# Patient Record
Sex: MALE | Race: WHITE | NOT HISPANIC OR LATINO | Employment: FULL TIME | URBAN - METROPOLITAN AREA
[De-identification: names, ages, dates, MRNs, and addresses within clinical notes are randomized per-mention and may not be internally consistent; named-entity substitution may affect disease eponyms.]

---

## 2023-03-25 DIAGNOSIS — F41.9 ANXIETY DISORDER, UNSPECIFIED TYPE: Primary | ICD-10-CM

## 2023-03-27 PROBLEM — Z98.1 S/P LUMBAR SPINAL FUSION: Status: ACTIVE | Noted: 2023-03-27

## 2023-03-27 PROBLEM — I10 HYPERTENSION, ESSENTIAL: Status: ACTIVE | Noted: 2023-03-27

## 2023-03-27 PROBLEM — G47.30 SLEEP APNEA: Status: ACTIVE | Noted: 2023-03-27

## 2023-03-27 PROBLEM — L25.9 CONTACT DERMATITIS: Status: ACTIVE | Noted: 2023-03-27

## 2023-03-27 PROBLEM — M54.12 CERVICAL RADICULITIS: Status: ACTIVE | Noted: 2023-03-27

## 2023-03-27 PROBLEM — F41.9 ANXIETY DISORDER: Status: ACTIVE | Noted: 2023-03-27

## 2023-03-27 PROBLEM — N52.9 ERECTILE DYSFUNCTION: Status: ACTIVE | Noted: 2023-03-27

## 2023-03-27 PROBLEM — M48.02 IDIOPATHIC CERVICAL SPINAL STENOSIS: Status: ACTIVE | Noted: 2023-03-27

## 2023-03-27 PROBLEM — D17.79 EPIDURAL LIPOMATOSIS: Status: ACTIVE | Noted: 2023-03-27

## 2023-03-27 PROBLEM — M48.061 LUMBAR FORAMINAL STENOSIS: Status: ACTIVE | Noted: 2023-03-27

## 2023-03-27 PROBLEM — E88.2 EPIDURAL LIPOMATOSIS: Status: ACTIVE | Noted: 2023-03-27

## 2023-03-27 PROBLEM — M19.049 CMC ARTHRITIS: Status: ACTIVE | Noted: 2023-03-27

## 2023-03-27 RX ORDER — BUPROPION HYDROCHLORIDE 150 MG/1
TABLET, EXTENDED RELEASE ORAL
Qty: 90 TABLET | Refills: 0 | Status: SHIPPED | OUTPATIENT
Start: 2023-03-27 | End: 2023-06-16

## 2023-03-28 DIAGNOSIS — N52.9 ERECTILE DYSFUNCTION, UNSPECIFIED ERECTILE DYSFUNCTION TYPE: Primary | ICD-10-CM

## 2023-03-28 RX ORDER — SILDENAFIL 100 MG/1
TABLET, FILM COATED ORAL
Qty: 30 TABLET | Refills: 0 | Status: SHIPPED | OUTPATIENT
Start: 2023-03-28

## 2023-05-18 ENCOUNTER — OFFICE VISIT (OUTPATIENT)
Dept: PRIMARY CARE | Facility: CLINIC | Age: 58
End: 2023-05-18
Payer: COMMERCIAL

## 2023-05-18 VITALS
RESPIRATION RATE: 16 BRPM | OXYGEN SATURATION: 92 % | TEMPERATURE: 98.7 F | HEART RATE: 77 BPM | WEIGHT: 212.5 LBS | DIASTOLIC BLOOD PRESSURE: 90 MMHG | SYSTOLIC BLOOD PRESSURE: 139 MMHG | BODY MASS INDEX: 29.64 KG/M2

## 2023-05-18 DIAGNOSIS — M48.061 LUMBAR FORAMINAL STENOSIS: ICD-10-CM

## 2023-05-18 DIAGNOSIS — Z98.1 S/P LUMBAR SPINAL FUSION: ICD-10-CM

## 2023-05-18 DIAGNOSIS — M54.50 CHRONIC LEFT-SIDED LOW BACK PAIN WITHOUT SCIATICA: Primary | ICD-10-CM

## 2023-05-18 DIAGNOSIS — G89.29 CHRONIC LEFT-SIDED LOW BACK PAIN WITHOUT SCIATICA: Primary | ICD-10-CM

## 2023-05-18 PROCEDURE — 3080F DIAST BP >= 90 MM HG: CPT

## 2023-05-18 PROCEDURE — 99214 OFFICE O/P EST MOD 30 MIN: CPT

## 2023-05-18 PROCEDURE — 20552 NJX 1/MLT TRIGGER POINT 1/2: CPT

## 2023-05-18 PROCEDURE — 3075F SYST BP GE 130 - 139MM HG: CPT

## 2023-05-18 PROCEDURE — 1036F TOBACCO NON-USER: CPT

## 2023-05-18 RX ORDER — FAMCICLOVIR 250 MG/1
250 TABLET ORAL 2 TIMES DAILY
COMMUNITY
Start: 2016-08-26 | End: 2024-01-30

## 2023-05-18 RX ORDER — MULTIVITAMIN
1 TABLET ORAL DAILY
COMMUNITY

## 2023-05-18 RX ORDER — GUAIFENESIN 1200 MG/1
1200 TABLET, EXTENDED RELEASE ORAL
COMMUNITY
Start: 2016-09-19 | End: 2023-08-30 | Stop reason: ALTCHOICE

## 2023-05-18 RX ORDER — LOSARTAN POTASSIUM 100 MG/1
100 TABLET ORAL DAILY
COMMUNITY
End: 2023-07-18 | Stop reason: SDUPTHER

## 2023-05-18 RX ORDER — NAPROXEN 500 MG/1
500 TABLET ORAL 2 TIMES DAILY PRN
Qty: 60 TABLET | Refills: 0 | Status: SHIPPED | OUTPATIENT
Start: 2023-05-18 | End: 2023-06-15 | Stop reason: SDUPTHER

## 2023-05-18 RX ORDER — MINERAL OIL
1 ENEMA (ML) RECTAL DAILY
COMMUNITY

## 2023-05-18 RX ORDER — PROPRANOLOL/HYDROCHLOROTHIAZID 40 MG-25MG
TABLET ORAL
COMMUNITY

## 2023-05-18 RX ORDER — FLECAINIDE ACETATE 100 MG/1
100 TABLET ORAL 2 TIMES DAILY
COMMUNITY
Start: 2016-08-26

## 2023-05-18 RX ORDER — HYDROCHLOROTHIAZIDE 25 MG/1
25 TABLET ORAL DAILY
COMMUNITY
End: 2024-02-28

## 2023-05-18 RX ORDER — NAPROXEN 500 MG/1
1 TABLET, DELAYED RELEASE ORAL EVERY 8 HOURS PRN
COMMUNITY
Start: 2022-06-22 | End: 2023-05-18 | Stop reason: ALTCHOICE

## 2023-05-18 RX ORDER — OMEPRAZOLE 20 MG/1
20 CAPSULE, DELAYED RELEASE ORAL
COMMUNITY
Start: 2022-08-12 | End: 2023-08-30 | Stop reason: ALTCHOICE

## 2023-05-18 RX ORDER — GABAPENTIN 300 MG/1
300 CAPSULE ORAL 3 TIMES DAILY
COMMUNITY
End: 2023-08-30 | Stop reason: ALTCHOICE

## 2023-05-18 RX ORDER — HYDROCODONE BITARTRATE AND ACETAMINOPHEN 5; 325 MG/1; MG/1
1 TABLET ORAL EVERY 6 HOURS PRN
COMMUNITY
End: 2023-08-30 | Stop reason: ALTCHOICE

## 2023-05-18 RX ORDER — METOPROLOL TARTRATE 25 MG/1
25 TABLET, FILM COATED ORAL 2 TIMES DAILY
COMMUNITY
Start: 2016-08-26 | End: 2024-01-17 | Stop reason: ALTCHOICE

## 2023-05-18 RX ORDER — AMOXICILLIN 250 MG
2 CAPSULE ORAL 2 TIMES DAILY
COMMUNITY
Start: 2022-10-18 | End: 2023-08-30 | Stop reason: ALTCHOICE

## 2023-05-18 RX ORDER — FLAXSEED OIL 1000 MG
1 CAPSULE ORAL 2 TIMES DAILY
COMMUNITY

## 2023-05-18 ASSESSMENT — ENCOUNTER SYMPTOMS
NAUSEA: 0
DIFFICULTY URINATING: 0
FEVER: 0
LIGHT-HEADEDNESS: 0
SHORTNESS OF BREATH: 0
DIZZINESS: 0
MYALGIAS: 1
DYSURIA: 0
OCCASIONAL FEELINGS OF UNSTEADINESS: 0
DEPRESSION: 0
DIARRHEA: 0
CONSTIPATION: 0
LOSS OF SENSATION IN FEET: 0
BACK PAIN: 1
BLOOD IN STOOL: 0
ABDOMINAL PAIN: 0
VOMITING: 0
WEAKNESS: 1

## 2023-05-18 NOTE — PROGRESS NOTES
Subjective   Kvng June is a 57 y.o. male who is here for acute complaint of low back pain.  Patient has history of lumbar stenosis status post spinal fusion in October of last year.  Surgery he was pain-free for several months, however 3 months ago he started having pain in his low back that was a 2 out of 10.  It has now progressed to 8-9 out of 10 is made worse with walking distances more than 100 feet and with standing.  Laying down seems to relieve the pain.  Endorses radiation into the left hip and points to a tender spot on the left lumbar musculature.  Over the past several days he has been having the sensation that his left leg falls asleep and becomes weak after walking and requires rest to feel better.  Denies loss of bowel or bladder control and has not had sciatic type pain either.    He has been taking between 12-15 200 mg ibuprofens daily for several weeks which has provided some pain relief.  Denies abdominal pain, bloody or melanic stools and no history of ulcers.  He has also tried taking cyclobenzaprine and gabapentin which she was previously prescribed from pain management, this does not seem to have helped very much.    Comprehensive Medical/Surgical/Social/Family History  Past Medical History:   Diagnosis Date    Personal history of other diseases of the circulatory system 03/30/2022    History of hypertension     Past Surgical History:   Procedure Laterality Date    OTHER SURGICAL HISTORY  03/30/2022    Appendectomy    US GUIDED NEEDLE LIVER BIOPSY  5/18/2020    US GUIDED NEEDLE LIVER BIOPSY 5/18/2020 [facility]     Social History     Social History Narrative    Not on file         Allergies and Medications  Amlodipine and Venlafaxine  Current Outpatient Medications on File Prior to Visit   Medication Sig Dispense Refill    buPROPion SR (Wellbutrin SR) 150 mg 12 hr tablet TAKE ONE TABLET BY MOUTH DAILY 90 tablet 0    buPROPion XL (Wellbutrin XL) 300 mg 24 hr tablet TAKE ONE  TABLET BY MOUTH DAILY 90 tablet 1    famciclovir (Famvir) 250 mg tablet Take 1 tablet (250 mg) by mouth twice a day.      fexofenadine (Allegra) 180 mg tablet Take 1 tablet (180 mg) by mouth once daily.      flaxseed oiL 1,000 mg capsule Take 1 capsule (1,000 mg) by mouth 2 times a day.      flecainide (Tambocor) 100 mg tablet Take 1 tablet (100 mg) by mouth twice a day.      gabapentin (Neurontin) 300 mg capsule Take 1 capsule (300 mg) by mouth 3 times a day.      guaiFENesin (Mucinex) 1,200 mg tablet extended release 12hr Take 1 tablet (1,200 mg) by mouth.      hydroCHLOROthiazide (HYDRODiuril) 25 mg tablet Take 1 tablet (25 mg) by mouth once daily.      HYDROcodone-acetaminophen (Norco) 5-325 mg tablet Take 1 tablet by mouth every 6 hours if needed for severe pain (7 - 10).      L. acidophilus/Bifid. animalis 15.5 billion cell capsule Take by mouth.      losartan (Cozaar) 100 mg tablet Take 1 tablet (100 mg) by mouth once daily.      metoprolol tartrate (Lopressor) 25 mg tablet Take 1 tablet (25 mg) by mouth 2 times a day.      multivitamin tablet Take 1 tablet by mouth once daily.      omeprazole (PriLOSEC) 20 mg DR capsule Take 1 capsule (20 mg) by mouth once daily in the morning. Take before meals.      sennosides-docusate sodium (Lakeshia-Colace) 8.6-50 mg tablet Take 2 tablets by mouth twice a day.      sildenafil (Viagra) 100 mg tablet TAKE 1 TABLET BY MOUTH DAILY 1 HOUR BEFORE NEEDED 30 tablet 0    turmeric-turmeric root extract 450-50 mg capsule Take by mouth.      [DISCONTINUED] EC-Naprosyn 500 mg EC tablet Take 1 tablet (500 mg) by mouth every 8 hours if needed.       No current facility-administered medications on file prior to visit.       Review of Systems   Constitutional:  Negative for fever.   Respiratory:  Negative for shortness of breath.    Cardiovascular:  Negative for chest pain.   Gastrointestinal:  Negative for abdominal pain, blood in stool, constipation, diarrhea, nausea and vomiting.    Genitourinary:  Negative for decreased urine volume, difficulty urinating, dysuria and urgency.   Musculoskeletal:  Positive for back pain (See HPI) and myalgias.   Neurological:  Positive for weakness (See HPI). Negative for dizziness, syncope and light-headedness.   All other systems reviewed and are negative.      Objective   /90 (BP Location: Right arm, Patient Position: Sitting)   Pulse 77   Temp 37.1 °C (98.7 °F) (Temporal)   Resp 16   Wt 96.4 kg (212 lb 8 oz)   SpO2 92%   BMI 29.64 kg/m²     Physical Exam  Vitals reviewed.   Constitutional:       General: He is not in acute distress.     Appearance: Normal appearance. He is not toxic-appearing.   HENT:      Head: Normocephalic and atraumatic.      Nose: Nose normal.   Eyes:      Extraocular Movements: Extraocular movements intact.   Cardiovascular:      Rate and Rhythm: Normal rate and regular rhythm.      Heart sounds: No murmur heard.     No friction rub. No gallop.   Pulmonary:      Effort: Pulmonary effort is normal. No respiratory distress.      Breath sounds: Normal breath sounds. No wheezing, rhonchi or rales.   Musculoskeletal:      Lumbar back: Tenderness (Bar surgical scar appreciated on examination tenderness and muscle belly of left lumbar musculature) present. No swelling, edema or deformity. Decreased range of motion.        Back:       Comments: Surgical scar appreciated on examination   Skin:     General: Skin is warm and dry.   Neurological:      General: No focal deficit present.      Mental Status: He is alert.   Psychiatric:         Mood and Affect: Mood normal.         Behavior: Behavior normal.     Patient ID: Kvng June is a 57 y.o. male.    Orthopedic Injury Treatment    Date/Time: 5/18/2023 11:31 PM    Performed by: Rico Spencer DO  Authorized by: Rico Spencer DO    Consent:     Consent obtained:  Verbal    Consent given by:  Patient    Risks discussed:  Pain and vascular damage    Alternatives discussed:   Delayed treatment, alternative treatment, observation and referral  Injury:     Injury location:  Spine    Spine injury location:  Lumbar or lower  Pre-procedure details:     Distal neurologic exam:  Normal    Range of motion: reduced    Sedation:     Sedation type:  None  Anesthesia:     Anesthesia method:  None  Post-procedure details:     Distal neurologic exam:  Unchanged    Distal perfusion: unchanged      Procedure completion:  Tolerated well, no immediate complications      Assessment/Plan   Problem List Items Addressed This Visit       S/P lumbar spinal fusion    Relevant Medications    naproxen (Naprosyn) 500 mg tablet    Other Relevant Orders    Referral to Physical Therapy    Referral to Pain Medicine    Inject trigger point, 1 or 2    Lumbar foraminal stenosis    Relevant Medications    naproxen (Naprosyn) 500 mg tablet    Other Relevant Orders    Referral to Physical Therapy    Referral to Pain Medicine    Inject trigger point, 1 or 2    Chronic left-sided low back pain without sciatica - Primary     History of lumbar foraminal stenosis status post spinal fusion surgery in October 2022.  Pain has been somewhat controlled with high doses of ibuprofen.  Prescription for Naprosyn 500 mg twice daily provided, please take this instead of ibuprofen.  Trigger point injection administered in office today.  Referral to pain management and physical therapy provided.    Reviewed alarm symptoms with patient and when to seek immediate medical attention.         Relevant Medications    naproxen (Naprosyn) 500 mg tablet    Other Relevant Orders    Referral to Physical Therapy    Referral to Pain Medicine    Inject trigger point, 1 or 2

## 2023-05-18 NOTE — ASSESSMENT & PLAN NOTE
History of lumbar foraminal stenosis status post spinal fusion surgery in October 2022.  Pain has been somewhat controlled with high doses of ibuprofen.  Prescription for Naprosyn 500 mg twice daily provided, please take this instead of ibuprofen.  Trigger point injection administered in office today.  Referral to pain management and physical therapy provided.    Reviewed alarm symptoms with patient and when to seek immediate medical attention.

## 2023-05-19 NOTE — PROGRESS NOTES
I saw and evaluated the patient. I personally obtained the key and critical portions of the history and physical exam or was physically present for key and critical portions performed by the resident/fellow. I reviewed the resident/fellow's documentation and discussed the patient with the resident/fellow. I agree with the resident/fellow's medical decision making as documented in the note.    Rico Spencer, DO

## 2023-06-13 ENCOUNTER — TELEPHONE (OUTPATIENT)
Dept: PRIMARY CARE | Facility: CLINIC | Age: 58
End: 2023-06-13
Payer: COMMERCIAL

## 2023-06-13 DIAGNOSIS — G89.29 CHRONIC LEFT-SIDED LOW BACK PAIN WITHOUT SCIATICA: ICD-10-CM

## 2023-06-13 DIAGNOSIS — Z98.1 S/P LUMBAR SPINAL FUSION: ICD-10-CM

## 2023-06-13 DIAGNOSIS — M48.061 LUMBAR FORAMINAL STENOSIS: ICD-10-CM

## 2023-06-13 DIAGNOSIS — M54.50 CHRONIC LEFT-SIDED LOW BACK PAIN WITHOUT SCIATICA: ICD-10-CM

## 2023-06-15 DIAGNOSIS — F41.9 ANXIETY DISORDER, UNSPECIFIED TYPE: ICD-10-CM

## 2023-06-15 RX ORDER — NAPROXEN 500 MG/1
500 TABLET ORAL 2 TIMES DAILY PRN
Qty: 60 TABLET | Refills: 0 | Status: SHIPPED | OUTPATIENT
Start: 2023-06-15 | End: 2023-07-15

## 2023-06-15 NOTE — TELEPHONE ENCOUNTER
Called patient to discuss low back pain. He has seen pain management and PT. Pain management thinking that the pain is related to his hip rather than his back and has recommended joint injection.     Refill request for naproxen approved, reviewed precautions and told to avoid other NSAID use while taking naproxen

## 2023-06-15 NOTE — TELEPHONE ENCOUNTER
Rx Refill Request Telephone Encounter    Name:  Kvng Sepulvedapayaldidi  :  [id number]  Medication Name:  Naproxen  Dose : 500 mg  Route : PO  Frequency : BID PRN  Quantity : 1 tablet  Specific Pharmacy location:  [id number] [address] [facility]   Date of last appointment:  2023

## 2023-06-16 RX ORDER — BUPROPION HYDROCHLORIDE 150 MG/1
TABLET, EXTENDED RELEASE ORAL
Qty: 90 TABLET | Refills: 0 | Status: SHIPPED | OUTPATIENT
Start: 2023-06-16 | End: 2023-08-30 | Stop reason: ALTCHOICE

## 2023-06-21 ENCOUNTER — TELEPHONE (OUTPATIENT)
Dept: PRIMARY CARE | Facility: CLINIC | Age: 58
End: 2023-06-21
Payer: COMMERCIAL

## 2023-07-18 DIAGNOSIS — I10 HYPERTENSION, ESSENTIAL: Primary | ICD-10-CM

## 2023-07-18 RX ORDER — LOSARTAN POTASSIUM 100 MG/1
100 TABLET ORAL DAILY
Qty: 90 TABLET | Refills: 3 | Status: SHIPPED | OUTPATIENT
Start: 2023-07-18 | End: 2024-07-17

## 2023-08-30 ENCOUNTER — OFFICE VISIT (OUTPATIENT)
Dept: PRIMARY CARE | Facility: CLINIC | Age: 58
End: 2023-08-30
Payer: COMMERCIAL

## 2023-08-30 VITALS
BODY MASS INDEX: 29.49 KG/M2 | WEIGHT: 206 LBS | OXYGEN SATURATION: 98 % | TEMPERATURE: 98.1 F | RESPIRATION RATE: 18 BRPM | HEART RATE: 69 BPM | HEIGHT: 70 IN | DIASTOLIC BLOOD PRESSURE: 88 MMHG | SYSTOLIC BLOOD PRESSURE: 140 MMHG

## 2023-08-30 DIAGNOSIS — F41.1 GENERALIZED ANXIETY DISORDER: ICD-10-CM

## 2023-08-30 DIAGNOSIS — I10 HYPERTENSION, ESSENTIAL: Primary | ICD-10-CM

## 2023-08-30 DIAGNOSIS — Z98.890 STATUS POST ABLATION OF ATRIAL FIBRILLATION: ICD-10-CM

## 2023-08-30 DIAGNOSIS — Z86.79 STATUS POST ABLATION OF ATRIAL FIBRILLATION: ICD-10-CM

## 2023-08-30 PROBLEM — I48.91 ATRIAL FIBRILLATION (MULTI): Status: ACTIVE | Noted: 2023-08-30

## 2023-08-30 PROCEDURE — 3077F SYST BP >= 140 MM HG: CPT

## 2023-08-30 PROCEDURE — 99213 OFFICE O/P EST LOW 20 MIN: CPT

## 2023-08-30 PROCEDURE — 1036F TOBACCO NON-USER: CPT

## 2023-08-30 PROCEDURE — 3079F DIAST BP 80-89 MM HG: CPT

## 2023-08-30 RX ORDER — BUPROPION HYDROCHLORIDE 150 MG/1
150 TABLET ORAL EVERY MORNING
Qty: 30 TABLET | Refills: 3 | Status: SHIPPED | OUTPATIENT
Start: 2023-08-30 | End: 2024-01-02 | Stop reason: SDUPTHER

## 2023-08-30 RX ORDER — FEXOFENADINE HCL AND PSEUDOEPHEDRINE HCI 180; 240 MG/1; MG/1
TABLET, EXTENDED RELEASE ORAL
COMMUNITY
End: 2024-01-17 | Stop reason: ALTCHOICE

## 2023-08-30 ASSESSMENT — ENCOUNTER SYMPTOMS
LIGHT-HEADEDNESS: 0
NERVOUS/ANXIOUS: 0
HYPERACTIVE: 0
BACK PAIN: 1
SHORTNESS OF BREATH: 0
HALLUCINATIONS: 0
DIZZINESS: 0
CHILLS: 0
VOMITING: 0
NAUSEA: 0
DYSPHORIC MOOD: 0
FEVER: 0

## 2023-08-30 NOTE — PROGRESS NOTES
"Subjective   Kvng June is a 58 y.o. male who is here to discuss refill on wellbutrin, which he has been taking for years.  He has been on a dose of 450 mg Wellbutrin daily and is currently out of his 150 mg tablets.  He does feel like Wellbutrin greatly helps improve his mood especially when he is dealing with his chronic back pain.  Of note patient does admit to sometimes having intrusive thoughts of \"thinking that he would be better off dead\".  These thoughts occur more frequently when his pain is uncontrolled or he is out of his Wellbutrin.  He insists that he has never had desire, intent, or plan to harm himself or others.     He also has swelling at the tip of his right index finger. He noticed this last week. Finger is slightly tender when he presses on it, but it is not painful. Full range of motion. No other joint swelling elsewhere.     Comprehensive Medical/Surgical/Social/Family History  Past Medical History:   Diagnosis Date    Personal history of other diseases of the circulatory system 03/30/2022    History of hypertension     Past Surgical History:   Procedure Laterality Date    OTHER SURGICAL HISTORY  03/30/2022    Appendectomy    US GUIDED NEEDLE LIVER BIOPSY  5/18/2020    US GUIDED NEEDLE LIVER BIOPSY 5/18/2020 [facility]     Social History     Social History Narrative    Not on file         Allergies and Medications  Amlodipine and Venlafaxine  Current Outpatient Medications on File Prior to Visit   Medication Sig Dispense Refill    famciclovir (Famvir) 250 mg tablet Take 1 tablet (250 mg) by mouth twice a day.      fexofenadine (Allegra) 180 mg tablet Take 1 tablet (180 mg) by mouth once daily.      fexofenadine-pseudoephedrine (Allegra-D 24 Hour) 180-240 mg 24 hr tablet Take by mouth.      flaxseed oiL 1,000 mg capsule Take 1 capsule (1,000 mg) by mouth 2 times a day.      flecainide (Tambocor) 100 mg tablet Take 1 tablet (100 mg) by mouth twice a day.      hydroCHLOROthiazide " (HYDRODiuril) 25 mg tablet Take 1 tablet (25 mg) by mouth once daily.      losartan (Cozaar) 100 mg tablet Take 1 tablet (100 mg) by mouth once daily. 90 tablet 3    metoprolol tartrate (Lopressor) 25 mg tablet Take 1 tablet (25 mg) by mouth 2 times a day.      multivitamin tablet Take 1 tablet by mouth once daily.      sildenafil (Viagra) 100 mg tablet TAKE 1 TABLET BY MOUTH DAILY 1 HOUR BEFORE NEEDED 30 tablet 0    turmeric-turmeric root extract 450-50 mg capsule Take by mouth.      [DISCONTINUED] buPROPion SR (Wellbutrin SR) 150 mg 12 hr tablet TAKE ONE TABLET BY MOUTH DAILY 90 tablet 0    buPROPion XL (Wellbutrin XL) 300 mg 24 hr tablet TAKE ONE TABLET BY MOUTH DAILY 90 tablet 1    [DISCONTINUED] gabapentin (Neurontin) 300 mg capsule Take 1 capsule (300 mg) by mouth 3 times a day.      [DISCONTINUED] guaiFENesin (Mucinex) 1,200 mg tablet extended release 12hr Take 1 tablet (1,200 mg) by mouth.      [DISCONTINUED] HYDROcodone-acetaminophen (Norco) 5-325 mg tablet Take 1 tablet by mouth every 6 hours if needed for severe pain (7 - 10).      [DISCONTINUED] L. acidophilus/Bifid. animalis 15.5 billion cell capsule Take by mouth.      [DISCONTINUED] omeprazole (PriLOSEC) 20 mg DR capsule Take 1 capsule (20 mg) by mouth once daily in the morning. Take before meals.      [DISCONTINUED] sennosides-docusate sodium (Lakeshia-Colace) 8.6-50 mg tablet Take 2 tablets by mouth twice a day.       Current Facility-Administered Medications on File Prior to Visit   Medication Dose Route Frequency Provider Last Rate Last Admin    triamcinolone acetonide (Kenalog-80) injection 80 mg  80 mg intramuscular Once Stanley A Thurston, DO             Review of Systems   Constitutional:  Negative for chills and fever.   Respiratory:  Negative for shortness of breath.    Cardiovascular:  Negative for chest pain.   Gastrointestinal:  Negative for nausea and vomiting.   Musculoskeletal:  Positive for back pain (history of chronic back pain).  "  Neurological:  Negative for dizziness and light-headedness.   Psychiatric/Behavioral:  Positive for suicidal ideas (see HPI). Negative for dysphoric mood and hallucinations. The patient is not nervous/anxious and is not hyperactive.    All other systems reviewed and are negative.      Objective   /88 (BP Location: Right arm, Patient Position: Sitting)   Pulse 69   Temp 36.7 °C (98.1 °F)   Resp 18   Ht 1.778 m (5' 10\")   Wt 93.4 kg (206 lb)   SpO2 98%   BMI 29.56 kg/m²     Physical Exam  Vitals reviewed.   Constitutional:       General: He is not in acute distress.     Appearance: Normal appearance. He is not toxic-appearing.   HENT:      Head: Normocephalic and atraumatic.      Nose: Nose normal.   Eyes:      Extraocular Movements: Extraocular movements intact.   Cardiovascular:      Rate and Rhythm: Normal rate and regular rhythm.      Heart sounds: No murmur heard.     No friction rub. No gallop.   Pulmonary:      Effort: Pulmonary effort is normal. No respiratory distress.      Breath sounds: Normal breath sounds. No wheezing, rhonchi or rales.   Musculoskeletal:      Right hand: Swelling present.      Comments: Swelling of right DIP 2nd finger. No erythema, tenderness, warmth, fluctuance.    Skin:     General: Skin is warm and dry.   Neurological:      General: No focal deficit present.      Mental Status: He is alert.   Psychiatric:         Mood and Affect: Mood normal.         Behavior: Behavior normal.         Assessment/Plan 58 year old male presenting for refills on his wellbutrin 150 mg. He has been taking 450 mg total of wellbutrin (combo of 150 mg and 300 mg) for years now. He reports doing well and it improves his mood greatly. Recently without the 150 mg doses he has had thoughts of wishing that he was dead.  He admits that this is something that only happens when he is out of his medication or his back pain is severely uncontrolled.  He adamantly denies any intent, plan, or desire to " actually harm himself or others.  Discussed with patient that if this is something that continues to happen he needs to immediately come back into the office or go to the emergency department.    Additionally patient has extensive cardiac history including A-fib with ablation, hypertension, etc.  He would like to see a cardiologist within the  system so referral was provided.    In regards to the swelling that he has noticed on the DIP of his 2nd right finger, it appears to be benign swelling related to arthritis.  However if he starts having joint swelling in other parts of his body or this becomes hot, red, painful, febrile he will return to the office.  Problem List Items Addressed This Visit       Anxiety disorder    Relevant Medications    buPROPion XL (Wellbutrin XL) 150 mg 24 hr tablet    Hypertension, essential - Primary    Relevant Orders    Referral to Cardiology    Status post ablation of atrial fibrillation    Relevant Orders    Referral to Cardiology

## 2023-08-31 NOTE — PROGRESS NOTES
I reviewed with the resident the medical history and the resident’s findings on physical examination.  I discussed with the resident the patient’s diagnosis and concur with the treatment plan as documented in the resident note.     Rico Spencer, DO

## 2023-10-12 ENCOUNTER — OFFICE VISIT (OUTPATIENT)
Dept: ORTHOPEDIC SURGERY | Facility: CLINIC | Age: 58
End: 2023-10-12
Payer: COMMERCIAL

## 2023-10-12 ENCOUNTER — ANCILLARY PROCEDURE (OUTPATIENT)
Dept: RADIOLOGY | Facility: CLINIC | Age: 58
End: 2023-10-12
Payer: COMMERCIAL

## 2023-10-12 DIAGNOSIS — M70.61 GREATER TROCHANTERIC BURSITIS OF RIGHT HIP: ICD-10-CM

## 2023-10-12 DIAGNOSIS — M25.551 PAIN OF RIGHT HIP: ICD-10-CM

## 2023-10-12 DIAGNOSIS — S70.00XA CONTUSION OF HIP AND THIGH, INITIAL ENCOUNTER: ICD-10-CM

## 2023-10-12 DIAGNOSIS — S76.019A HIP STRAIN, INITIAL ENCOUNTER: ICD-10-CM

## 2023-10-12 DIAGNOSIS — S70.10XA CONTUSION OF HIP AND THIGH, INITIAL ENCOUNTER: ICD-10-CM

## 2023-10-12 PROCEDURE — 73502 X-RAY EXAM HIP UNI 2-3 VIEWS: CPT | Mod: RT,FY

## 2023-10-12 PROCEDURE — 1036F TOBACCO NON-USER: CPT | Performed by: FAMILY MEDICINE

## 2023-10-12 PROCEDURE — 99203 OFFICE O/P NEW LOW 30 MIN: CPT | Performed by: FAMILY MEDICINE

## 2023-10-12 PROCEDURE — 73502 X-RAY EXAM HIP UNI 2-3 VIEWS: CPT | Mod: RIGHT SIDE | Performed by: FAMILY MEDICINE

## 2023-10-12 PROCEDURE — 99213 OFFICE O/P EST LOW 20 MIN: CPT | Performed by: FAMILY MEDICINE

## 2023-10-12 NOTE — PROGRESS NOTES
Acute Injury New Patient Visit    CC:   Chief Complaint   Patient presents with    Right Hip - Post-op, Pain     Ongoing worsening issue  X rays  today       HPI: Kvng is a 58 y.o.male who presents today with new complaints of acute right hip pain from twisting injury.  He denies any numbness tingling or burning.  States resultant now worsening left-sided lateral hip pain as he has been favoring his right hip.  He is status post lumbar spine fusion for prior chronic low back pain.  He denies any deep inguinal type pain everything is laterally.  He stated that he came here today because of the worsening bruising to the lateral side of the hip.  He has some mild difficulty with activities but is able to largely weight-bear and ambulate without much of a problem.        Review of Systems   GENERAL: Negative for malaise, significant weight loss, fever  MUSCULOSKELETAL: See HPI  NEURO: Negative for numbness / tingling     Past Medical History  Past Medical History:   Diagnosis Date    Personal history of other diseases of the circulatory system 2022    History of hypertension       Medication review  Medication Documentation Review Audit       Reviewed by Rico Spencer DO (Physician) on 23 at 1440      Medication Order Taking? Sig Documenting Provider Last Dose Status   Discontinued 23 1425   buPROPion XL (Wellbutrin XL) 300 mg 24 hr tablet [id number]  TAKE ONE TABLET BY MOUTH DAILY Da Dominguez MD   23 2359   famciclovir (Famvir) 250 mg tablet [id number] Yes Take 1 tablet (250 mg) by mouth twice a day. Historical Provider, MD Taking Active   fexofenadine (Allegra) 180 mg tablet [id number] Yes Take 1 tablet (180 mg) by mouth once daily. Historical Provider, MD Taking Active   fexofenadine-pseudoephedrine (Allegra-D 24 Hour) 180-240 mg 24 hr tablet [id number] Yes Take by mouth. Historical Provider, MD Taking Active   flaxseed oiL 1,000 mg capsule [id number] Yes Take 1 capsule (1,000 mg) by  "mouth 2 times a day. Historical Provider, MD Taking Active   flecainide (Tambocor) 100 mg tablet [id number] Yes Take 1 tablet (100 mg) by mouth twice a day. Historical Provider, MD Taking Active     Discontinued 08/30/23 1356     Discontinued 08/30/23 1356   hydroCHLOROthiazide (HYDRODiuril) 25 mg tablet [id number] Yes Take 1 tablet (25 mg) by mouth once daily. Historical Provider, MD Taking Active     Discontinued 08/30/23 1356     Discontinued 08/30/23 1356   losartan (Cozaar) 100 mg tablet [id number] Yes Take 1 tablet (100 mg) by mouth once daily. Rico Spencer DO Taking Active   metoprolol tartrate (Lopressor) 25 mg tablet [id number] Yes Take 1 tablet (25 mg) by mouth 2 times a day. Historical Provider, MD Taking Active   multivitamin tablet [id number] Yes Take 1 tablet by mouth once daily. Historical Provider, MD Taking Active     Discontinued 08/30/23 1356     Discontinued 08/30/23 1356   sildenafil (Viagra) 100 mg tablet [id number] Yes TAKE 1 TABLET BY MOUTH DAILY 1 HOUR BEFORE NEEDED Rico Spencer DO Taking Active   triamcinolone acetonide (Kenalog-80) injection 80 mg [id number]   Stanley A Thurston, DO  Active   turmeric-turmeric root extract 450-50 mg capsule [id number] Yes Take by mouth. Historical Provider, MD Taking Active                    Allergies  Allergies   Allergen Reactions    Amlodipine Other     stomach cramps    Venlafaxine Other     Induces Irregular heart beat. \"Makes me mean\"    Induces Irregular heart beat       Social History  Social History     Socioeconomic History    Marital status:      Spouse name: Not on file    Number of children: Not on file    Years of education: Not on file    Highest education level: Not on file   Occupational History    Not on file   Tobacco Use    Smoking status: Former     Types: Cigarettes    Smokeless tobacco: Never   Substance and Sexual Activity    Alcohol use: Yes    Drug use: Never    Sexual activity: Not on file   Other Topics Concern    Not on file   Social " History Narrative    Not on file     Social Determinants of Health     Financial Resource Strain: Not on file   Food Insecurity: Not on file   Transportation Needs: Not on file   Physical Activity: Not on file   Stress: Not on file   Social Connections: Not on file   Intimate Partner Violence: Not on file   Housing Stability: Not on file       Surgical History  Past Surgical History:   Procedure Laterality Date    OTHER SURGICAL HISTORY  03/30/2022    Appendectomy    US GUIDED NEEDLE LIVER BIOPSY  5/18/2020    US GUIDED NEEDLE LIVER BIOPSY 5/18/2020 [facility]       Physical Exam:  GENERAL:  Patient is awake, alert, and oriented to person place and time.  Patient appears well nourished and well kept.  Affect Calm, Not Acutely Distressed.  HEENT:  Normocephalic, Atraumatic, EOMI  CARDIOVASCULAR:  Hemodynamically stable.  RESPIRATORY:  Normal respirations with unlabored breathing.  NEURO: Gross sensation intact to the lower extremities bilaterally.  Extremity: Right hip exam: On inspection obvious soft tissue swelling and bruising in intermittent stages of healing all throughout the lateral side of the hip.  Mild tenderness palpation at the greater trochanter region and bursa.  He has a mild tenderness palpation at the iliac crest.  No ASIS or AIIS pain.  He has a negative logroll with very minimal symptoms he has an equivocal SILVINO and FADIR test all with lateral sided pain full intact extensor mechanism 5 out of 5 strength with hip flexion abduction and adduction is otherwise normal and largely unremarkable.  Contralateral limb was examined and noted to be full range of motion and strength.      Diagnostics: See dictated report from today        Procedure: None  Procedures    Assessment:   Problem List Items Addressed This Visit    None  Visit Diagnoses       Pain of right hip        Relevant Orders    XR hip right 2 or 3 views             Plan: Discussed with the patient concern for may be a small avulsion  injury with a soft tissue or bony.  We will not call any fracture here today.  We will offer him a short course of an oral steroid in addition to continuing with any anti-inflammatory down the road.  We will hold off on any muscle relaxer.  We will offer him a short course of physical therapy.  We will see him back in 4 to 6 weeks for repeat evaluation with any worsening or persistent pain we will consider MRI at that time.  We will hold off on any injection treatment.  Orders Placed This Encounter    XR hip right 2 or 3 views      At the conclusion of the visit there were no further questions by the patient/family regarding their plan of care.  Patient was instructed to call or return with any issues, questions, or concerns regarding their injury and/or treatment plan described above.     10/12/23 at 2:00 PM - Cole C Budinsky, MD    Office: [phone]    This note was prepared using voice recognition software.  The details of this note are correct and have been reviewed, and corrected to the best of my ability.  Some grammatical errors may persist related to the Dragon software.

## 2023-10-13 RX ORDER — METHYLPREDNISOLONE 4 MG/1
TABLET ORAL
Qty: 1 EACH | Refills: 0 | Status: SHIPPED | OUTPATIENT
Start: 2023-10-13 | End: 2024-01-17 | Stop reason: WASHOUT

## 2023-10-16 ENCOUNTER — HOSPITAL ENCOUNTER (OUTPATIENT)
Dept: RADIOLOGY | Facility: HOSPITAL | Age: 58
Discharge: HOME | End: 2023-10-16
Payer: COMMERCIAL

## 2023-10-16 DIAGNOSIS — I10 ESSENTIAL (PRIMARY) HYPERTENSION: ICD-10-CM

## 2023-10-16 PROCEDURE — 75571 CT HRT W/O DYE W/CA TEST: CPT

## 2023-10-24 PROBLEM — M67.40 GANGLION: Status: ACTIVE | Noted: 2023-10-24

## 2023-10-24 PROBLEM — M18.12 ARTHRITIS OF CARPOMETACARPAL (CMC) JOINT OF LEFT THUMB: Status: ACTIVE | Noted: 2023-10-24

## 2023-10-24 PROBLEM — M43.16 SPONDYLOLISTHESIS OF LUMBAR REGION: Status: ACTIVE | Noted: 2023-10-24

## 2023-10-24 PROBLEM — M18.12 ARTHRITIS OF CARPOMETACARPAL (CMC) JOINT OF LEFT THUMB: Status: RESOLVED | Noted: 2023-10-24 | Resolved: 2023-10-24

## 2023-10-24 PROBLEM — R04.0 EPISTAXIS: Status: ACTIVE | Noted: 2023-10-24

## 2023-10-24 PROBLEM — B00.9 HERPES SIMPLEX: Status: ACTIVE | Noted: 2023-10-24

## 2023-10-24 PROBLEM — L03.012 PARONYCHIA OF FINGER OF LEFT HAND: Status: ACTIVE | Noted: 2023-10-24

## 2023-10-24 PROBLEM — M54.16 LUMBAR RADICULOPATHY: Status: ACTIVE | Noted: 2023-10-24

## 2023-10-24 PROBLEM — M53.3 SI (SACROILIAC) JOINT DYSFUNCTION: Status: ACTIVE | Noted: 2023-10-24

## 2023-10-24 PROBLEM — R73.9 HYPERGLYCEMIA: Status: ACTIVE | Noted: 2023-10-24

## 2023-10-24 PROBLEM — E66.3 OVERWEIGHT: Status: ACTIVE | Noted: 2023-10-24

## 2023-10-24 PROBLEM — R26.2 DIFFICULTY WALKING: Status: ACTIVE | Noted: 2023-10-24

## 2023-10-24 PROBLEM — M48.062 SPINAL STENOSIS OF LUMBAR REGION WITH NEUROGENIC CLAUDICATION: Status: ACTIVE | Noted: 2023-10-24

## 2023-10-24 PROBLEM — R00.2 PALPITATIONS: Status: ACTIVE | Noted: 2023-10-24

## 2023-10-24 PROBLEM — L74.513 HYPERHIDROSIS OF FEET: Status: ACTIVE | Noted: 2023-10-24

## 2023-10-24 PROBLEM — R19.7 BLOODY DIARRHEA: Status: ACTIVE | Noted: 2023-10-24

## 2023-10-25 ENCOUNTER — APPOINTMENT (OUTPATIENT)
Dept: CARDIOLOGY | Facility: CLINIC | Age: 58
End: 2023-10-25
Payer: COMMERCIAL

## 2023-11-01 DIAGNOSIS — F41.9 ANXIETY DISORDER, UNSPECIFIED TYPE: ICD-10-CM

## 2023-11-01 RX ORDER — BUPROPION HYDROCHLORIDE 300 MG/1
300 TABLET ORAL DAILY
Qty: 90 TABLET | Refills: 0 | Status: SHIPPED | OUTPATIENT
Start: 2023-11-01 | End: 2024-02-21

## 2023-11-17 ENCOUNTER — APPOINTMENT (OUTPATIENT)
Dept: ORTHOPEDIC SURGERY | Facility: CLINIC | Age: 58
End: 2023-11-17
Payer: COMMERCIAL

## 2023-11-17 ENCOUNTER — OFFICE VISIT (OUTPATIENT)
Dept: PRIMARY CARE | Facility: CLINIC | Age: 58
End: 2023-11-17
Payer: COMMERCIAL

## 2023-11-17 ENCOUNTER — LAB (OUTPATIENT)
Dept: LAB | Facility: LAB | Age: 58
End: 2023-11-17
Payer: COMMERCIAL

## 2023-11-17 VITALS
HEIGHT: 70 IN | RESPIRATION RATE: 16 BRPM | HEART RATE: 74 BPM | DIASTOLIC BLOOD PRESSURE: 90 MMHG | WEIGHT: 214 LBS | SYSTOLIC BLOOD PRESSURE: 150 MMHG | TEMPERATURE: 97.7 F | BODY MASS INDEX: 30.64 KG/M2

## 2023-11-17 DIAGNOSIS — R10.9 FLANK PAIN: ICD-10-CM

## 2023-11-17 DIAGNOSIS — S39.012A STRAIN OF LUMBAR REGION, INITIAL ENCOUNTER: Primary | ICD-10-CM

## 2023-11-17 LAB
ALBUMIN SERPL BCP-MCNC: 4.5 G/DL (ref 3.4–5)
ANION GAP SERPL CALC-SCNC: 12 MMOL/L (ref 10–20)
APPEARANCE UR: CLEAR
BILIRUB UR STRIP.AUTO-MCNC: NEGATIVE MG/DL
BUN SERPL-MCNC: 20 MG/DL (ref 6–23)
CALCIUM SERPL-MCNC: 9.5 MG/DL (ref 8.6–10.3)
CHLORIDE SERPL-SCNC: 100 MMOL/L (ref 98–107)
CO2 SERPL-SCNC: 31 MMOL/L (ref 21–32)
COLOR UR: YELLOW
CREAT SERPL-MCNC: 1.11 MG/DL (ref 0.5–1.3)
ERYTHROCYTE [DISTWIDTH] IN BLOOD BY AUTOMATED COUNT: 12.8 % (ref 11.5–14.5)
GFR SERPL CREATININE-BSD FRML MDRD: 76 ML/MIN/1.73M*2
GLUCOSE SERPL-MCNC: 106 MG/DL (ref 74–99)
GLUCOSE UR STRIP.AUTO-MCNC: NEGATIVE MG/DL
HCT VFR BLD AUTO: 43.8 % (ref 41–52)
HGB BLD-MCNC: 14.2 G/DL (ref 13.5–17.5)
KETONES UR STRIP.AUTO-MCNC: NEGATIVE MG/DL
LEUKOCYTE ESTERASE UR QL STRIP.AUTO: NEGATIVE
MCH RBC QN AUTO: 35.6 PG (ref 26–34)
MCHC RBC AUTO-ENTMCNC: 32.4 G/DL (ref 32–36)
MCV RBC AUTO: 110 FL (ref 80–100)
NITRITE UR QL STRIP.AUTO: NEGATIVE
NRBC BLD-RTO: 0 /100 WBCS (ref 0–0)
PH UR STRIP.AUTO: 7 [PH]
PHOSPHATE SERPL-MCNC: 3.2 MG/DL (ref 2.5–4.9)
PLATELET # BLD AUTO: 159 X10*3/UL (ref 150–450)
POTASSIUM SERPL-SCNC: 4.5 MMOL/L (ref 3.5–5.3)
PROT UR STRIP.AUTO-MCNC: NEGATIVE MG/DL
RBC # BLD AUTO: 3.99 X10*6/UL (ref 4.5–5.9)
RBC # UR STRIP.AUTO: NEGATIVE /UL
SODIUM SERPL-SCNC: 138 MMOL/L (ref 136–145)
SP GR UR STRIP.AUTO: 1.02
UROBILINOGEN UR STRIP.AUTO-MCNC: <2 MG/DL
WBC # BLD AUTO: 5.4 X10*3/UL (ref 4.4–11.3)

## 2023-11-17 PROCEDURE — 81003 URINALYSIS AUTO W/O SCOPE: CPT

## 2023-11-17 PROCEDURE — 36415 COLL VENOUS BLD VENIPUNCTURE: CPT

## 2023-11-17 PROCEDURE — 1036F TOBACCO NON-USER: CPT

## 2023-11-17 PROCEDURE — 80069 RENAL FUNCTION PANEL: CPT

## 2023-11-17 PROCEDURE — 3080F DIAST BP >= 90 MM HG: CPT

## 2023-11-17 PROCEDURE — 85027 COMPLETE CBC AUTOMATED: CPT

## 2023-11-17 PROCEDURE — 99214 OFFICE O/P EST MOD 30 MIN: CPT

## 2023-11-17 PROCEDURE — 3077F SYST BP >= 140 MM HG: CPT

## 2023-11-17 RX ORDER — MELOXICAM 15 MG/1
15 TABLET ORAL DAILY
Qty: 15 TABLET | Refills: 0 | Status: SHIPPED | OUTPATIENT
Start: 2023-11-17 | End: 2023-12-02

## 2023-11-17 RX ORDER — LIDOCAINE 50 MG/G
1 PATCH TOPICAL DAILY
Qty: 15 PATCH | Refills: 0 | Status: CANCELLED | OUTPATIENT
Start: 2023-11-17 | End: 2024-11-16

## 2023-11-17 RX ORDER — DICLOFENAC SODIUM 10 MG/G
4 GEL TOPICAL 4 TIMES DAILY PRN
Qty: 100 G | Refills: 0 | Status: SHIPPED | OUTPATIENT
Start: 2023-11-17 | End: 2024-01-17 | Stop reason: WASHOUT

## 2023-11-17 RX ORDER — LIDOCAINE 50 MG/G
1 PATCH TOPICAL DAILY
Qty: 30 PATCH | Refills: 0 | Status: SHIPPED | OUTPATIENT
Start: 2023-11-17 | End: 2023-12-17

## 2023-11-17 ASSESSMENT — ENCOUNTER SYMPTOMS
ARTHRALGIAS: 1
HEMATURIA: 0
FLANK PAIN: 1
SHORTNESS OF BREATH: 0
VOMITING: 0
LIGHT-HEADEDNESS: 0
COLOR CHANGE: 0
COUGH: 0
MYALGIAS: 1
FEVER: 0
BACK PAIN: 1
DIFFICULTY URINATING: 0
NAUSEA: 0
CHEST TIGHTNESS: 0
DIZZINESS: 0
DYSURIA: 0

## 2023-11-17 NOTE — PROGRESS NOTES
I reviewed the resident/fellow's documentation and discussed the patient with the resident/fellow. I agree with the resident/fellow's medical decision making as documented in the note.     Manny Mcdowell MD

## 2023-11-17 NOTE — PROGRESS NOTES
"Subjective   Patient ID: Kvng June is a 58 y.o. male who presents for Hip Pain (Left, moving up back).   Past medical history includes lumbar stenosis with history of lumbar fusion and chronic bilateral hip pain.  He describes this pain as below his rib cage on the back left side of his torso.  This area is tender to touch and he describes the pain as sharp and intermittent.  It is worse with standing/walking and has even woken him up from night because of the pain.  He has been taking 220 mg naproxen approximately 3-4 times a day which has been helping him with the pain well.  Additionally reports having taken 1 leftover Norco from a hand surgery which did help.    She denies any radiation of pain, numbness, tingling, weakness, bowel or bladder incontinence, changes since urine, blood in the urine, fevers, chills, shortness of breath, pleuritic pain.    Review of Systems   Constitutional:  Negative for fever.   Respiratory:  Negative for cough, chest tightness and shortness of breath.    Cardiovascular:  Negative for chest pain.   Gastrointestinal:  Negative for nausea and vomiting.   Genitourinary:  Positive for flank pain. Negative for decreased urine volume, difficulty urinating, dysuria, hematuria and urgency.   Musculoskeletal:  Positive for arthralgias, back pain and myalgias. Negative for gait problem.   Skin:  Negative for color change.   Neurological:  Negative for dizziness and light-headedness.   All other systems reviewed and are negative.      Objective   /90   Pulse 74   Temp 36.5 °C (97.7 °F)   Resp 16   Ht 1.778 m (5' 10\")   Wt 97.1 kg (214 lb)   BMI 30.71 kg/m²     Physical Exam  Vitals reviewed.   Constitutional:       General: He is not in acute distress.     Appearance: Normal appearance. He is not toxic-appearing.   HENT:      Head: Normocephalic and atraumatic.      Nose: Nose normal.   Eyes:      Extraocular Movements: Extraocular movements intact.   Cardiovascular:      " Rate and Rhythm: Normal rate and regular rhythm.      Heart sounds: No murmur heard.     No friction rub. No gallop.   Pulmonary:      Effort: Pulmonary effort is normal. No respiratory distress.      Breath sounds: Normal breath sounds. No wheezing, rhonchi or rales.   Musculoskeletal:         General: Tenderness (Tenderness to palpation in left lumbar paraspinals below ribs.  No radicular symptoms producible, no weakness or sensation discrepancy) present.   Skin:     General: Skin is warm and dry.      Findings: No lesion or rash.   Neurological:      General: No focal deficit present.      Mental Status: He is alert and oriented to person, place, and time.   Psychiatric:         Mood and Affect: Mood normal.         Behavior: Behavior normal.         Assessment/Plan 58-year-old male presenting with left paraspinal muscular pain.  History and examination consistent with muscle strain of the paraspinal musculature on the left side.  He has no midline tenderness, radicular signs or symptoms, numbness, tingling, weakness, loss of bowel or bladder continence.  Additionally he has no shortness of breath, pleuritic pain, focal lung findings that would be suggestive of infectious lung etiology.  Given the nonspecific nature of his pain we will check RFP and urinalysis for blood in the event that his pain is being caused by a kidney stone, however he has never had kidney stone in the past.    We will treat this as musculoskeletal in nature and provided prescription for meloxicam 50 mg, diclofenac gel, and lidocaine patches.    Reviewed ED precautions with patient he acknowledged he will follow-up as needed if his pain gets worse or does not improve.  Problem List Items Addressed This Visit    None  Visit Diagnoses         Codes    Strain of lumbar region, initial encounter    -  Primary S39.012A    Relevant Medications    meloxicam (Mobic) 15 mg tablet    diclofenac sodium (Voltaren) 1 % gel gel    lidocaine (Lidoderm) 5  % patch    Flank pain     R10.9    Relevant Orders    Urinalysis with Reflex Microscopic    Renal function panel    CBC

## 2023-11-22 NOTE — RESULT ENCOUNTER NOTE
Please call Kvng June to inform him that his renal function and blood counts are acceptable and largely unchanged from previous lab work.  Additionally his urinalysis is completely normal.  All in all there is no signs of renal stones on these tests.

## 2023-12-30 DIAGNOSIS — F41.1 GENERALIZED ANXIETY DISORDER: ICD-10-CM

## 2024-01-02 RX ORDER — BUPROPION HYDROCHLORIDE 150 MG/1
150 TABLET ORAL
Qty: 30 TABLET | Refills: 0 | Status: SHIPPED | OUTPATIENT
Start: 2024-01-02 | End: 2024-01-23

## 2024-01-17 ENCOUNTER — OFFICE VISIT (OUTPATIENT)
Dept: PRIMARY CARE | Facility: CLINIC | Age: 59
End: 2024-01-17
Payer: COMMERCIAL

## 2024-01-17 VITALS
SYSTOLIC BLOOD PRESSURE: 135 MMHG | HEART RATE: 79 BPM | OXYGEN SATURATION: 95 % | HEIGHT: 70 IN | BODY MASS INDEX: 31.21 KG/M2 | WEIGHT: 218 LBS | DIASTOLIC BLOOD PRESSURE: 81 MMHG | RESPIRATION RATE: 18 BRPM | TEMPERATURE: 98.5 F

## 2024-01-17 DIAGNOSIS — K57.92 DIVERTICULITIS: Primary | ICD-10-CM

## 2024-01-17 PROCEDURE — 99213 OFFICE O/P EST LOW 20 MIN: CPT

## 2024-01-17 PROCEDURE — 1036F TOBACCO NON-USER: CPT

## 2024-01-17 PROCEDURE — 3075F SYST BP GE 130 - 139MM HG: CPT

## 2024-01-17 PROCEDURE — 3079F DIAST BP 80-89 MM HG: CPT

## 2024-01-17 RX ORDER — AMOXICILLIN AND CLAVULANATE POTASSIUM 875; 125 MG/1; MG/1
875 TABLET, FILM COATED ORAL 2 TIMES DAILY
Qty: 10 TABLET | Refills: 0 | Status: SHIPPED | OUTPATIENT
Start: 2024-01-17 | End: 2024-01-25 | Stop reason: ALTCHOICE

## 2024-01-17 ASSESSMENT — ENCOUNTER SYMPTOMS
PALPITATIONS: 0
DIFFICULTY URINATING: 0
EYE REDNESS: 0
FATIGUE: 0
ABDOMINAL PAIN: 1
ARTHRALGIAS: 0
CHILLS: 0
POLYPHAGIA: 0
APPETITE CHANGE: 0
EYE PAIN: 0
RHINORRHEA: 0
DYSURIA: 0
NUMBNESS: 0
SINUS PAIN: 0
BLOOD IN STOOL: 1
SINUS PRESSURE: 0
DIARRHEA: 0
EYE ITCHING: 0
WHEEZING: 0
ACTIVITY CHANGE: 0
FREQUENCY: 0
SHORTNESS OF BREATH: 0
SLEEP DISTURBANCE: 0
POLYDIPSIA: 0
CHOKING: 0
SORE THROAT: 0
WOUND: 0
LIGHT-HEADEDNESS: 0
EYE DISCHARGE: 0
DIZZINESS: 0
MYALGIAS: 0
STRIDOR: 0
CHEST TIGHTNESS: 0
FEVER: 0
CONSTIPATION: 1
SEIZURES: 0
HEADACHES: 0
AGITATION: 0
NAUSEA: 0
COUGH: 0
ABDOMINAL DISTENTION: 1
BACK PAIN: 0
FACIAL ASYMMETRY: 0
VOMITING: 0

## 2024-01-17 NOTE — PROGRESS NOTES
"Subjective   Patient ID: [id number] 1965   Kvng June is a 58 y.o. male who presents for Rectal Bleeding (Rectal bleeding (bright red) in toilet today with clots. He had this happen 4 years ago and was dx with diverticulitis 1/27/2020).  Patient is a 58-year-old male with history of diverticulitis presenting with episode of BRBPR this morning with bowel movement.  Patient describes a moderate amount of blood with clots seen in the toilet.  No further episodes today.  He describes mild abdominal cramping but no real abdominal pain.  He does report some constipation over the past few weeks, and states that he has taken Imodium several times this week for \"abdominal cramping.\"  Patient denies fever, dysuria, nausea, or vomiting.  He is tolerating p.o. intake normally.  Patient states that this feels very similar to his previous episode of diverticulitis, for which he was seen in the ED with CT scan revealing diverticulitis and he was treated with antibiotic and sent home.  He did have a colonoscopy in 2020 which was unremarkable with the exception of diverticulum.  He also takes naproxen daily for headaches.        Review of Systems   Constitutional:  Negative for activity change, appetite change, chills, fatigue and fever.   HENT:  Negative for congestion, dental problem, ear pain, mouth sores, postnasal drip, rhinorrhea, sinus pressure, sinus pain, sneezing and sore throat.    Eyes:  Negative for pain, discharge, redness and itching.   Respiratory:  Negative for cough, choking, chest tightness, shortness of breath, wheezing and stridor.    Cardiovascular:  Negative for chest pain, palpitations and leg swelling.   Gastrointestinal:  Positive for abdominal distention, abdominal pain, blood in stool and constipation. Negative for diarrhea, nausea and vomiting.   Endocrine: Negative for polydipsia, polyphagia and polyuria.   Genitourinary:  Negative for decreased urine volume, difficulty urinating, dysuria, " "enuresis, frequency and urgency.   Musculoskeletal:  Negative for arthralgias, back pain and myalgias.   Skin:  Negative for pallor, rash and wound.   Neurological:  Negative for dizziness, seizures, syncope, facial asymmetry, light-headedness, numbness and headaches.   Psychiatric/Behavioral:  Negative for agitation, behavioral problems, sleep disturbance and suicidal ideas.        Objective   /81 (BP Location: Right arm, Patient Position: Sitting)   Pulse 79   Temp 36.9 °C (98.5 °F)   Resp 18   Ht 1.778 m (5' 10\")   Wt 98.9 kg (218 lb)   SpO2 95%   BMI 31.28 kg/m²    Physical Exam  Vitals and nursing note reviewed.   Constitutional:       General: He is not in acute distress.     Appearance: Normal appearance. He is not toxic-appearing.   HENT:      Head: Normocephalic and atraumatic.      Right Ear: External ear normal.      Left Ear: External ear normal.      Nose: Nose normal.      Mouth/Throat:      Mouth: Mucous membranes are moist.      Pharynx: No oropharyngeal exudate or posterior oropharyngeal erythema.   Eyes:      General: No scleral icterus.     Extraocular Movements: Extraocular movements intact.      Pupils: Pupils are equal, round, and reactive to light.   Cardiovascular:      Rate and Rhythm: Normal rate and regular rhythm.      Pulses: Normal pulses.      Heart sounds: Normal heart sounds. No murmur heard.     No friction rub. No gallop.   Pulmonary:      Effort: Pulmonary effort is normal. No respiratory distress.      Breath sounds: Normal breath sounds. No stridor. No wheezing, rhonchi or rales.   Abdominal:      General: Abdomen is flat. Bowel sounds are normal. There is no distension.      Palpations: Abdomen is soft. There is no mass.      Tenderness: There is abdominal tenderness in the right lower quadrant and left lower quadrant. There is no right CVA tenderness, left CVA tenderness, guarding or rebound.      Hernia: No hernia is present.       Musculoskeletal:         " General: No swelling, deformity or signs of injury. Normal range of motion.      Cervical back: Normal range of motion and neck supple. No rigidity.      Right lower leg: No edema.      Left lower leg: No edema.   Lymphadenopathy:      Cervical: No cervical adenopathy.   Skin:     General: Skin is warm and dry.      Capillary Refill: Capillary refill takes less than 2 seconds.      Findings: No rash.   Neurological:      General: No focal deficit present.      Mental Status: He is alert and oriented to person, place, and time. Mental status is at baseline.      Cranial Nerves: No cranial nerve deficit.   Psychiatric:         Mood and Affect: Mood normal.         Behavior: Behavior normal.         Assessment/Plan   Problem List Items Addressed This Visit    None  Visit Diagnoses       Diverticulitis    -  Primary    Relevant Medications    amoxicillin-pot clavulanate (Augmentin) 875-125 mg tablet              Presentation consistent with mild diverticulitis in the setting of known diverticulosis.  Patient is afebrile, with normal vital signs, no rebound or guarding on exam.  He is appropriate for outpatient treatment.  Will send Augmentin to the pharmacy.  Discussed with patient the need for clear liquid diet and bowel rest x 3 days.  If not improving by Friday, will plan to order laboratory evaluation and CT to check for complications.  ED precautions discussed.     Discussed with attending physician Dr. Spencer.     Keyur Bowden DO

## 2024-01-17 NOTE — PATIENT INSTRUCTIONS
Kvng -  We will send an antibiotic to your pharmacy.     Take a clear liquid diet for the next 3 days.     If you are not feeling better on Friday, please give us a call.

## 2024-01-23 DIAGNOSIS — F41.1 GENERALIZED ANXIETY DISORDER: ICD-10-CM

## 2024-01-23 RX ORDER — BUPROPION HYDROCHLORIDE 150 MG/1
TABLET ORAL
Qty: 30 TABLET | Refills: 0 | Status: SHIPPED | OUTPATIENT
Start: 2024-01-23 | End: 2024-02-28 | Stop reason: SDUPTHER

## 2024-01-25 ENCOUNTER — APPOINTMENT (OUTPATIENT)
Dept: RADIOLOGY | Facility: HOSPITAL | Age: 59
End: 2024-01-25
Payer: COMMERCIAL

## 2024-01-25 ENCOUNTER — HOSPITAL ENCOUNTER (EMERGENCY)
Facility: HOSPITAL | Age: 59
Discharge: HOME | End: 2024-01-25
Attending: STUDENT IN AN ORGANIZED HEALTH CARE EDUCATION/TRAINING PROGRAM
Payer: COMMERCIAL

## 2024-01-25 ENCOUNTER — OFFICE VISIT (OUTPATIENT)
Dept: PRIMARY CARE | Facility: CLINIC | Age: 59
End: 2024-01-25
Payer: COMMERCIAL

## 2024-01-25 VITALS
DIASTOLIC BLOOD PRESSURE: 90 MMHG | BODY MASS INDEX: 31.57 KG/M2 | RESPIRATION RATE: 18 BRPM | WEIGHT: 220 LBS | TEMPERATURE: 98.2 F | OXYGEN SATURATION: 92 % | SYSTOLIC BLOOD PRESSURE: 130 MMHG | HEART RATE: 85 BPM

## 2024-01-25 VITALS
TEMPERATURE: 98.6 F | RESPIRATION RATE: 18 BRPM | HEART RATE: 80 BPM | HEIGHT: 70 IN | BODY MASS INDEX: 30.06 KG/M2 | WEIGHT: 210 LBS | SYSTOLIC BLOOD PRESSURE: 145 MMHG | DIASTOLIC BLOOD PRESSURE: 92 MMHG | OXYGEN SATURATION: 97 %

## 2024-01-25 DIAGNOSIS — R10.9 ABDOMINAL PAIN, UNSPECIFIED ABDOMINAL LOCATION: Primary | ICD-10-CM

## 2024-01-25 DIAGNOSIS — R10.32 LEFT LOWER QUADRANT ABDOMINAL PAIN: Primary | ICD-10-CM

## 2024-01-25 LAB
ALBUMIN SERPL BCP-MCNC: 4.5 G/DL (ref 3.4–5)
ALP SERPL-CCNC: 58 U/L (ref 33–120)
ALT SERPL W P-5'-P-CCNC: 22 U/L (ref 10–52)
ANION GAP SERPL CALC-SCNC: 16 MMOL/L (ref 10–20)
APPEARANCE UR: CLEAR
AST SERPL W P-5'-P-CCNC: 23 U/L (ref 9–39)
BASOPHILS # BLD AUTO: 0.02 X10*3/UL (ref 0–0.1)
BASOPHILS NFR BLD AUTO: 0.3 %
BILIRUB SERPL-MCNC: 0.4 MG/DL (ref 0–1.2)
BILIRUB UR STRIP.AUTO-MCNC: NEGATIVE MG/DL
BUN SERPL-MCNC: 19 MG/DL (ref 6–23)
CALCIUM SERPL-MCNC: 9.6 MG/DL (ref 8.6–10.3)
CHLORIDE SERPL-SCNC: 97 MMOL/L (ref 98–107)
CO2 SERPL-SCNC: 24 MMOL/L (ref 21–32)
COLOR UR: YELLOW
CREAT SERPL-MCNC: 1.09 MG/DL (ref 0.5–1.3)
EGFRCR SERPLBLD CKD-EPI 2021: 78 ML/MIN/1.73M*2
EOSINOPHIL # BLD AUTO: 0.11 X10*3/UL (ref 0–0.7)
EOSINOPHIL NFR BLD AUTO: 1.5 %
ERYTHROCYTE [DISTWIDTH] IN BLOOD BY AUTOMATED COUNT: 12 % (ref 11.5–14.5)
GLUCOSE SERPL-MCNC: 180 MG/DL (ref 74–99)
GLUCOSE UR STRIP.AUTO-MCNC: NEGATIVE MG/DL
HCT VFR BLD AUTO: 42 % (ref 41–52)
HGB BLD-MCNC: 14.5 G/DL (ref 13.5–17.5)
IMM GRANULOCYTES # BLD AUTO: 0.03 X10*3/UL (ref 0–0.7)
IMM GRANULOCYTES NFR BLD AUTO: 0.4 % (ref 0–0.9)
KETONES UR STRIP.AUTO-MCNC: NEGATIVE MG/DL
LACTATE BLDV-SCNC: 1.9 MMOL/L (ref 0.4–2)
LACTATE BLDV-SCNC: 2.5 MMOL/L (ref 0.4–2)
LACTATE SERPL-SCNC: 2.4 MMOL/L (ref 0.4–2)
LEUKOCYTE ESTERASE UR QL STRIP.AUTO: NEGATIVE
LIPASE SERPL-CCNC: 18 U/L (ref 9–82)
LYMPHOCYTES # BLD AUTO: 2.88 X10*3/UL (ref 1.2–4.8)
LYMPHOCYTES NFR BLD AUTO: 38.8 %
MCH RBC QN AUTO: 35.4 PG (ref 26–34)
MCHC RBC AUTO-ENTMCNC: 34.5 G/DL (ref 32–36)
MCV RBC AUTO: 102 FL (ref 80–100)
MONOCYTES # BLD AUTO: 1.04 X10*3/UL (ref 0.1–1)
MONOCYTES NFR BLD AUTO: 14 %
NEUTROPHILS # BLD AUTO: 3.34 X10*3/UL (ref 1.2–7.7)
NEUTROPHILS NFR BLD AUTO: 45 %
NITRITE UR QL STRIP.AUTO: NEGATIVE
NRBC BLD-RTO: 0 /100 WBCS (ref 0–0)
PH UR STRIP.AUTO: 6 [PH]
PLATELET # BLD AUTO: 150 X10*3/UL (ref 150–450)
POTASSIUM SERPL-SCNC: 3.5 MMOL/L (ref 3.5–5.3)
PROT SERPL-MCNC: 7.1 G/DL (ref 6.4–8.2)
PROT UR STRIP.AUTO-MCNC: NEGATIVE MG/DL
RBC # BLD AUTO: 4.1 X10*6/UL (ref 4.5–5.9)
RBC # UR STRIP.AUTO: NEGATIVE /UL
SODIUM SERPL-SCNC: 133 MMOL/L (ref 136–145)
SP GR UR STRIP.AUTO: 1.01
UROBILINOGEN UR STRIP.AUTO-MCNC: <2 MG/DL
WBC # BLD AUTO: 7.4 X10*3/UL (ref 4.4–11.3)

## 2024-01-25 PROCEDURE — 96360 HYDRATION IV INFUSION INIT: CPT | Mod: 59

## 2024-01-25 PROCEDURE — 80053 COMPREHEN METABOLIC PANEL: CPT | Performed by: STUDENT IN AN ORGANIZED HEALTH CARE EDUCATION/TRAINING PROGRAM

## 2024-01-25 PROCEDURE — 85025 COMPLETE CBC W/AUTO DIFF WBC: CPT | Performed by: STUDENT IN AN ORGANIZED HEALTH CARE EDUCATION/TRAINING PROGRAM

## 2024-01-25 PROCEDURE — 99284 EMERGENCY DEPT VISIT MOD MDM: CPT | Mod: 25,27

## 2024-01-25 PROCEDURE — 2500000004 HC RX 250 GENERAL PHARMACY W/ HCPCS (ALT 636 FOR OP/ED)

## 2024-01-25 PROCEDURE — 3080F DIAST BP >= 90 MM HG: CPT

## 2024-01-25 PROCEDURE — 3075F SYST BP GE 130 - 139MM HG: CPT

## 2024-01-25 PROCEDURE — 1036F TOBACCO NON-USER: CPT

## 2024-01-25 PROCEDURE — 36415 COLL VENOUS BLD VENIPUNCTURE: CPT

## 2024-01-25 PROCEDURE — 74177 CT ABD & PELVIS W/CONTRAST: CPT

## 2024-01-25 PROCEDURE — 2550000001 HC RX 255 CONTRASTS

## 2024-01-25 PROCEDURE — 99285 EMERGENCY DEPT VISIT HI MDM: CPT

## 2024-01-25 PROCEDURE — 81003 URINALYSIS AUTO W/O SCOPE: CPT | Performed by: STUDENT IN AN ORGANIZED HEALTH CARE EDUCATION/TRAINING PROGRAM

## 2024-01-25 PROCEDURE — 83690 ASSAY OF LIPASE: CPT | Performed by: STUDENT IN AN ORGANIZED HEALTH CARE EDUCATION/TRAINING PROGRAM

## 2024-01-25 PROCEDURE — 99214 OFFICE O/P EST MOD 30 MIN: CPT

## 2024-01-25 PROCEDURE — 36415 COLL VENOUS BLD VENIPUNCTURE: CPT | Performed by: STUDENT IN AN ORGANIZED HEALTH CARE EDUCATION/TRAINING PROGRAM

## 2024-01-25 PROCEDURE — 83605 ASSAY OF LACTIC ACID: CPT | Performed by: STUDENT IN AN ORGANIZED HEALTH CARE EDUCATION/TRAINING PROGRAM

## 2024-01-25 PROCEDURE — 74177 CT ABD & PELVIS W/CONTRAST: CPT | Performed by: RADIOLOGY

## 2024-01-25 PROCEDURE — 99284 EMERGENCY DEPT VISIT MOD MDM: CPT | Mod: 27 | Performed by: STUDENT IN AN ORGANIZED HEALTH CARE EDUCATION/TRAINING PROGRAM

## 2024-01-25 PROCEDURE — 2550000001 HC RX 255 CONTRASTS: Performed by: STUDENT IN AN ORGANIZED HEALTH CARE EDUCATION/TRAINING PROGRAM

## 2024-01-25 PROCEDURE — 83605 ASSAY OF LACTIC ACID: CPT

## 2024-01-25 RX ORDER — ONDANSETRON HYDROCHLORIDE 2 MG/ML
4 INJECTION, SOLUTION INTRAVENOUS ONCE
Status: COMPLETED | OUTPATIENT
Start: 2024-01-25 | End: 2024-01-25

## 2024-01-25 RX ORDER — ONDANSETRON 4 MG/1
4 TABLET, FILM COATED ORAL EVERY 8 HOURS PRN
Qty: 30 TABLET | Refills: 0 | Status: SHIPPED | OUTPATIENT
Start: 2024-01-25 | End: 2024-01-31 | Stop reason: SDUPTHER

## 2024-01-25 RX ORDER — ACETAMINOPHEN 325 MG/1
650 TABLET ORAL ONCE
Status: COMPLETED | OUTPATIENT
Start: 2024-01-25 | End: 2024-01-25

## 2024-01-25 RX ORDER — DICYCLOMINE HYDROCHLORIDE 10 MG/1
10 CAPSULE ORAL 3 TIMES DAILY PRN
Qty: 30 CAPSULE | Refills: 0 | Status: SHIPPED | OUTPATIENT
Start: 2024-01-25 | End: 2024-01-30 | Stop reason: SDUPTHER

## 2024-01-25 RX ADMIN — IOHEXOL 75 ML: 350 INJECTION, SOLUTION INTRAVENOUS at 17:58

## 2024-01-25 RX ADMIN — SODIUM CHLORIDE 1000 ML: 9 INJECTION, SOLUTION INTRAVENOUS at 19:40

## 2024-01-25 RX ADMIN — ACETAMINOPHEN 650 MG: 325 TABLET ORAL at 18:09

## 2024-01-25 RX ADMIN — ONDANSETRON 4 MG: 2 INJECTION INTRAMUSCULAR; INTRAVENOUS at 18:10

## 2024-01-25 RX ADMIN — SODIUM CHLORIDE, POTASSIUM CHLORIDE, SODIUM LACTATE AND CALCIUM CHLORIDE 1000 ML: 600; 310; 30; 20 INJECTION, SOLUTION INTRAVENOUS at 18:09

## 2024-01-25 ASSESSMENT — ENCOUNTER SYMPTOMS
DIZZINESS: 0
CHOKING: 0
HEADACHES: 0
EYE PAIN: 0
ACTIVITY CHANGE: 0
FACIAL ASYMMETRY: 0
COUGH: 0
CHILLS: 1
MYALGIAS: 0
LIGHT-HEADEDNESS: 0
EYE DISCHARGE: 0
NUMBNESS: 0
APPETITE CHANGE: 0
VOMITING: 0
POLYDIPSIA: 0
SINUS PRESSURE: 0
DIFFICULTY URINATING: 0
ARTHRALGIAS: 0
FEVER: 0
ABDOMINAL DISTENTION: 0
RHINORRHEA: 0
WHEEZING: 0
DIARRHEA: 1
POLYPHAGIA: 0
SORE THROAT: 0
NAUSEA: 1
FATIGUE: 0
SHORTNESS OF BREATH: 0
STRIDOR: 0
EYE REDNESS: 0
PALPITATIONS: 0
CHEST TIGHTNESS: 0
SINUS PAIN: 0
SLEEP DISTURBANCE: 0
ABDOMINAL PAIN: 1
CONSTIPATION: 0
BACK PAIN: 0
FREQUENCY: 0
DYSURIA: 0
AGITATION: 0
EYE ITCHING: 0
SEIZURES: 0
WOUND: 0

## 2024-01-25 ASSESSMENT — LIFESTYLE VARIABLES
HAVE YOU EVER FELT YOU SHOULD CUT DOWN ON YOUR DRINKING: NO
HAVE PEOPLE ANNOYED YOU BY CRITICIZING YOUR DRINKING: NO
EVER HAD A DRINK FIRST THING IN THE MORNING TO STEADY YOUR NERVES TO GET RID OF A HANGOVER: NO
REASON UNABLE TO ASSESS: NO
EVER FELT BAD OR GUILTY ABOUT YOUR DRINKING: NO

## 2024-01-25 ASSESSMENT — COLUMBIA-SUICIDE SEVERITY RATING SCALE - C-SSRS
1. IN THE PAST MONTH, HAVE YOU WISHED YOU WERE DEAD OR WISHED YOU COULD GO TO SLEEP AND NOT WAKE UP?: NO
2. HAVE YOU ACTUALLY HAD ANY THOUGHTS OF KILLING YOURSELF?: NO
6. HAVE YOU EVER DONE ANYTHING, STARTED TO DO ANYTHING, OR PREPARED TO DO ANYTHING TO END YOUR LIFE?: NO

## 2024-01-25 ASSESSMENT — PAIN - FUNCTIONAL ASSESSMENT: PAIN_FUNCTIONAL_ASSESSMENT: 0-10

## 2024-01-25 ASSESSMENT — PAIN SCALES - GENERAL: PAINLEVEL_OUTOF10: 5 - MODERATE PAIN

## 2024-01-25 NOTE — LETTER
January 25, 2024     Patient: Kvng June   YOB: 1965   Date of Visit: 1/25/2024       To Whom It May Concern:    Kvng June was seen in my clinic on 1/25/2024 at 2:20 pm. Please excuse Kvng for his absence from work from 1/25-1/28 for acute illness.     If you have any questions or concerns, please don't hesitate to call.         Sincerely,         Keyur Bowden DO        CC: No Recipients

## 2024-01-25 NOTE — PROGRESS NOTES
Subjective   Patient ID: [id number] 1965   Kvng June is a 58 y.o. male who presents for Abdominal Pain (Lower abdominal pain. Has diverticulitis. It has been painful all week but it is worse today. He has chills and nausea today. Loose stools.).  Patient is here to follow-up for diverticulitis.  He was seen on 1/17 and started on Augmentin for 5-day course.  He was also recommended to a clear liquid diet for 3 days which he did with the exception of toast with the Augmentin.  Over the weekend he started to feel better and finished the Augmentin about 2 days ago, but this week has been feeling worse with worsening abdominal pain even prior to finishing the antibiotic.  He states pain last week during our appointment was 1/10 and has worsened to 7/10 today mostly located to the left lower quadrant.  He does report chills at home but denies fever.  He reports 5 episodes of loose stools, but no hematochezia.  He has not taken anything at home for pain.              Review of Systems   Constitutional:  Positive for chills. Negative for activity change, appetite change, fatigue and fever.   HENT:  Negative for congestion, dental problem, ear pain, mouth sores, postnasal drip, rhinorrhea, sinus pressure, sinus pain, sneezing and sore throat.    Eyes:  Negative for pain, discharge, redness and itching.   Respiratory:  Negative for cough, choking, chest tightness, shortness of breath, wheezing and stridor.    Cardiovascular:  Negative for chest pain, palpitations and leg swelling.   Gastrointestinal:  Positive for abdominal pain, diarrhea and nausea. Negative for abdominal distention, constipation and vomiting.   Endocrine: Negative for polydipsia, polyphagia and polyuria.   Genitourinary:  Negative for decreased urine volume, difficulty urinating, dysuria, enuresis, frequency and urgency.   Musculoskeletal:  Negative for arthralgias, back pain and myalgias.   Skin:  Negative for pallor, rash and wound.    Neurological:  Negative for dizziness, seizures, syncope, facial asymmetry, light-headedness, numbness and headaches.   Psychiatric/Behavioral:  Negative for agitation, behavioral problems, sleep disturbance and suicidal ideas.        Objective   /90 (BP Location: Right arm, Patient Position: Sitting)   Pulse 85   Temp 36.8 °C (98.2 °F)   Resp 18   Wt 99.8 kg (220 lb)   SpO2 92%   BMI 31.57 kg/m²    Physical Exam  Vitals and nursing note reviewed.   Constitutional:       General: He is not in acute distress.     Appearance: Normal appearance. He is not toxic-appearing.   HENT:      Head: Normocephalic and atraumatic.      Right Ear: External ear normal.      Left Ear: External ear normal.      Nose: Nose normal.      Mouth/Throat:      Mouth: Mucous membranes are moist.      Pharynx: No oropharyngeal exudate or posterior oropharyngeal erythema.   Eyes:      General: No scleral icterus.     Extraocular Movements: Extraocular movements intact.      Pupils: Pupils are equal, round, and reactive to light.   Cardiovascular:      Rate and Rhythm: Normal rate and regular rhythm.      Pulses: Normal pulses.      Heart sounds: Normal heart sounds. No murmur heard.     No friction rub. No gallop.   Pulmonary:      Effort: Pulmonary effort is normal. No respiratory distress.      Breath sounds: Normal breath sounds. No stridor. No wheezing, rhonchi or rales.   Abdominal:      General: Abdomen is flat. Bowel sounds are normal. There is no distension.      Palpations: Abdomen is soft. There is no mass.      Tenderness: There is abdominal tenderness in the right upper quadrant and left lower quadrant. There is guarding. There is no right CVA tenderness, left CVA tenderness or rebound.      Hernia: No hernia is present.       Musculoskeletal:         General: No swelling, deformity or signs of injury. Normal range of motion.      Cervical back: Normal range of motion and neck supple. No rigidity.      Right lower  leg: No edema.      Left lower leg: No edema.   Lymphadenopathy:      Cervical: No cervical adenopathy.   Skin:     General: Skin is warm and dry.      Capillary Refill: Capillary refill takes less than 2 seconds.      Findings: No rash.   Neurological:      General: No focal deficit present.      Mental Status: He is alert and oriented to person, place, and time. Mental status is at baseline.      Cranial Nerves: No cranial nerve deficit.      Sensory: No sensory deficit.      Motor: No weakness.      Coordination: Coordination normal.   Psychiatric:         Mood and Affect: Mood normal.         Behavior: Behavior normal.         Assessment/Plan   Problem List Items Addressed This Visit    None  Visit Diagnoses       Abdominal pain, unspecified abdominal location    -  Primary          Discussed with patient the concern for complications secondary to diverticulitis including abscess and perforation.  Given his worsening symptoms and abdominal exam, he does require stat CT and laboratory evaluation.  Shared decision-making was utilized and ultimately recommendation was made to refer him to the ED for this evaluation.  Follow-up after discharge pending workup.  Patient verbalizes agreement and agrees to plan.    Discussed with attending physician Dr. Rene.         Keyur Bowden DO

## 2024-01-25 NOTE — ED PROVIDER NOTES
HPI   Chief Complaint   Patient presents with    Abdominal Pain       This patient is a 58-year-old male with past medical history of diverticulitis, HTN, A-fib, presenting today for left lower quadrant abdominal pain.  He reports that for the past week and A half, he has been experiencing left lower quadrant abdominal pain.  On the date initiated, he had 1 bout of bloody diarrhea with a blood clot.  He notes that since then, the rectal bleeding is stopped, though he reports frequent soft stools.  Went to see his PCP and was prescribed Augmentin.  He has since finished approximately 2 days ago.  States that initially, he felt as if his abdominal pain resolved.  Over the past few days since he finished Augmentin, he has noticed increasing abdominal pain.  Reports that due to the pain, he had to call off work today.  Noting chills today but no fever. He denies any urinary symptoms.  No flank pain.  Went to see his PCP again today who sent him here.                          [place] Coma Scale Score: 15                  Patient History   Past Medical History:   Diagnosis Date    Personal history of other diseases of the circulatory system 03/30/2022    History of hypertension     Past Surgical History:   Procedure Laterality Date    OTHER SURGICAL HISTORY  03/30/2022    Appendectomy    US GUIDED NEEDLE LIVER BIOPSY  5/18/2020    US GUIDED NEEDLE LIVER BIOPSY 5/18/2020 [facility]     Family History   Problem Relation Name Age of Onset    Hypertension Mother       Social History     Tobacco Use    Smoking status: Former     Types: Cigarettes    Smokeless tobacco: Never   Substance Use Topics    Alcohol use: Yes    Drug use: Never       Physical Exam   ED Triage Vitals [01/25/24 1554]   Temperature Heart Rate Respirations BP   36.6 °C (97.9 °F) 82 18 (!) 169/91      Pulse Ox Temp Source Heart Rate Source Patient Position   99 % Temporal Monitor Sitting      BP Location FiO2 (%)     Right arm --       Physical  Exam  Vitals and nursing note reviewed.   Constitutional:       General: He is not in acute distress.     Appearance: Normal appearance. He is not ill-appearing.   HENT:      Head: Normocephalic and atraumatic.      Right Ear: External ear normal.      Left Ear: External ear normal.      Nose: Nose normal. No congestion or rhinorrhea.      Mouth/Throat:      Mouth: Mucous membranes are moist.      Pharynx: Oropharynx is clear. No oropharyngeal exudate or posterior oropharyngeal erythema.   Eyes:      Extraocular Movements: Extraocular movements intact.      Conjunctiva/sclera: Conjunctivae normal.      Pupils: Pupils are equal, round, and reactive to light.   Cardiovascular:      Rate and Rhythm: Normal rate and regular rhythm.      Heart sounds: Normal heart sounds.   Pulmonary:      Effort: No accessory muscle usage or respiratory distress.      Breath sounds: Normal breath sounds. No wheezing, rhonchi or rales.   Abdominal:      General: Abdomen is flat. Bowel sounds are normal. There is no distension.      Palpations: Abdomen is soft.      Tenderness: There is abdominal tenderness in the left lower quadrant. There is no right CVA tenderness, left CVA tenderness or guarding.   Musculoskeletal:         General: No swelling or deformity. Normal range of motion.      Cervical back: Normal range of motion and neck supple.      Right lower leg: No edema.      Left lower leg: No edema.   Skin:     General: Skin is warm and dry.      Capillary Refill: Capillary refill takes less than 2 seconds.   Neurological:      General: No focal deficit present.      Mental Status: He is alert and oriented to person, place, and time.      GCS: GCS eye subscore is 4. GCS verbal subscore is 5. GCS motor subscore is 6.      Cranial Nerves: Cranial nerves 2-12 are intact.      Sensory: No sensory deficit.      Motor: Motor function is intact. No weakness.   Psychiatric:         Mood and Affect: Mood and affect normal.         Speech:  Speech normal.         Behavior: Behavior normal. Behavior is cooperative.         ED Course & MDM   ED Course as of 01/25/24 2040   Thu Jan 25, 2024   1647 Lipase  wnl [ML]   1647 Urinalysis with Reflex Microscopic  No blood, no signs of infection [ML]   1647 Lactate(!): 2.4  Mildly elevated, will repeat after IV fluids [ML]   1647 CBC and Auto Differential(!)  No leukocytosis or acute anemia noted [ML]   1647 Comprehensive metabolic panel(!)  Mildly decreased sodium at 133 and mildly decreased chloride at 97.  No other electrolyte abnormalities, ROBERT or elevated LFTs [ML]   1853 CT abdomen pelvis w IV contrast  1.  No acute intra-abdominal abnormality.  2. Colonic diverticulosis without evidence of acute diverticulitis.  3. Thick-walled appearance of the urinary bladder, likely related to  underdistention. Clinically exclude superimposed cystitis.    No signs of infection in urine, does not clinically have cystitis [ML]   2000 POCT Lactate, Venous(!): 2.5  2nd, increasing [ML]   2040 POCT Lactate, Venous: 1.9  3rd. Now decreasing [ML]      ED Course User Index  [ML] Pretty Rahman PA-C         Diagnoses as of 01/25/24 2040   Left lower quadrant abdominal pain       Medical Decision Making  This patient is a 58-year-old male presenting today for abdominal pain.  Please see HPI for further detail of history.  Briefly, had what he believes to be diverticulitis.  Finish all of his Augmentin 2 days ago and has had increasing pain since finishing the medicine.  Orts that the left lower quadrant.  On my exam, there is tenderness in his left lower quadrant.  No CVA tenderness, no guarding or rigidity.  He is denying any urinary symptoms.  All vital signs are stable without any signs of SIRS or sepsis.  Considering history of bloody bowels (which has since resolved) as well as the continued left lower quadrant abdominal pain, elected to get abdominal labs and CT.  Prior to me seeing this patient, patient was noted with  abdominal labs resulting of within normal limits CBC and CMP, and noninfected urine.  He did have a lactic acid drawn which was mildly elevated at 2.4.  He was started on IV fluids, given medication for symptomatic control.    CT abdomen is showing no acute intra-abdominal abnormalities.  There is diverticulosis without acute diverticulitis.  There is also a thick-walled appearance of the urinary bladder that could be due to cystitis, but he has no infection noted in his urine.  Repeat lactic acid was drawn after IV fluids were given which mildly increased to 2.5.  Patient was given 1 more bolus of IV fluids (2nd bolus) and repeat lactic acid (3rd lab drawn) resulting at 1.9.  Patient feels comfortable going home.  Given strict return precautions, when to come back to the ER and when to follow-up with his PCP.  Patient is understanding of all instructions.        Procedure  Procedures     Pretty Rahman PA-C  01/25/24 2041

## 2024-01-25 NOTE — Clinical Note
Kvng Slade was seen and treated in our emergency department on 1/25/2024.  He may return to work on 01/26/2024.       If you have any questions or concerns, please don't hesitate to call.      Pretty Rahman PA-C

## 2024-01-26 NOTE — PROGRESS NOTES
I reviewed the resident/fellow's documentation and discussed the patient with the resident/fellow. I agree with the resident/fellow's medical decision making as documented in the note.     Kiersten Rene MD

## 2024-01-26 NOTE — DISCHARGE INSTRUCTIONS
Your CT scan shows no signs of diverticulitis.  I do believe that the antibiotics helped.  It could be gas pain causing the pain in your abdomen.  If you notice any worsening signs, please come back to the ER or see your PCP.  If you notice blood in your stool again, come to the ER immediately.  Can attempt to use Bentyl once every 8 hours as needed for abdominal cramping.  Also sending you  Zofran.

## 2024-01-30 ENCOUNTER — OFFICE VISIT (OUTPATIENT)
Dept: PRIMARY CARE | Facility: CLINIC | Age: 59
End: 2024-01-30
Payer: COMMERCIAL

## 2024-01-30 ENCOUNTER — HOSPITAL ENCOUNTER (OUTPATIENT)
Dept: RADIOLOGY | Facility: HOSPITAL | Age: 59
Discharge: HOME | End: 2024-01-30
Payer: COMMERCIAL

## 2024-01-30 VITALS
WEIGHT: 221.25 LBS | OXYGEN SATURATION: 93 % | SYSTOLIC BLOOD PRESSURE: 144 MMHG | BODY MASS INDEX: 31.75 KG/M2 | RESPIRATION RATE: 16 BRPM | DIASTOLIC BLOOD PRESSURE: 89 MMHG | TEMPERATURE: 98.4 F | HEART RATE: 81 BPM

## 2024-01-30 DIAGNOSIS — B00.9 HERPES SIMPLEX: Primary | ICD-10-CM

## 2024-01-30 DIAGNOSIS — R10.13 EPIGASTRIC ABDOMINAL PAIN: Primary | ICD-10-CM

## 2024-01-30 DIAGNOSIS — R10.13 EPIGASTRIC ABDOMINAL PAIN: ICD-10-CM

## 2024-01-30 DIAGNOSIS — R10.32 LEFT LOWER QUADRANT ABDOMINAL PAIN: ICD-10-CM

## 2024-01-30 PROCEDURE — 3079F DIAST BP 80-89 MM HG: CPT

## 2024-01-30 PROCEDURE — 76705 ECHO EXAM OF ABDOMEN: CPT | Performed by: RADIOLOGY

## 2024-01-30 PROCEDURE — 3077F SYST BP >= 140 MM HG: CPT

## 2024-01-30 PROCEDURE — 99214 OFFICE O/P EST MOD 30 MIN: CPT

## 2024-01-30 PROCEDURE — 76705 ECHO EXAM OF ABDOMEN: CPT

## 2024-01-30 PROCEDURE — 1036F TOBACCO NON-USER: CPT

## 2024-01-30 RX ORDER — DICYCLOMINE HYDROCHLORIDE 10 MG/1
10 CAPSULE ORAL 3 TIMES DAILY PRN
Qty: 30 CAPSULE | Refills: 0 | Status: SHIPPED | OUTPATIENT
Start: 2024-01-30 | End: 2024-02-06 | Stop reason: ALTCHOICE

## 2024-01-30 RX ORDER — PANTOPRAZOLE SODIUM 40 MG/1
40 TABLET, DELAYED RELEASE ORAL DAILY
Qty: 30 TABLET | Refills: 5 | Status: SHIPPED | OUTPATIENT
Start: 2024-01-30 | End: 2024-07-28

## 2024-01-30 RX ORDER — FAMCICLOVIR 250 MG/1
250 TABLET ORAL 2 TIMES DAILY
Qty: 180 TABLET | Refills: 0 | Status: SHIPPED | OUTPATIENT
Start: 2024-01-30

## 2024-01-30 RX ORDER — METOPROLOL TARTRATE 25 MG/1
25 TABLET, FILM COATED ORAL 2 TIMES DAILY
COMMUNITY

## 2024-01-30 ASSESSMENT — ENCOUNTER SYMPTOMS
ACTIVITY CHANGE: 0
LIGHT-HEADEDNESS: 0
VOMITING: 0
DYSURIA: 0
CHOKING: 0
FACIAL ASYMMETRY: 0
DIARRHEA: 0
CHILLS: 0
ARTHRALGIAS: 0
HEADACHES: 0
EYE ITCHING: 0
CHEST TIGHTNESS: 0
SORE THROAT: 0
EYE DISCHARGE: 0
WOUND: 0
FEVER: 0
CONSTIPATION: 0
BACK PAIN: 0
AGITATION: 0
EYE PAIN: 0
SHORTNESS OF BREATH: 0
DIFFICULTY URINATING: 0
ABDOMINAL DISTENTION: 0
SINUS PRESSURE: 0
FATIGUE: 0
DIZZINESS: 0
PALPITATIONS: 0
SINUS PAIN: 0
ABDOMINAL PAIN: 1
APPETITE CHANGE: 0
WHEEZING: 0
SEIZURES: 0
MYALGIAS: 0
POLYDIPSIA: 0
NAUSEA: 1
SLEEP DISTURBANCE: 0
STRIDOR: 0
FREQUENCY: 0
COUGH: 0
POLYPHAGIA: 0
RHINORRHEA: 0
NUMBNESS: 0
EYE REDNESS: 0

## 2024-01-30 NOTE — PROGRESS NOTES
"Subjective   Patient ID: [id number]   Kvng June is a 58 y.o. male who presents for Abdominal Pain and Nausea (Pt to F/U for ongoing abdominal pain and nausea.).  Patient continues to have diffuse abdominal pain x 3 weeks.  He did have 1 episode of hematochezia at the beginning of symptoms.  He was initially thought to have acute diverticulitis, was treated with antibiotics.  Symptoms worsened however and he was sent to the ER with concerns for complication.  CT abdomen and pelvis with IV contrast showed diverticulosis.  When workup at the ED was negative, he was prescribed Bentyl which he has been taking 3 times a day with some improvement in his symptoms.  Patient adds that he was diagnosed with \"spastic abdominal pain\" when he was in high school which he believes is similar to IBS.  He states pain is 3/10 at baseline, colicky, 7/10 at worst, sharp, and located to RUQ, epigastric region, and LLQ.  He reports some associated nausea, denies further hematochezia, melena, vomiting, fever/chills, chest pain, or dyspnea.  As noted before, he has a history of an appendectomy and had a colonoscopy in 2020 which showed diverticulosis.  Patient does state that he takes naproxen 220mg every 6 hours.  Prior to this he was taking naproxen 500 mg every 6 hours.  Patient also states that pain has sometimes been worse with eating.        Review of Systems   Constitutional:  Negative for activity change, appetite change, chills, fatigue and fever.   HENT:  Negative for congestion, dental problem, ear pain, mouth sores, postnasal drip, rhinorrhea, sinus pressure, sinus pain, sneezing and sore throat.    Eyes:  Negative for pain, discharge, redness and itching.   Respiratory:  Negative for cough, choking, chest tightness, shortness of breath, wheezing and stridor.    Cardiovascular:  Negative for chest pain, palpitations and leg swelling.   Gastrointestinal:  Positive for abdominal pain and nausea. Negative for " abdominal distention, constipation, diarrhea and vomiting.   Endocrine: Negative for polydipsia, polyphagia and polyuria.   Genitourinary:  Negative for decreased urine volume, difficulty urinating, dysuria, enuresis, frequency and urgency.   Musculoskeletal:  Negative for arthralgias, back pain and myalgias.   Skin:  Negative for pallor, rash and wound.   Neurological:  Negative for dizziness, seizures, syncope, facial asymmetry, light-headedness, numbness and headaches.   Psychiatric/Behavioral:  Negative for agitation, behavioral problems, sleep disturbance and suicidal ideas.        Objective   /89 (BP Location: Right arm, Patient Position: Sitting)   Pulse 81   Temp 36.9 °C (98.4 °F) (Temporal)   Resp 16   Wt 100 kg (221 lb 4 oz)   SpO2 93%   BMI 31.75 kg/m²    Physical Exam  Vitals and nursing note reviewed.   Constitutional:       General: He is not in acute distress.     Appearance: Normal appearance. He is not toxic-appearing.   HENT:      Head: Normocephalic and atraumatic.      Right Ear: External ear normal.      Left Ear: External ear normal.      Nose: Nose normal.      Mouth/Throat:      Mouth: Mucous membranes are moist.      Pharynx: No oropharyngeal exudate or posterior oropharyngeal erythema.   Eyes:      General: No scleral icterus.     Extraocular Movements: Extraocular movements intact.      Pupils: Pupils are equal, round, and reactive to light.   Cardiovascular:      Rate and Rhythm: Normal rate and regular rhythm.      Pulses: Normal pulses.      Heart sounds: Normal heart sounds. No murmur heard.     No friction rub. No gallop.   Pulmonary:      Effort: Pulmonary effort is normal. No respiratory distress.      Breath sounds: Normal breath sounds. No stridor. No wheezing, rhonchi or rales.   Abdominal:      General: Abdomen is flat. Bowel sounds are normal. There is no distension.      Palpations: Abdomen is soft. There is no mass.      Tenderness: There is abdominal tenderness  in the right upper quadrant, epigastric area and left lower quadrant. There is no right CVA tenderness, left CVA tenderness, guarding or rebound.      Hernia: No hernia is present.   Musculoskeletal:         General: No swelling, deformity or signs of injury. Normal range of motion.      Cervical back: Normal range of motion and neck supple. No rigidity.      Right lower leg: No edema.      Left lower leg: No edema.   Lymphadenopathy:      Cervical: No cervical adenopathy.   Skin:     General: Skin is warm and dry.      Capillary Refill: Capillary refill takes less than 2 seconds.      Findings: No rash.   Neurological:      General: No focal deficit present.      Mental Status: He is alert and oriented to person, place, and time. Mental status is at baseline.      Cranial Nerves: No cranial nerve deficit.      Sensory: No sensory deficit.      Motor: No weakness.      Coordination: Coordination normal.   Psychiatric:         Mood and Affect: Mood normal.         Behavior: Behavior normal.         Assessment/Plan   Problem List Items Addressed This Visit    None  Visit Diagnoses       Epigastric abdominal pain    -  Primary    Relevant Medications    pantoprazole (ProtoNix) 40 mg EC tablet    Other Relevant Orders    US right upper quadrant    Left lower quadrant abdominal pain        Relevant Medications    pantoprazole (ProtoNix) 40 mg EC tablet    dicyclomine (Bentyl) 10 mg capsule          DDx includes gastric ulcer, gastritis, IBS, IBD, diverticulosis, cholelithiasis, vs other.  Given significant NSAID use, significant concern for gastric ulcer/gastritis.   At this point we will continue Bentyl and start Protonix 40 mg daily.  Pt is instructed to stop NSAIDS and use tylenol for pain. Will check right upper quadrant ultrasound.  If not improving, may need referral to GI for endoscopy and repeat colonoscopy.    Discussed with attending physician Dr. Spencer.     Keyur Bowden DO

## 2024-01-30 NOTE — PATIENT INSTRUCTIONS
Kvng,     Please get abdominal ultrasound.      Start taking the antacid once daily in the morning before eating.     Continue bentyl as needed.     Please hold off on the naproxen.

## 2024-01-31 ENCOUNTER — PATIENT MESSAGE (OUTPATIENT)
Dept: PRIMARY CARE | Facility: CLINIC | Age: 59
End: 2024-01-31
Payer: COMMERCIAL

## 2024-01-31 DIAGNOSIS — R10.9 ABDOMINAL PAIN, UNSPECIFIED ABDOMINAL LOCATION: Primary | ICD-10-CM

## 2024-01-31 DIAGNOSIS — R10.32 LEFT LOWER QUADRANT ABDOMINAL PAIN: ICD-10-CM

## 2024-01-31 DIAGNOSIS — R10.84 GENERALIZED ABDOMINAL PAIN: Primary | ICD-10-CM

## 2024-01-31 DIAGNOSIS — R14.0 ABDOMINAL BLOATING: ICD-10-CM

## 2024-01-31 RX ORDER — ONDANSETRON 4 MG/1
4 TABLET, FILM COATED ORAL EVERY 8 HOURS PRN
Qty: 30 TABLET | Refills: 0 | Status: SHIPPED | OUTPATIENT
Start: 2024-01-31

## 2024-02-06 RX ORDER — HYOSCYAMINE SULFATE 0.125 MG
0.12 TABLET ORAL EVERY 6 HOURS PRN
Qty: 60 TABLET | Refills: 1 | Status: SHIPPED | OUTPATIENT
Start: 2024-02-06

## 2024-02-08 ENCOUNTER — OFFICE VISIT (OUTPATIENT)
Dept: GASTROENTEROLOGY | Facility: CLINIC | Age: 59
End: 2024-02-08
Payer: COMMERCIAL

## 2024-02-08 VITALS
DIASTOLIC BLOOD PRESSURE: 106 MMHG | HEART RATE: 68 BPM | TEMPERATURE: 97.5 F | BODY MASS INDEX: 31.71 KG/M2 | WEIGHT: 221 LBS | SYSTOLIC BLOOD PRESSURE: 156 MMHG

## 2024-02-08 DIAGNOSIS — K62.5 RECTAL BLEEDING: Primary | ICD-10-CM

## 2024-02-08 DIAGNOSIS — R10.811 RIGHT UPPER QUADRANT ABDOMINAL TENDERNESS WITHOUT REBOUND TENDERNESS: ICD-10-CM

## 2024-02-08 DIAGNOSIS — R10.9 ABDOMINAL PAIN, UNSPECIFIED ABDOMINAL LOCATION: ICD-10-CM

## 2024-02-08 DIAGNOSIS — K59.09 CHRONIC CONSTIPATION: ICD-10-CM

## 2024-02-08 DIAGNOSIS — K76.0 FATTY LIVER: ICD-10-CM

## 2024-02-08 PROCEDURE — 99214 OFFICE O/P EST MOD 30 MIN: CPT | Performed by: NURSE PRACTITIONER

## 2024-02-08 PROCEDURE — 1036F TOBACCO NON-USER: CPT | Performed by: NURSE PRACTITIONER

## 2024-02-08 PROCEDURE — 3080F DIAST BP >= 90 MM HG: CPT | Performed by: NURSE PRACTITIONER

## 2024-02-08 PROCEDURE — 99204 OFFICE O/P NEW MOD 45 MIN: CPT | Performed by: NURSE PRACTITIONER

## 2024-02-08 PROCEDURE — 3077F SYST BP >= 140 MM HG: CPT | Performed by: NURSE PRACTITIONER

## 2024-02-08 RX ORDER — WHEAT DEXTRIN 3 G/4 G
POWDER (GRAM) ORAL
Qty: 248 G | Refills: 1 | Status: SHIPPED | OUTPATIENT
Start: 2024-02-08

## 2024-02-08 ASSESSMENT — ENCOUNTER SYMPTOMS
CARDIOVASCULAR NEGATIVE: 1
CHEST TIGHTNESS: 0
PSYCHIATRIC NEGATIVE: 1
WHEEZING: 0
DIFFICULTY URINATING: 0
COUGH: 0
SHORTNESS OF BREATH: 0
ENDOCRINE NEGATIVE: 1
DIAPHORESIS: 0
RESPIRATORY NEGATIVE: 1
CHILLS: 0
MUSCULOSKELETAL NEGATIVE: 1
STRIDOR: 0
ALLERGIC/IMMUNOLOGIC NEGATIVE: 1
EYES NEGATIVE: 1
NEUROLOGICAL NEGATIVE: 1
ROS GI COMMENTS: SEE HPI
APNEA: 0
HEMATOLOGIC/LYMPHATIC NEGATIVE: 1
FATIGUE: 0
FEVER: 0

## 2024-02-08 ASSESSMENT — PAIN SCALES - GENERAL: PAINLEVEL: 4

## 2024-02-08 NOTE — PROGRESS NOTES
"Subjective   Patient ID: Kvng June is a 58 y.o. male who presents for Abdominal Pain. PMH: sleep apnea , heart ablation (atrial fibrillation), no blood thinners, HTN  Seen by PCP with rectal bleeding, lower abdominal pain similar to previous diverticulitis infection 4 years ago  He was treated with Augmentin and underwent a CT that showed diverticulosis       Today he reports he has bilateral lower quadrant pain, described as cramping, not alleviated with a BM  No diarrhea, has constipation, was gas pain  Had an episode of bright red blood with clots a few weeks ago    Was having RUQ tenderness-negative US  Denies dysphagia, odynophagia, heartburn or reflux, was using \"a lot of naproxen\" but now stopped  He denies dark tarry stools, +nausea, denies vomiting    Using a stool softener which helps the stool but not the pain    He denies urinary symptoms, he reports twice a night to urinate    Using pantoprazole     Prior colonoscopy in 2020 was unremarkable    Had biopsies of liver---showed no cirrhosis or fibrosis.    Family Hx: Denies a family hx of CRC, GI cancers, bladder  Grandfather had melanoma of colon   Quit smoking a few years ago         Review of Systems   Constitutional:  Negative for chills, diaphoresis, fatigue and fever.   HENT: Negative.     Eyes: Negative.    Respiratory: Negative.  Negative for apnea, cough, chest tightness, shortness of breath, wheezing and stridor.    Cardiovascular: Negative.    Gastrointestinal:         See HPI    Endocrine: Negative.    Genitourinary: Negative.  Negative for difficulty urinating.   Musculoskeletal: Negative.    Skin: Negative.    Allergic/Immunologic: Negative.    Neurological: Negative.    Hematological: Negative.    Psychiatric/Behavioral: Negative.         Objective   Physical Exam  Constitutional:       Appearance: Normal appearance. He is normal weight.   HENT:      Nose: Nose normal.   Eyes:      General: Lids are normal.   Cardiovascular:      Rate " and Rhythm: Normal rate and regular rhythm.      Heart sounds: Normal heart sounds.   Pulmonary:      Effort: Pulmonary effort is normal.      Breath sounds: Normal breath sounds.   Abdominal:      General: Bowel sounds are normal. There is distension.      Palpations: Abdomen is soft.      Tenderness: There is abdominal tenderness in the epigastric area and suprapubic area.   Musculoskeletal:         General: Normal range of motion.   Skin:     General: Skin is warm and dry.   Neurological:      Mental Status: He is alert and oriented to person, place, and time.   Psychiatric:         Mood and Affect: Mood normal.         Assessment/Plan   Diagnoses and all orders for this visit:  Rectal bleeding  -     Colonoscopy Screening; Average Risk Patient; Future  Abdominal pain, unspecified abdominal location    -     Colonoscopy Screening; Average Risk Patient; Future  -     EGD; Future  Right upper quadrant abdominal tenderness without rebound tenderness  -     EGD; Future  -     Alkaline Phosphatase; Future  -     Alpha-1-Antitrypsin; Future  -     Alpha-Fetoprotein; Future  -     Anti-Mitochondrial Antibody; Future  -     Anti-Smooth Muscle Antibody; Future  -     Ceruloplasmin; Future  -     Ferritin; Future  -     Hepatic Function Panel; Future  -     Hepatitis C Antibody; Future  -     Hepatitis A Antibody, Total; Future  -     Hepatitis B Core Antibody, IgM; Future  -     Hepatitis B Core Antibody, Total; Future  -     Hepatitis B Surface Antibody; Future  -     Hepatitis B Surface Antigen; Future  -     Protime-INR; Future  -     Iron and TIBC; Future  Chronic constipation  -     wheat dextrin (Benefiber Sugar Free, dextrin,) 3 gram/4 gram powder; Take 1 teaspoon daily with a full glass of water (8 ounces)  Fatty liver  -     Liver Elastography (Fibroscan); Future    58 year old male with a PMH of sleep apnea , atrial fibrillation s/p ablation and HTN who presents today for abdominal pain. Ongoing for a few months  at first thought to be 2/2 diverticulitis, he was treated with Augmentin and underwent a CT that was negative for diverticulitis however showed thickening of the bladder. He had extensive labs including a UA as well as an negative RUQ US-only showing fatty liver. He had a prior biopsy of the liver in 2020 which showed steatosis without fibrosis or cirrhosis. He also has a h/o NSAID use (multiple times.day ) and epigastric tenderness on examination. He will undergo a colonoscopy and endoscopy. He will start fiber for constipation and consider IBGard. He will continue PPI. We discussed potentially seeing urology given the increase nocturnal urination with abnormal CT.        CONSTANTINE Potter 02/08/24 10:31 AM

## 2024-02-08 NOTE — PATIENT INSTRUCTIONS
It was nice to see you    Try IBGard     Start benefiber (one teaspoon) daily with a full glass of water    Continue pantoprazole    You will be scheduled for a colonoscopy and endoscopy   Please read all of the instructions 7 days before your colonoscopy.  You will need to take ONLY clear liquids the ENTIRE day before your procedure. These include (clear fruit juices, soda, Gatorade, broth, jello and coffee/tea) Avoid red and purple drinks. No cream or milk in the coffee.  You will need to take a bowel preparation.  You will also need a .      To schedule a procedure please call [phone]           Patient Education:    Constipation refers to a change in bowel habits, but it has varied meanings. Stools may be too hard or too small, difficult to pass, or infrequent (less than three times per week). People with constipation may also notice a frequent need to strain and a sense that the bowels are not empty.    Your bowels like consistency and routine.     Behavior changes -- The bowels are most active following meals, and this is often the time when stools will pass most readily. If you ignore your body's signals to have a bowel movement, the signals become weaker and weaker over time.    By paying close attention to these signals, you may have an easier time moving your bowels. Drinking a caffeine-containing beverage in the morning may also be helpful.    Increase fiber -- Increasing fiber in your diet may reduce or eliminate constipation. The recommended amount of dietary fiber is 20 to 35 grams of fiber per day. Examples of high fiber foods include pears, spinach, apples, raspberries.     Fiber side effects -- Consuming large amounts of fiber can cause abdominal bloating or gas; this can be minimized by starting with a small amount and slowly increasing until stools become softer and more frequent.    More recently, kiwi fruit has been identified as helping with constipation. It is recommended to eat two  daily.    Whatever you decide to use, please try daily for 2 weeks to notice improvement.     If you continue to have constipation despite trying these methods, please schedule a follow-up appointment.

## 2024-02-18 DIAGNOSIS — F41.9 ANXIETY DISORDER, UNSPECIFIED TYPE: ICD-10-CM

## 2024-02-21 RX ORDER — BUPROPION HYDROCHLORIDE 300 MG/1
300 TABLET ORAL DAILY
Qty: 14 TABLET | Refills: 0 | Status: SHIPPED | OUTPATIENT
Start: 2024-02-21 | End: 2024-02-28 | Stop reason: SDUPTHER

## 2024-02-26 DIAGNOSIS — K62.5 RECTAL BLEEDING: ICD-10-CM

## 2024-02-26 DIAGNOSIS — K59.09 CHRONIC CONSTIPATION: ICD-10-CM

## 2024-02-28 ENCOUNTER — OFFICE VISIT (OUTPATIENT)
Dept: PRIMARY CARE | Facility: CLINIC | Age: 59
End: 2024-02-28
Payer: COMMERCIAL

## 2024-02-28 ENCOUNTER — LAB (OUTPATIENT)
Dept: LAB | Facility: LAB | Age: 59
End: 2024-02-28
Payer: COMMERCIAL

## 2024-02-28 VITALS
RESPIRATION RATE: 18 BRPM | SYSTOLIC BLOOD PRESSURE: 160 MMHG | OXYGEN SATURATION: 95 % | TEMPERATURE: 98 F | BODY MASS INDEX: 32.73 KG/M2 | HEIGHT: 69 IN | DIASTOLIC BLOOD PRESSURE: 100 MMHG | HEART RATE: 76 BPM | WEIGHT: 221 LBS

## 2024-02-28 DIAGNOSIS — I48.91 ATRIAL FIBRILLATION, UNSPECIFIED TYPE (MULTI): ICD-10-CM

## 2024-02-28 DIAGNOSIS — R10.811 RIGHT UPPER QUADRANT ABDOMINAL TENDERNESS WITHOUT REBOUND TENDERNESS: ICD-10-CM

## 2024-02-28 DIAGNOSIS — F41.1 GENERALIZED ANXIETY DISORDER: ICD-10-CM

## 2024-02-28 DIAGNOSIS — M54.50 CHRONIC LEFT-SIDED LOW BACK PAIN WITHOUT SCIATICA: Primary | ICD-10-CM

## 2024-02-28 DIAGNOSIS — K76.0 FATTY LIVER: ICD-10-CM

## 2024-02-28 DIAGNOSIS — G89.29 CHRONIC LEFT-SIDED LOW BACK PAIN WITHOUT SCIATICA: Primary | ICD-10-CM

## 2024-02-28 DIAGNOSIS — M48.061 LUMBAR FORAMINAL STENOSIS: ICD-10-CM

## 2024-02-28 DIAGNOSIS — I10 HYPERTENSION, ESSENTIAL: ICD-10-CM

## 2024-02-28 DIAGNOSIS — Z00.00 HEALTHCARE MAINTENANCE: ICD-10-CM

## 2024-02-28 DIAGNOSIS — F41.9 ANXIETY DISORDER, UNSPECIFIED TYPE: ICD-10-CM

## 2024-02-28 LAB
A1AT SERPL NEPH-MCNC: 138 MG/DL (ref 84–218)
AFP SERPL-MCNC: <4 NG/ML (ref 0–9)
ALBUMIN SERPL BCP-MCNC: 4.6 G/DL (ref 3.4–5)
ALP SERPL-CCNC: 61 U/L (ref 33–120)
ALT SERPL W P-5'-P-CCNC: 17 U/L (ref 10–52)
AST SERPL W P-5'-P-CCNC: 20 U/L (ref 9–39)
BILIRUB DIRECT SERPL-MCNC: 0.1 MG/DL (ref 0–0.3)
BILIRUB SERPL-MCNC: 0.7 MG/DL (ref 0–1.2)
CERULOPLASMIN SERPL-MCNC: 25.7 MG/DL (ref 20–60)
ERYTHROCYTE [DISTWIDTH] IN BLOOD BY AUTOMATED COUNT: 12.1 % (ref 11.5–14.5)
FERRITIN SERPL-MCNC: 60 NG/ML (ref 20–300)
HBV CORE AB SER QL: NONREACTIVE
HBV CORE IGM SER QL: NONREACTIVE
HBV SURFACE AB SER-ACNC: <3.1 MIU/ML
HBV SURFACE AG SERPL QL IA: NONREACTIVE
HCT VFR BLD AUTO: 39.9 % (ref 41–52)
HCV AB SER QL: NONREACTIVE
HGB BLD-MCNC: 13.5 G/DL (ref 13.5–17.5)
HIV 1+2 AB+HIV1 P24 AG SERPL QL IA: NONREACTIVE
INR PPP: 1 (ref 0.9–1.1)
IRON SATN MFR SERPL: 40 % (ref 25–45)
IRON SERPL-MCNC: 156 UG/DL (ref 35–150)
MCH RBC QN AUTO: 35.2 PG (ref 26–34)
MCHC RBC AUTO-ENTMCNC: 33.8 G/DL (ref 32–36)
MCV RBC AUTO: 104 FL (ref 80–100)
NRBC BLD-RTO: 0 /100 WBCS (ref 0–0)
PLATELET # BLD AUTO: 179 X10*3/UL (ref 150–450)
PROT SERPL-MCNC: 7 G/DL (ref 6.4–8.2)
PROTHROMBIN TIME: 10.9 SECONDS (ref 9.8–12.8)
PSA SERPL-MCNC: 4.33 NG/ML
RBC # BLD AUTO: 3.84 X10*6/UL (ref 4.5–5.9)
TIBC SERPL-MCNC: 389 UG/DL (ref 240–445)
UIBC SERPL-MCNC: 233 UG/DL (ref 110–370)
WBC # BLD AUTO: 4.9 X10*3/UL (ref 4.4–11.3)

## 2024-02-28 PROCEDURE — 1036F TOBACCO NON-USER: CPT

## 2024-02-28 PROCEDURE — 84153 ASSAY OF PSA TOTAL: CPT

## 2024-02-28 PROCEDURE — 90715 TDAP VACCINE 7 YRS/> IM: CPT

## 2024-02-28 PROCEDURE — 82105 ALPHA-FETOPROTEIN SERUM: CPT

## 2024-02-28 PROCEDURE — 82103 ALPHA-1-ANTITRYPSIN TOTAL: CPT

## 2024-02-28 PROCEDURE — 87389 HIV-1 AG W/HIV-1&-2 AB AG IA: CPT

## 2024-02-28 PROCEDURE — 99396 PREV VISIT EST AGE 40-64: CPT

## 2024-02-28 PROCEDURE — 99214 OFFICE O/P EST MOD 30 MIN: CPT

## 2024-02-28 PROCEDURE — 86704 HEP B CORE ANTIBODY TOTAL: CPT

## 2024-02-28 PROCEDURE — 85610 PROTHROMBIN TIME: CPT

## 2024-02-28 PROCEDURE — 80076 HEPATIC FUNCTION PANEL: CPT

## 2024-02-28 PROCEDURE — 3080F DIAST BP >= 90 MM HG: CPT

## 2024-02-28 PROCEDURE — 82728 ASSAY OF FERRITIN: CPT

## 2024-02-28 PROCEDURE — 86381 MITOCHONDRIAL ANTIBODY EACH: CPT

## 2024-02-28 PROCEDURE — 36415 COLL VENOUS BLD VENIPUNCTURE: CPT

## 2024-02-28 PROCEDURE — 85027 COMPLETE CBC AUTOMATED: CPT

## 2024-02-28 PROCEDURE — 83550 IRON BINDING TEST: CPT

## 2024-02-28 PROCEDURE — 86706 HEP B SURFACE ANTIBODY: CPT

## 2024-02-28 PROCEDURE — 3077F SYST BP >= 140 MM HG: CPT

## 2024-02-28 PROCEDURE — 86015 ACTIN ANTIBODY EACH: CPT

## 2024-02-28 PROCEDURE — 86708 HEPATITIS A ANTIBODY: CPT

## 2024-02-28 PROCEDURE — 80074 ACUTE HEPATITIS PANEL: CPT

## 2024-02-28 PROCEDURE — 83540 ASSAY OF IRON: CPT

## 2024-02-28 PROCEDURE — 90471 IMMUNIZATION ADMIN: CPT

## 2024-02-28 PROCEDURE — 82390 ASSAY OF CERULOPLASMIN: CPT

## 2024-02-28 RX ORDER — BUPROPION HYDROCHLORIDE 150 MG/1
TABLET ORAL
Qty: 30 TABLET | Refills: 2 | Status: SHIPPED | OUTPATIENT
Start: 2024-02-28 | End: 2024-03-01 | Stop reason: SDUPTHER

## 2024-02-28 RX ORDER — CHLORTHALIDONE 25 MG/1
25 TABLET ORAL DAILY
Qty: 30 TABLET | Refills: 1 | Status: SHIPPED | OUTPATIENT
Start: 2024-02-28 | End: 2024-03-01 | Stop reason: SDUPTHER

## 2024-02-28 RX ORDER — BUPROPION HYDROCHLORIDE 300 MG/1
300 TABLET ORAL DAILY
Qty: 30 TABLET | Refills: 2 | Status: SHIPPED | OUTPATIENT
Start: 2024-02-28 | End: 2024-03-01 | Stop reason: SDUPTHER

## 2024-02-28 SDOH — HEALTH STABILITY: PHYSICAL HEALTH: ON AVERAGE, HOW MANY DAYS PER WEEK DO YOU ENGAGE IN MODERATE TO STRENUOUS EXERCISE (LIKE A BRISK WALK)?: 0 DAYS

## 2024-02-28 SDOH — ECONOMIC STABILITY: TRANSPORTATION INSECURITY
IN THE PAST 12 MONTHS, HAS THE LACK OF TRANSPORTATION KEPT YOU FROM MEDICAL APPOINTMENTS OR FROM GETTING MEDICATIONS?: NO

## 2024-02-28 SDOH — ECONOMIC STABILITY: TRANSPORTATION INSECURITY
IN THE PAST 12 MONTHS, HAS LACK OF TRANSPORTATION KEPT YOU FROM MEETINGS, WORK, OR FROM GETTING THINGS NEEDED FOR DAILY LIVING?: NO

## 2024-02-28 SDOH — ECONOMIC STABILITY: INCOME INSECURITY: IN THE LAST 12 MONTHS, WAS THERE A TIME WHEN YOU WERE NOT ABLE TO PAY THE MORTGAGE OR RENT ON TIME?: NO

## 2024-02-28 SDOH — ECONOMIC STABILITY: FOOD INSECURITY: WITHIN THE PAST 12 MONTHS, THE FOOD YOU BOUGHT JUST DIDN'T LAST AND YOU DIDN'T HAVE MONEY TO GET MORE.: NEVER TRUE

## 2024-02-28 SDOH — ECONOMIC STABILITY: FOOD INSECURITY: WITHIN THE PAST 12 MONTHS, YOU WORRIED THAT YOUR FOOD WOULD RUN OUT BEFORE YOU GOT MONEY TO BUY MORE.: NEVER TRUE

## 2024-02-28 SDOH — ECONOMIC STABILITY: HOUSING INSECURITY
IN THE LAST 12 MONTHS, WAS THERE A TIME WHEN YOU DID NOT HAVE A STEADY PLACE TO SLEEP OR SLEPT IN A SHELTER (INCLUDING NOW)?: NO

## 2024-02-28 SDOH — HEALTH STABILITY: PHYSICAL HEALTH: ON AVERAGE, HOW MANY MINUTES DO YOU ENGAGE IN EXERCISE AT THIS LEVEL?: 0 MIN

## 2024-02-28 SDOH — ECONOMIC STABILITY: GENERAL
WHICH OF THE FOLLOWING DO YOU KNOW HOW TO USE AND HAVE ACCESS TO EVERY DAY? (CHOOSE ALL THAT APPLY): DESKTOP COMPUTER, LAPTOP COMPUTER, OR TABLET WITH BROADBAND INTERNET CONNECTION;SMARTPHONE WITH CELLULAR DATA PLAN

## 2024-02-28 ASSESSMENT — SOCIAL DETERMINANTS OF HEALTH (SDOH)
WITHIN THE LAST YEAR, HAVE YOU BEEN HUMILIATED OR EMOTIONALLY ABUSED IN OTHER WAYS BY YOUR PARTNER OR EX-PARTNER?: NO
DO YOU BELONG TO ANY CLUBS OR ORGANIZATIONS SUCH AS CHURCH GROUPS UNIONS, FRATERNAL OR ATHLETIC GROUPS, OR SCHOOL GROUPS?: NO
WITHIN THE LAST YEAR, HAVE YOU BEEN KICKED, HIT, SLAPPED, OR OTHERWISE PHYSICALLY HURT BY YOUR PARTNER OR EX-PARTNER?: NO
HOW OFTEN DO YOU ATTEND CHURCH OR RELIGIOUS SERVICES?: NEVER
WITHIN THE LAST YEAR, HAVE YOU BEEN AFRAID OF YOUR PARTNER OR EX-PARTNER?: NO
HOW OFTEN DO YOU GET TOGETHER WITH FRIENDS OR RELATIVES?: THREE TIMES A WEEK
WITHIN THE LAST YEAR, HAVE TO BEEN RAPED OR FORCED TO HAVE ANY KIND OF SEXUAL ACTIVITY BY YOUR PARTNER OR EX-PARTNER?: NO
HOW OFTEN DO YOU ATTENT MEETINGS OF THE CLUB OR ORGANIZATION YOU BELONG TO?: NEVER
IN A TYPICAL WEEK, HOW MANY TIMES DO YOU TALK ON THE PHONE WITH FAMILY, FRIENDS, OR NEIGHBORS?: MORE THAN THREE TIMES A WEEK
HOW HARD IS IT FOR YOU TO PAY FOR THE VERY BASICS LIKE FOOD, HOUSING, MEDICAL CARE, AND HEATING?: NOT HARD AT ALL

## 2024-02-28 ASSESSMENT — ENCOUNTER SYMPTOMS
NAUSEA: 0
ANAL BLEEDING: 0
DEPRESSION: 1
BLOOD IN STOOL: 0
LIGHT-HEADEDNESS: 0
DIARRHEA: 0
FEVER: 0
CHEST TIGHTNESS: 0
COUGH: 0
CONSTIPATION: 0
SHORTNESS OF BREATH: 0
OCCASIONAL FEELINGS OF UNSTEADINESS: 0
ABDOMINAL PAIN: 0
PALPITATIONS: 0
DIZZINESS: 0
VOMITING: 0
LOSS OF SENSATION IN FEET: 0
BACK PAIN: 1

## 2024-02-28 ASSESSMENT — LIFESTYLE VARIABLES
HOW MANY STANDARD DRINKS CONTAINING ALCOHOL DO YOU HAVE ON A TYPICAL DAY: 3 OR 4
AUDIT-C TOTAL SCORE: 5
HOW OFTEN DO YOU HAVE A DRINK CONTAINING ALCOHOL: 4 OR MORE TIMES A WEEK
HOW OFTEN DO YOU HAVE SIX OR MORE DRINKS ON ONE OCCASION: NEVER
SKIP TO QUESTIONS 9-10: 0

## 2024-02-28 ASSESSMENT — PATIENT HEALTH QUESTIONNAIRE - PHQ9
10. IF YOU CHECKED OFF ANY PROBLEMS, HOW DIFFICULT HAVE THESE PROBLEMS MADE IT FOR YOU TO DO YOUR WORK, TAKE CARE OF THINGS AT HOME, OR GET ALONG WITH OTHER PEOPLE: NOT DIFFICULT AT ALL
1. LITTLE INTEREST OR PLEASURE IN DOING THINGS: SEVERAL DAYS
SUM OF ALL RESPONSES TO PHQ9 QUESTIONS 1 & 2: 2
2. FEELING DOWN, DEPRESSED OR HOPELESS: SEVERAL DAYS

## 2024-02-28 NOTE — ASSESSMENT & PLAN NOTE
Refill for Wellbutrin sent into the pharmacy.  Patient is stable on this medication and has been taking it for some time without any side effects.

## 2024-02-28 NOTE — PROGRESS NOTES
"Subjective   Kvng June is a 58 y.o. male who is here for a routine preventative care exam.  He has complicated medical history including lumbar stenosis status post spinal surgeries, chronic lumbar and hip pain, hypertension, atrial fibrillation, anxiety.  He needs refills for his Wellbutrin, but otherwise needs no medication refills today.  He is planned to have an EGD and colonoscopy in the next 1 to 3 weeks due to recent episode of bloody bowel movements.  He had a CT his calcium score performed last year with a score of 0.  He is due for tetanus shot.  He has not had PSA checked in 3 years.  He is also due for routine lab work including lipids and HIV screening.       Review of Systems   Constitutional:  Negative for fever.   Respiratory:  Negative for cough, chest tightness and shortness of breath.    Cardiovascular:  Negative for chest pain and palpitations.   Gastrointestinal:  Negative for abdominal pain, anal bleeding, blood in stool, constipation, diarrhea, nausea and vomiting.   Musculoskeletal:  Positive for back pain.   Neurological:  Negative for dizziness and light-headedness.   All other systems reviewed and are negative.      Objective   BP (!) 160/100 (BP Location: Right arm, Patient Position: Sitting)   Pulse 76   Temp 36.7 °C (98 °F)   Resp 18   Ht 1.74 m (5' 8.5\")   Wt 100 kg (221 lb)   SpO2 95%   BMI 33.11 kg/m²     Physical Exam  Vitals reviewed.   Constitutional:       General: He is not in acute distress.     Appearance: Normal appearance. He is not toxic-appearing.   HENT:      Head: Normocephalic and atraumatic.      Nose: Nose normal.   Eyes:      Extraocular Movements: Extraocular movements intact.   Cardiovascular:      Rate and Rhythm: Normal rate and regular rhythm.      Heart sounds: No murmur heard.     No friction rub. No gallop.   Pulmonary:      Effort: Pulmonary effort is normal.   Skin:     General: Skin is warm and dry.   Neurological:      General: No focal " deficit present.      Mental Status: He is alert and oriented to person, place, and time.   Psychiatric:         Mood and Affect: Mood normal.         Behavior: Behavior normal.         Assessment/Plan   Problem List Items Addressed This Visit       Anxiety disorder     Refill for Wellbutrin sent into the pharmacy.  Patient is stable on this medication and has been taking it for some time without any side effects.         Relevant Medications    buPROPion XL (Wellbutrin XL) 300 mg 24 hr tablet    buPROPion XL (Wellbutrin XL) 150 mg 24 hr tablet    Hypertension, essential     In office blood pressure initially 180/118 and on recheck was 160/100.  Most recent in office blood pressure before this was 156/106 on 2/8/2024.  Patient does not check his blood pressures regularly at home.  He is currently taking losartan 100 mg daily, metoprolol 25 mg twice daily, and hydrochlorothiazide 25 mg daily.  He is allergic to amlodipine.  As his blood pressure is still elevated we will change his hydrochlorothiazide to chlorthalidone 25 mg.  Instructed patient to stop taking his hydrochlorothiazide and start the chlorthalidone.  Also recommend he start checking his blood pressures at home on a routine basis and keep a BP log.  He will follow-up in approximately 1 month and have BMP done to ensure about there is no electrolyte abnormalities.         Relevant Medications    chlorthalidone (Hygroton) 25 mg tablet    Other Relevant Orders    Referral to Cardiology    Basic metabolic panel    Lumbar foraminal stenosis    Chronic left-sided low back pain without sciatica - Primary     Recommended continue with follow-up with spinal surgeon as patient has been doing for several years now.  Also provided new referral for PT to continue rehabbing his back and hip.  We have also provided him a handicap placard as that is difficult for him to ambulate long distances at work.         Relevant Orders    Disability Placard    Referral to  Physical Therapy    Referral to Physical Therapy    Atrial fibrillation (CMS/[facility])     Patient sees cardiology nurse practitioner takes flecainide and metoprolol status post ablation several years ago with Dr. Chow.  Provided referral to Dr. Arnett if patient wishes to establish with  electrophysiologist.         Relevant Orders    Referral to Cardiology     Other Visit Diagnoses       Healthcare maintenance        Routine blood work including lipid, CBC, BMP, PSA.  Scheduled for colonoscopy.  CT calcium score last year was 0.    Relevant Orders    Lipid panel    CBC    Tdap vaccine, age 7 years and older  (BOOSTRIX) (Completed)    HIV 1/2 Antigen/Antibody Screen with Reflex to Confirmation    Prostate Spec.Ag,Screen            Reviewed Social Determinants of health with patient, discussed healthy lifestyle including 150 minutes of physical activity per week  Ordered/Reviewed baseline labwork -CBC, CMP, Lipid Panel  Immunizations Up-to-Date

## 2024-02-28 NOTE — ASSESSMENT & PLAN NOTE
Patient sees cardiology nurse practitioner takes flecainide and metoprolol status post ablation several years ago with Dr. Chow.  Provided referral to Dr. Arnett if patient wishes to establish with  electrophysiologist.

## 2024-02-28 NOTE — PROGRESS NOTES
I reviewed the resident/fellow's documentation and discussed the patient with the resident/fellow. I agree with the resident/fellow's medical decision making as documented in the note.     Leesa Cox, DO

## 2024-02-28 NOTE — ASSESSMENT & PLAN NOTE
Recommended continue with follow-up with spinal surgeon as patient has been doing for several years now.  Also provided new referral for PT to continue rehabbing his back and hip.  We have also provided him a handicap placard as that is difficult for him to ambulate long distances at work.

## 2024-02-28 NOTE — ASSESSMENT & PLAN NOTE
In office blood pressure initially 180/118 and on recheck was 160/100.  Most recent in office blood pressure before this was 156/106 on 2/8/2024.  Patient does not check his blood pressures regularly at home.  He is currently taking losartan 100 mg daily, metoprolol 25 mg twice daily, and hydrochlorothiazide 25 mg daily.  He is allergic to amlodipine.  As his blood pressure is still elevated we will change his hydrochlorothiazide to chlorthalidone 25 mg.  Instructed patient to stop taking his hydrochlorothiazide and start the chlorthalidone.  Also recommend he start checking his blood pressures at home on a routine basis and keep a BP log.  He will follow-up in approximately 1 month and have BMP done to ensure about there is no electrolyte abnormalities.

## 2024-02-28 NOTE — PATIENT INSTRUCTIONS
Stop taking hydrochlorothiazide   Start taking Chlorthalidone   Establish with Dr. Arnett cardiology

## 2024-02-29 ENCOUNTER — TELEPHONE (OUTPATIENT)
Dept: PRIMARY CARE | Facility: CLINIC | Age: 59
End: 2024-02-29
Payer: COMMERCIAL

## 2024-02-29 DIAGNOSIS — R97.20 ELEVATED PSA: Primary | ICD-10-CM

## 2024-02-29 DIAGNOSIS — K76.0 FATTY LIVER: Primary | ICD-10-CM

## 2024-02-29 LAB
HAV AB SER QL IA: REACTIVE
HAV IGM SER QL: NONREACTIVE

## 2024-03-01 DIAGNOSIS — F41.9 ANXIETY DISORDER, UNSPECIFIED TYPE: ICD-10-CM

## 2024-03-01 DIAGNOSIS — I10 HYPERTENSION, ESSENTIAL: ICD-10-CM

## 2024-03-01 DIAGNOSIS — F41.1 GENERALIZED ANXIETY DISORDER: ICD-10-CM

## 2024-03-01 LAB — MITOCHONDRIA AB SER QL IF: NEGATIVE

## 2024-03-01 RX ORDER — CHLORTHALIDONE 25 MG/1
25 TABLET ORAL DAILY
Qty: 30 TABLET | Refills: 1 | Status: SHIPPED | OUTPATIENT
Start: 2024-03-01 | End: 2024-04-18 | Stop reason: SDUPTHER

## 2024-03-01 RX ORDER — BUPROPION HYDROCHLORIDE 300 MG/1
300 TABLET ORAL DAILY
Qty: 30 TABLET | Refills: 2 | Status: SHIPPED | OUTPATIENT
Start: 2024-03-01 | End: 2024-05-13

## 2024-03-01 RX ORDER — BUPROPION HYDROCHLORIDE 150 MG/1
TABLET ORAL
Qty: 30 TABLET | Refills: 2 | Status: SHIPPED | OUTPATIENT
Start: 2024-03-01 | End: 2024-05-13

## 2024-03-03 LAB — SMOOTH MUSCLE AB SER QL IF: NEGATIVE

## 2024-03-03 RX ORDER — SODIUM, POTASSIUM,MAG SULFATES 17.5-3.13G
SOLUTION, RECONSTITUTED, ORAL ORAL
Qty: 1 EACH | Refills: 0 | Status: SHIPPED | OUTPATIENT
Start: 2024-03-03 | End: 2024-04-03 | Stop reason: ALTCHOICE

## 2024-03-06 ENCOUNTER — CLINICAL SUPPORT (OUTPATIENT)
Dept: GASTROENTEROLOGY | Facility: CLINIC | Age: 59
End: 2024-03-06
Payer: COMMERCIAL

## 2024-03-06 DIAGNOSIS — K76.0 FATTY LIVER: ICD-10-CM

## 2024-03-06 PROCEDURE — 91200 LIVER ELASTOGRAPHY: CPT | Performed by: INTERNAL MEDICINE

## 2024-03-07 ENCOUNTER — APPOINTMENT (OUTPATIENT)
Dept: GASTROENTEROLOGY | Facility: CLINIC | Age: 59
End: 2024-03-07
Payer: COMMERCIAL

## 2024-03-13 ENCOUNTER — HOSPITAL ENCOUNTER (OUTPATIENT)
Dept: GASTROENTEROLOGY | Facility: HOSPITAL | Age: 59
Setting detail: OUTPATIENT SURGERY
Discharge: HOME | End: 2024-03-13
Payer: COMMERCIAL

## 2024-03-13 ENCOUNTER — ANESTHESIA (OUTPATIENT)
Dept: GASTROENTEROLOGY | Facility: HOSPITAL | Age: 59
End: 2024-03-13
Payer: COMMERCIAL

## 2024-03-13 ENCOUNTER — ANESTHESIA EVENT (OUTPATIENT)
Dept: GASTROENTEROLOGY | Facility: HOSPITAL | Age: 59
End: 2024-03-13
Payer: COMMERCIAL

## 2024-03-13 VITALS
HEART RATE: 82 BPM | WEIGHT: 210 LBS | BODY MASS INDEX: 31.1 KG/M2 | TEMPERATURE: 97.7 F | DIASTOLIC BLOOD PRESSURE: 68 MMHG | HEIGHT: 69 IN | OXYGEN SATURATION: 97 % | SYSTOLIC BLOOD PRESSURE: 122 MMHG | RESPIRATION RATE: 16 BRPM

## 2024-03-13 DIAGNOSIS — K62.5 RECTAL BLEEDING: ICD-10-CM

## 2024-03-13 DIAGNOSIS — R10.9 ABDOMINAL PAIN, UNSPECIFIED ABDOMINAL LOCATION: Primary | ICD-10-CM

## 2024-03-13 DIAGNOSIS — R10.811 RIGHT UPPER QUADRANT ABDOMINAL TENDERNESS WITHOUT REBOUND TENDERNESS: ICD-10-CM

## 2024-03-13 PROCEDURE — 88305 TISSUE EXAM BY PATHOLOGIST: CPT

## 2024-03-13 PROCEDURE — 3700000002 HC GENERAL ANESTHESIA TIME - EACH INCREMENTAL 1 MINUTE: Performed by: INTERNAL MEDICINE

## 2024-03-13 PROCEDURE — 7100000009 HC PHASE TWO TIME - INITIAL BASE CHARGE: Performed by: INTERNAL MEDICINE

## 2024-03-13 PROCEDURE — 2500000004 HC RX 250 GENERAL PHARMACY W/ HCPCS (ALT 636 FOR OP/ED): Performed by: ANESTHESIOLOGIST ASSISTANT

## 2024-03-13 PROCEDURE — 3700000001 HC GENERAL ANESTHESIA TIME - INITIAL BASE CHARGE: Performed by: INTERNAL MEDICINE

## 2024-03-13 PROCEDURE — 45385 COLONOSCOPY W/LESION REMOVAL: CPT | Performed by: INTERNAL MEDICINE

## 2024-03-13 PROCEDURE — 2500000005 HC RX 250 GENERAL PHARMACY W/O HCPCS: Performed by: ANESTHESIOLOGIST ASSISTANT

## 2024-03-13 PROCEDURE — 43239 EGD BIOPSY SINGLE/MULTIPLE: CPT | Performed by: INTERNAL MEDICINE

## 2024-03-13 PROCEDURE — 88305 TISSUE EXAM BY PATHOLOGIST: CPT | Mod: TC,59,STJLAB | Performed by: INTERNAL MEDICINE

## 2024-03-13 PROCEDURE — 2500000004 HC RX 250 GENERAL PHARMACY W/ HCPCS (ALT 636 FOR OP/ED): Performed by: INTERNAL MEDICINE

## 2024-03-13 PROCEDURE — A45385 PR COLONOSCOPY,REMV LESN,SNARE: Performed by: ANESTHESIOLOGIST ASSISTANT

## 2024-03-13 PROCEDURE — A45385 PR COLONOSCOPY,REMV LESN,SNARE: Performed by: STUDENT IN AN ORGANIZED HEALTH CARE EDUCATION/TRAINING PROGRAM

## 2024-03-13 RX ORDER — SODIUM CHLORIDE, SODIUM LACTATE, POTASSIUM CHLORIDE, CALCIUM CHLORIDE 600; 310; 30; 20 MG/100ML; MG/100ML; MG/100ML; MG/100ML
20 INJECTION, SOLUTION INTRAVENOUS CONTINUOUS
Status: DISCONTINUED | OUTPATIENT
Start: 2024-03-13 | End: 2024-03-14 | Stop reason: HOSPADM

## 2024-03-13 RX ORDER — GLUCOSAMINE/CHONDRO SU A 500-400 MG
1 TABLET ORAL 3 TIMES DAILY
COMMUNITY

## 2024-03-13 RX ORDER — MEPERIDINE HYDROCHLORIDE 50 MG/ML
12.5 INJECTION INTRAMUSCULAR; INTRAVENOUS; SUBCUTANEOUS EVERY 10 MIN PRN
OUTPATIENT
Start: 2024-03-13

## 2024-03-13 RX ORDER — OXYCODONE HYDROCHLORIDE 5 MG/1
5 TABLET ORAL EVERY 4 HOURS PRN
OUTPATIENT
Start: 2024-03-13

## 2024-03-13 RX ORDER — ONDANSETRON HYDROCHLORIDE 2 MG/ML
4 INJECTION, SOLUTION INTRAVENOUS ONCE AS NEEDED
OUTPATIENT
Start: 2024-03-13

## 2024-03-13 RX ORDER — FENTANYL CITRATE 50 UG/ML
25 INJECTION, SOLUTION INTRAMUSCULAR; INTRAVENOUS EVERY 5 MIN PRN
OUTPATIENT
Start: 2024-03-13

## 2024-03-13 RX ORDER — LIDOCAINE HYDROCHLORIDE 10 MG/ML
0.1 INJECTION INFILTRATION; PERINEURAL ONCE
OUTPATIENT
Start: 2024-03-13 | End: 2024-03-13

## 2024-03-13 RX ORDER — GLYCOPYRROLATE 0.2 MG/ML
INJECTION INTRAMUSCULAR; INTRAVENOUS AS NEEDED
Status: DISCONTINUED | OUTPATIENT
Start: 2024-03-13 | End: 2024-03-13

## 2024-03-13 RX ORDER — ALBUTEROL SULFATE 0.83 MG/ML
2.5 SOLUTION RESPIRATORY (INHALATION) ONCE AS NEEDED
OUTPATIENT
Start: 2024-03-13

## 2024-03-13 RX ORDER — LABETALOL HYDROCHLORIDE 5 MG/ML
5 INJECTION, SOLUTION INTRAVENOUS ONCE AS NEEDED
OUTPATIENT
Start: 2024-03-13

## 2024-03-13 RX ORDER — PROPOFOL 10 MG/ML
INJECTION, EMULSION INTRAVENOUS AS NEEDED
Status: DISCONTINUED | OUTPATIENT
Start: 2024-03-13 | End: 2024-03-13

## 2024-03-13 RX ORDER — SODIUM CHLORIDE, SODIUM LACTATE, POTASSIUM CHLORIDE, CALCIUM CHLORIDE 600; 310; 30; 20 MG/100ML; MG/100ML; MG/100ML; MG/100ML
100 INJECTION, SOLUTION INTRAVENOUS CONTINUOUS
OUTPATIENT
Start: 2024-03-13

## 2024-03-13 RX ORDER — LIDOCAINE HYDROCHLORIDE 20 MG/ML
INJECTION, SOLUTION EPIDURAL; INFILTRATION; INTRACAUDAL; PERINEURAL AS NEEDED
Status: DISCONTINUED | OUTPATIENT
Start: 2024-03-13 | End: 2024-03-13

## 2024-03-13 RX ADMIN — SODIUM CHLORIDE, POTASSIUM CHLORIDE, SODIUM LACTATE AND CALCIUM CHLORIDE: 600; 310; 30; 20 INJECTION, SOLUTION INTRAVENOUS at 13:50

## 2024-03-13 RX ADMIN — GLYCOPYRROLATE 0.2 MG: 0.2 INJECTION, SOLUTION INTRAMUSCULAR; INTRAVENOUS at 13:53

## 2024-03-13 RX ADMIN — LIDOCAINE HYDROCHLORIDE 100 MG: 20 INJECTION, SOLUTION EPIDURAL; INFILTRATION; INTRACAUDAL; PERINEURAL at 13:53

## 2024-03-13 RX ADMIN — PROPOFOL 500 MG: 10 INJECTION, EMULSION INTRAVENOUS at 13:53

## 2024-03-13 SDOH — HEALTH STABILITY: MENTAL HEALTH: CURRENT SMOKER: 0

## 2024-03-13 ASSESSMENT — PAIN SCALES - GENERAL
PAINLEVEL_OUTOF10: 3
PAIN_LEVEL: 0
PAINLEVEL_OUTOF10: 0 - NO PAIN

## 2024-03-13 ASSESSMENT — COLUMBIA-SUICIDE SEVERITY RATING SCALE - C-SSRS
2. HAVE YOU ACTUALLY HAD ANY THOUGHTS OF KILLING YOURSELF?: NO
1. IN THE PAST MONTH, HAVE YOU WISHED YOU WERE DEAD OR WISHED YOU COULD GO TO SLEEP AND NOT WAKE UP?: NO
6. HAVE YOU EVER DONE ANYTHING, STARTED TO DO ANYTHING, OR PREPARED TO DO ANYTHING TO END YOUR LIFE?: NO

## 2024-03-13 ASSESSMENT — PAIN - FUNCTIONAL ASSESSMENT
PAIN_FUNCTIONAL_ASSESSMENT: 0-10
PAIN_FUNCTIONAL_ASSESSMENT: 0-10

## 2024-03-13 NOTE — ANESTHESIA PREPROCEDURE EVALUATION
Patient: Kvng June    Procedure Information       Date/Time: 03/13/24 1300    Scheduled providers: Jomar Conde MD    Procedures:       COLONOSCOPY      EGD    Location: [facility] [place]            Relevant Problems   Cardiovascular   (+) Atrial fibrillation (CMS/HCC)   (+) Hypertension, essential      Neuro/Psych   (+) Anxiety disorder   (+) Cervical radiculitis   (+) Lumbar radiculopathy      Musculoskeletal   (+) Chronic left-sided low back pain without sciatica   (+) Idiopathic cervical spinal stenosis   (+) Lumbar foraminal stenosis   (+) Spinal stenosis of lumbar region with neurogenic claudication      Infectious Disease   (+) Herpes simplex   (+) Paronychia of finger of left hand       Clinical information reviewed:   Tobacco  Allergies  Meds   Med Hx  Surg Hx   Fam Hx  Soc Hx        NPO Detail:  NPO/Void Status  Date of Last Liquid: 03/13/24  Time of Last Liquid: 0700  Date of Last Solid: 03/11/24  Time of Last Solid: 2000  Last Intake Type: Clear fluids; GI prep  Time of Last Void: 1100         Physical Exam    Airway  Mallampati: II  TM distance: >3 FB  Neck ROM: full     Cardiovascular - normal exam  Rhythm: regular  Rate: normal     Dental - normal exam     Pulmonary - normal exam  Breath sounds clear to auscultation     Abdominal   (+) obese  Abdomen: soft  Bowel sounds: normal           Anesthesia Plan    History of general anesthesia?: yes  History of complications of general anesthesia?: no    ASA 3     general     The patient is not a current smoker.  Patient was previously instructed to abstain from smoking on day of procedure.  Patient did not smoke on day of procedure.  Education provided regarding risk of obstructive sleep apnea.  intravenous induction   Anesthetic plan and risks discussed with patient.  Use of blood products discussed with patient who.    Plan discussed with CAA and CRNA.

## 2024-03-13 NOTE — DISCHARGE INSTRUCTIONS
Patient Instructions after a Colonoscopy      The anesthetics, sedatives or narcotics which were given to you today will be acting in your body for the next 24 hours, so you might feel a little sleepy or groggy.  This feeling should slowly wear off. Carefully read and follow the instructions.     You received sedation today:  - Do not drive or operate any machinery or power tools of any kind.   - No alcoholic beverages today, not even beer or wine.  - Do not make any important decisions or sign any legal documents.  - No over the counter medications that contain alcohol or that may cause drowsiness.  - Do not make any important decisions or sign any legal documents.    While it is common to experience mild to moderate abdominal distention, gas, or belching after your procedure, if any of these symptoms occur following discharge from the GI Lab or within one week of having your procedure, call the [facility] to be advised whether a visit to your nearest Urgent Care or Emergency Department is indicated.  Take this paper with you if you go.     - If you develop an allergic reaction to the medications that were given during your procedure such as difficulty breathing, rash, hives, severe nausea, vomiting or lightheadedness.  - If you experience chest pain, shortness of breath, severe abdominal pain, fevers and chills.  -If you develop signs and symptoms of bleeding such as blood in your spit, if your stools turn black, tarry, or bloody  - If you have not urinated within 8 hours following your procedure.  - If your IV site becomes painful, red, inflamed, or looks infected.    If you received a biopsy/polypectomy/sphincterotomy the following instructions apply below:    __ Do not use Aspirin containing products, non-steroidal medications or anti-coagulants for one week following your procedure. (Examples of these types of medications are: Advil, Arthrotec, Aleve, Coumadin, Ecotrin, Heparin, Ibuprofen,  Indocin, Motrin, Naprosyn, Nuprin, Plavix, Vioxx, and Voltarin, or their generic forms.  This list is not all-inclusive.  Check with your physician or pharmacist before resuming medications.)   __ Eat a soft diet today.  Avoid foods that are poorly digested for the next 24 hours.  These foods would include: nuts, beans, lettuce, red meats, and fried foods. Start with liquids and advance your diet as tolerated, gradually work up to eating solids.   __ Do not have a Barium Study or Enema for one week.    Your physician recommends the additional following instructions:    -You have a contact number available for emergencies. The signs and symptoms of potential delayed complications were discussed with you. You may return to normal activities tomorrow.  -Resume your previous diet.  -Continue your present medications.   -We are waiting for your pathology results.  -Your physician has recommended a repeat colonoscopy (date to be determined after pending pathology results are reviewed) for surveillance based on pathology results.  -The findings and recommendations have been discussed with you.  -The findings and recommendations were discussed with your family.  - Please see Medication Reconciliation Form for new medication/medications prescribed.       If you experience any problems or have any questions following discharge from the GI Lab, please call:        Nurse Signature                                                                        Date___________________                                                                            Patient/Responsible Party Signature                                        Date___________________

## 2024-03-13 NOTE — ANESTHESIA POSTPROCEDURE EVALUATION
Patient: Kvng June    Procedure Summary       Date: 03/13/24 Room / Location: [facility] [place]    Anesthesia Start: 1350 Anesthesia Stop: 1423    Procedures:       COLONOSCOPY      EGD Diagnosis:       Abdominal pain, unspecified abdominal location      Rectal bleeding      Right upper quadrant abdominal tenderness without rebound tenderness      Abdominal pain, unspecified abdominal location    Scheduled Providers: Faina Duenas MD Responsible Provider: Ike Guevara DO    Anesthesia Type: general ASA Status: 3            Anesthesia Type: general    Vitals Value Taken Time   /68 03/13/24 1423   Temp 36.5 °C (97.7 °F) 03/13/24 1423   Pulse 80 03/13/24 1423   Resp 16 03/13/24 1423   SpO2 96 % 03/13/24 1423       Anesthesia Post Evaluation    Patient location during evaluation: PACU  Patient participation: complete - patient participated  Level of consciousness: sleepy but conscious  Pain score: 0  Pain management: adequate  Airway patency: patent  Cardiovascular status: acceptable  Respiratory status: acceptable  Hydration status: acceptable  Postoperative Nausea and Vomiting: none    No notable events documented.

## 2024-03-20 LAB
LABORATORY COMMENT REPORT: NORMAL
PATH REPORT.FINAL DX SPEC: NORMAL
PATH REPORT.GROSS SPEC: NORMAL
PATH REPORT.RELEVANT HX SPEC: NORMAL
PATH REPORT.TOTAL CANCER: NORMAL

## 2024-04-03 ENCOUNTER — OFFICE VISIT (OUTPATIENT)
Dept: GASTROENTEROLOGY | Facility: CLINIC | Age: 59
End: 2024-04-03
Payer: COMMERCIAL

## 2024-04-03 VITALS
HEART RATE: 73 BPM | BODY MASS INDEX: 32.36 KG/M2 | WEIGHT: 216 LBS | SYSTOLIC BLOOD PRESSURE: 150 MMHG | DIASTOLIC BLOOD PRESSURE: 102 MMHG | TEMPERATURE: 98.1 F

## 2024-04-03 DIAGNOSIS — K58.1 IRRITABLE BOWEL SYNDROME WITH CONSTIPATION: Primary | ICD-10-CM

## 2024-04-03 PROCEDURE — 99214 OFFICE O/P EST MOD 30 MIN: CPT | Performed by: NURSE PRACTITIONER

## 2024-04-03 PROCEDURE — 1036F TOBACCO NON-USER: CPT | Performed by: NURSE PRACTITIONER

## 2024-04-03 PROCEDURE — 3077F SYST BP >= 140 MM HG: CPT | Performed by: NURSE PRACTITIONER

## 2024-04-03 PROCEDURE — 3080F DIAST BP >= 90 MM HG: CPT | Performed by: NURSE PRACTITIONER

## 2024-04-03 RX ORDER — POLYETHYLENE GLYCOL 3350 17 G/17G
17 POWDER, FOR SOLUTION ORAL AS NEEDED
Qty: 30 EACH | Refills: 3 | Status: SHIPPED | OUTPATIENT
Start: 2024-04-03

## 2024-04-03 RX ORDER — WHEAT DEXTRIN 3 G/4 G
POWDER (GRAM) ORAL
Qty: 248 G | Refills: 1 | Status: SHIPPED | OUTPATIENT
Start: 2024-04-03

## 2024-04-03 RX ORDER — PEPPERMINT OIL 90 MG
1 CAPSULE, DELAYED, AND EXTENDED RELEASE ORAL 2 TIMES DAILY PRN
Qty: 30 CAPSULE | Refills: 2 | Status: SHIPPED | OUTPATIENT
Start: 2024-04-03 | End: 2024-05-03

## 2024-04-03 ASSESSMENT — ENCOUNTER SYMPTOMS
ENDOCRINE NEGATIVE: 1
FEVER: 0
ALLERGIC/IMMUNOLOGIC NEGATIVE: 1
STRIDOR: 0
CHEST TIGHTNESS: 0
CARDIOVASCULAR NEGATIVE: 1
HEMATOLOGIC/LYMPHATIC NEGATIVE: 1
APNEA: 0
RESPIRATORY NEGATIVE: 1
DIAPHORESIS: 0
FATIGUE: 0
MUSCULOSKELETAL NEGATIVE: 1
DIFFICULTY URINATING: 0
NEUROLOGICAL NEGATIVE: 1
CHILLS: 0
SHORTNESS OF BREATH: 0
WHEEZING: 0
EYES NEGATIVE: 1
PSYCHIATRIC NEGATIVE: 1
ROS GI COMMENTS: SEE HPI
COUGH: 0

## 2024-04-03 ASSESSMENT — PAIN SCALES - GENERAL: PAINLEVEL: 4

## 2024-04-03 NOTE — PROGRESS NOTES
Subjective   Patient ID: Kvng June is a 58 y.o. male who presents for No chief complaint on file.. PMH: sleep apnea , heart ablation (atrial fibrillation), no blood thinners, HTN  Seen by PCP with rectal bleeding, lower abdominal pain similar to previous diverticulitis infection 4 years ago  He was treated with Augmentin and underwent a CT that showed diverticulosis     He is using benefiber and is still having some lower abdominal cramping.  He has a BM daily, sometimes has constipation.   He is not taking fiber consistently.  No longer having rectal bleeding.         Prior colonoscopy in 2020 was unremarkable    Had biopsies of liver---showed no cirrhosis or fibrosis.    Colonoscopy and EGD     Family Hx: Denies a family hx of CRC, GI cancers, bladder  Grandfather had melanoma of colon   Quit smoking a few years ago         Review of Systems   Constitutional:  Negative for chills, diaphoresis, fatigue and fever.   HENT: Negative.     Eyes: Negative.    Respiratory: Negative.  Negative for apnea, cough, chest tightness, shortness of breath, wheezing and stridor.    Cardiovascular: Negative.    Gastrointestinal:         See HPI    Endocrine: Negative.    Genitourinary: Negative.  Negative for difficulty urinating.   Musculoskeletal: Negative.    Skin: Negative.    Allergic/Immunologic: Negative.    Neurological: Negative.    Hematological: Negative.    Psychiatric/Behavioral: Negative.         Objective   Physical Exam  Vitals reviewed.   Constitutional:       General: He is awake.      Appearance: Normal appearance.   HENT:      Head: Normocephalic and atraumatic.      Nose: Nose normal.      Mouth/Throat:      Mouth: Mucous membranes are moist.   Eyes:      Pupils: Pupils are equal, round, and reactive to light.   Cardiovascular:      Rate and Rhythm: Normal rate.   Pulmonary:      Effort: Pulmonary effort is normal.   Neurological:      Mental Status: He is alert and oriented to person, place, and time.  Mental status is at baseline.   Psychiatric:         Attention and Perception: Attention and perception normal.         Mood and Affect: Mood normal.         Behavior: Behavior normal.          Assessment/Plan   Diagnoses and all orders for this visit:  Rectal bleeding  Chronic constipation/IBS-C  -     wheat dextrin (Benefiber Sugar Free, dextrin,) 3 gram/4 gram powder; Take 1 teaspoon daily with a full glass of water (8 ounces)  - miralax 1-2 times/day as needed   Fatty liver      58 year old male with a PMH of sleep apnea , atrial fibrillation s/p ablation and HTN, IBS who presents today for abdominal pain follow-up. He underwent a colonoscopy and EGD which were essentially normal other than 2 hyperplastic polyps.  He had extensive labs including a UA as well as an negative RUQ US-only showing fatty liver. He had a prior biopsy of the liver in 2020 which showed steatosis without fibrosis or cirrhosis. He continues to have lower abdominal cramping which does not seem to be associated with Bm's. He will start fiber and Miralax, and Ibgard.      JUAN Potter-CNP 04/03/24 1:06 PM

## 2024-04-03 NOTE — PATIENT INSTRUCTIONS
Start benefiber (one teaspoon ) daily     Use Miralax daily or as needed for constipation    You can try Ibgard     If this does not help I would consider stopping flax seed for 2 week    You can could consider a lactose free diet x 2 weeks (no cheese, no milk, no yogurt, no ice cream)    I would keep track of pain and see if it's associated with a BM    PATIENT EDUCATION:    Teaching given on NAFLD (fatty liver), how common it is, what it is, and the risk factors for NAFLD.   Lifestyle changes can help prevent damage and may reverse it in the early stages.  First line of treatment for overweight or obese person is weight loss, that is gradual and sustained.  Recommendation is 5-10% over 6-12 months. Physical activity is important in treating fatty liver. Further recommendation is regular exercise that includes aerobic and resistance.  Most effective treatment is exercise that includes resistance, (weight training/gym training) to help mobilize fat out of the liver.  Good nutrition that includes low sugar fresh fruits and low starch vegetables, and using healthy oils/fats for cooking.  Lower carb diet by decreasing intake of added sugar, sugary beverages, high fructose corn syrup, and as much as possible avoiding foods made with white flour.  Should limit alcohol intake in NAFLD.  Keeping cholesterol and blood sugar under control in those with diabetes and high cholesterol.  Eating healthy and exercising regularly may help prevent liver damage from starting or reverse it in the early stages.    What are Carbohydrates?  Come in a variety of forms, most common are sugar, fiber, and starches  Healthy carbohydrates: unprocessed or minimally processed foods, vegetables, some fruits, and beans  Unhealthy carbohydrates: sugar, white bread, pastries, soda, highly processed or refined foods.     Foods High in Carbohydrates  High starch foods  Grains (rice ,wheat, barley)  Corn  Bread, pasta, cereal  Potatoes and root  vegetables  Soda  Chips    Fruits High in Sugar  Mangoes  Grapes  Cherries  Pears  Figs  Watermelon   Bananas  Candied fruit  Fruit juices  Canned Fruit  Dried fruits    Constipation refers to a change in bowel habits, but it has varied meanings. Stools may be too hard or too small, difficult to pass, or infrequent (less than three times per week). People with constipation may also notice a frequent need to strain and a sense that the bowels are not empty.    Your bowels like consistency and routine.     Behavior changes -- The bowels are most active following meals, and this is often the time when stools will pass most readily. If you ignore your body's signals to have a bowel movement, the signals become weaker and weaker over time.    By paying close attention to these signals, you may have an easier time moving your bowels. Drinking a caffeine-containing beverage in the morning may also be helpful.    Increase fiber -- Increasing fiber in your diet may reduce or eliminate constipation. The recommended amount of dietary fiber is 20 to 35 grams of fiber per day. Examples of high fiber foods include pears, spinach, apples, raspberries.     Fiber side effects -- Consuming large amounts of fiber can cause abdominal bloating or gas; this can be minimized by starting with a small amount and slowly increasing until stools become softer and more frequent.    More recently, kiwi fruit has been identified as helping with constipation. It is recommended to eat two daily.    Whatever you decide to use, please try daily for 2 weeks to notice improvement.     If you continue to have constipation despite trying these methods, please schedule a follow-up appointment.

## 2024-04-10 DIAGNOSIS — M25.552 BILATERAL HIP PAIN: Primary | ICD-10-CM

## 2024-04-10 DIAGNOSIS — M25.551 BILATERAL HIP PAIN: Primary | ICD-10-CM

## 2024-04-15 DIAGNOSIS — I10 HYPERTENSION, ESSENTIAL: ICD-10-CM

## 2024-04-18 RX ORDER — CHLORTHALIDONE 25 MG/1
25 TABLET ORAL DAILY
Qty: 30 TABLET | Refills: 0 | Status: SHIPPED | OUTPATIENT
Start: 2024-04-18 | End: 2024-05-13

## 2024-04-24 ENCOUNTER — EVALUATION (OUTPATIENT)
Dept: PHYSICAL THERAPY | Facility: CLINIC | Age: 59
End: 2024-04-24
Payer: COMMERCIAL

## 2024-04-24 ENCOUNTER — LAB (OUTPATIENT)
Dept: LAB | Facility: LAB | Age: 59
End: 2024-04-24
Payer: COMMERCIAL

## 2024-04-24 DIAGNOSIS — I10 HYPERTENSION, ESSENTIAL: ICD-10-CM

## 2024-04-24 DIAGNOSIS — R97.20 ELEVATED PSA: ICD-10-CM

## 2024-04-24 DIAGNOSIS — M25.552 BILATERAL HIP PAIN: ICD-10-CM

## 2024-04-24 DIAGNOSIS — Z00.00 HEALTHCARE MAINTENANCE: ICD-10-CM

## 2024-04-24 DIAGNOSIS — M25.551 BILATERAL HIP PAIN: ICD-10-CM

## 2024-04-24 DIAGNOSIS — R26.9 ABNORMAL GAIT: Primary | ICD-10-CM

## 2024-04-24 LAB
ANION GAP SERPL CALC-SCNC: 14 MMOL/L (ref 10–20)
BUN SERPL-MCNC: 23 MG/DL (ref 6–23)
CALCIUM SERPL-MCNC: 10.4 MG/DL (ref 8.6–10.3)
CHLORIDE SERPL-SCNC: 91 MMOL/L (ref 98–107)
CHOLEST SERPL-MCNC: 169 MG/DL (ref 0–199)
CHOLESTEROL/HDL RATIO: 2.4
CO2 SERPL-SCNC: 30 MMOL/L (ref 21–32)
CREAT SERPL-MCNC: 1.07 MG/DL (ref 0.5–1.3)
EGFRCR SERPLBLD CKD-EPI 2021: 80 ML/MIN/1.73M*2
GLUCOSE SERPL-MCNC: 111 MG/DL (ref 74–99)
HDLC SERPL-MCNC: 69.3 MG/DL
LDLC SERPL CALC-MCNC: 86 MG/DL
NON HDL CHOLESTEROL: 100 MG/DL (ref 0–149)
POTASSIUM SERPL-SCNC: 4.1 MMOL/L (ref 3.5–5.3)
PSA SERPL-MCNC: 4.09 NG/ML
SODIUM SERPL-SCNC: 131 MMOL/L (ref 136–145)
TRIGL SERPL-MCNC: 71 MG/DL (ref 0–149)
VLDL: 14 MG/DL (ref 0–40)

## 2024-04-24 PROCEDURE — 97161 PT EVAL LOW COMPLEX 20 MIN: CPT | Mod: GP

## 2024-04-24 PROCEDURE — 80061 LIPID PANEL: CPT

## 2024-04-24 PROCEDURE — 84153 ASSAY OF PSA TOTAL: CPT

## 2024-04-24 PROCEDURE — 36415 COLL VENOUS BLD VENIPUNCTURE: CPT

## 2024-04-24 PROCEDURE — 97110 THERAPEUTIC EXERCISES: CPT | Mod: GP

## 2024-04-24 PROCEDURE — 97535 SELF CARE MNGMENT TRAINING: CPT | Mod: GP

## 2024-04-24 PROCEDURE — 80048 BASIC METABOLIC PNL TOTAL CA: CPT

## 2024-04-24 ASSESSMENT — PATIENT HEALTH QUESTIONNAIRE - PHQ9
2. FEELING DOWN, DEPRESSED OR HOPELESS: SEVERAL DAYS
1. LITTLE INTEREST OR PLEASURE IN DOING THINGS: NOT AT ALL
SUM OF ALL RESPONSES TO PHQ9 QUESTIONS 1 AND 2: 1
10. IF YOU CHECKED OFF ANY PROBLEMS, HOW DIFFICULT HAVE THESE PROBLEMS MADE IT FOR YOU TO DO YOUR WORK, TAKE CARE OF THINGS AT HOME, OR GET ALONG WITH OTHER PEOPLE: SOMEWHAT DIFFICULT

## 2024-04-24 ASSESSMENT — ENCOUNTER SYMPTOMS
LOSS OF SENSATION IN FEET: 0
DEPRESSION: 0
OCCASIONAL FEELINGS OF UNSTEADINESS: 1

## 2024-04-24 NOTE — PROGRESS NOTES
Physical Therapy Evaluation and Treatment      Patient Name: Kvng June  MRN: [id number]  Today's Date: 4/24/2024  Time Calculation  Start Time: 1200  Stop Time: 1254  Time Calculation (min): 54 min    Insurance:  Visit number:  1  20 vs/yr  AIM Auth Required within 2 business days      Assessment:  Pt. presented with B (L > R) pain which was likely d/t chronic muscular strains resulting from previous lumbar stenosis & resultant L2-L4 fusion, & subsequently, altered body/joint mechanics, decreased mm. strength/endurance, & abnormal neuromotor control.  Pt. is likely to benefit from skilled interventions to address their impairments, & treatment to emphasize dry needling to decrease pain & improve function, manual techniques to decrease pain & improve joint/soft-tissue mobility, balance & gait training exercises to improve safety & functional capacity, & core & LE exercises to increase strength, endurance, & neuromotor control.    Standardized testing and measures administered today reveal that the patient has multiple impairments in body structures and functions, activity limitations, and participation restrictions.  The patient has 2 personal factors and comorbidities that may serve as barriers affecting the plan of care, including previous L2-4 fusion, chronicity of symptoms.   Skilled PT services are warranted in order to realize measurable change in the above outcome measures and achieve improvements in the patient's functional status and individual goals.      Plan:  OP PT Plan  Treatment/Interventions: Blood flow restriction therapy, Dry needling, Education/ Instruction, Electrical stimulation, Gait training, Manual therapy, Neuromuscular re-education, Self care/ home management, Therapeutic activities, Therapeutic exercises, Vasopneumatic device  PT Plan: Skilled PT  PT Frequency: 1 time(s) per week  Duration: 10 weeks  Onset Date: 24/24/23  Certification Period Start Date: 4/24/24  Certification Period  End Date: TBD  Number of Treatments Authorized: TBD  Rehab Potential: Good  Plan of Care Agreement: Patient      Current Problem:   1. Abnormal gait        2. Bilateral hip pain  Referral to Physical Therapy            Subjective    Kvng June is a 58 y.o. male who presents with c/o B (L > R) hip pain.  Patient states symptoms began ~1 year ago.  Pt reports he was fishing & turned & felt a pop in the R hip & had bruising/swelling afterwards.  However, the pt's L hip pain had been gradually getting worse even before then.  Pt also has a history of spinal stenosis with L2-4 fusion in 10/2022.      Pain Intensity:  0/10 currently at rest, (10 = worst imaginable pain), 7/10 at worst.    Description:  sharp/stabbing  Duration:   intermittent  Status:  progressively worsening     Reports symptoms are aggravated with walking > 100', standing longer than 15-20 sec at a time, and alleviated with sitting, lying down, naproxen.      Precautions:  STEADI Fall Risk Score (the score of > 4 indicates an increased risk of falling):  3      Objective   Observations:  pt was pleasant, cooperative, & A+O x3.  Visual inspection revealed B (L > R) trendelemberg with decreased stance time on the L LE.  Distal PMS was intact & S/S of infection noted.    Palpatory Exam:  pt. c/o their 'familiar' pain B (L > R) hip flexors, TFL, & prox quadriceps along the ASIS/AIIS, as well as the gluteals & hip ER mm with mod hypertonicity noted.      Hip ROM_______ ___R [deg]___ ___L [deg]___    Flex____________ ___100_____ ___110_____   ER in 90/90____ ___50_____ ___50_____   IR in 90/90_____ ___20_____ ___30_____   Ext_____________ ___10_____ ___10_____   * = painful       Hip MMT______ ___R______ ___L_____   Flex____________ ___4/5*____ ___4/5*___   ADD___________ ___5/5____ ___5/5___   ABD___________  ___4/5____ ___4/5___   Ext_____________ ___4/5*____ ___4/5*___   * = painful       Hip Tests_________ ___R_____ ___L_____     Scour____________ ___[-]_____ ___[-]_____   FADDIR___________ ___[-]_____ ___[-]_____   FABER____________ ___[-]_____ ___[-]_____   Figure 4__________ ___[+]_____ ___[+]_____   Shamika's____________ ___[+]_____ ___[+]_____   Thomas__________ ___[+]_____ ___[+]_____       Functional Tests:  - 5x sit to stand:  13.12 sec (rates pain as 4/10)  - TUG test:  7.93 sec (rates pain as 4/10)      Treatment today included:    PT Evaluation (24828):  14 minutes      Therapeutic Exercises (21308):  25 minutes    Access Code: [email or web address]  URL: [email or web address]  Date: 04/24/2024  Prepared by: Raudel Ordoñez    Exercises  - Supine Active Straight Leg Raise  - 5-7 x weekly - 2-3 sets - 10-15 reps  - Supine Bridge with Resistance Band  - 5-7 x weekly - 2-3 sets - 10-15 reps  - Clamshell with Resistance  - 5-7 x weekly - 2-3 sets - 10-15 reps  - Standing Marching  - 5-7 x weekly - 2-3 sets - 10-15 reps - 3-5 sec hold    * = added to HEP.  Sheet with exercise descriptions and images issued.  Skilled intervention utilized in the appropriate selection & application of above exercises.  Verbal and tactile cues provided for proper form and technique.  Pt. demonstrated appropriate form & verbalized understanding of optimal technique for above exercises.        Self Care / Home Management (28375):  15 minutes  The patient was educated on:  the importance of positioning, proper posture, and body mechanics, joint mechanics and pathology, general tissue healing time, the appropriate use of heat and cold to control pain and inflammation, the importance of general therapeutic exercise, especially to stay within pain-free ROM, specific anatomy, function, & regional interdependence of involved areas, & likely cause of impairments & POC.  Pt's questions were answered to their satisfaction, & pt. verbalized understanding & agreement with POC.      Outcome Measures:  Other Measures  Lower Extremity Functional Scale (LEFS): 23/80  or 28.75%       Goals:  Demonstrate independence with home exercise program  Increase B hip ROM to pain free + WNL  Increase B hip strength to pain free + WNL  Report decrease in pain by greater than or equal to 2 points to meet the MCID  Increase score of LEFS by greater than or equal to 9 points to meet the MCID  Perform ADLs without an increase symptoms  Ascend / descend stairs without an increase in symptoms  Increase tolerance to standing > 10 minutes without c/o increased pain  Ambulate x 0.5 miles without an increase in symptoms  Pt. will be able to sleep through the night without an increase in symptoms

## 2024-04-26 DIAGNOSIS — R97.20 ELEVATED PSA: Primary | ICD-10-CM

## 2024-04-26 NOTE — PROGRESS NOTES
Patient called and results reviewed. Referral to urology provided as patient has had multiple elevated PSA.

## 2024-04-30 ENCOUNTER — TELEPHONE (OUTPATIENT)
Dept: PRIMARY CARE | Facility: CLINIC | Age: 59
End: 2024-04-30

## 2024-04-30 ENCOUNTER — TREATMENT (OUTPATIENT)
Dept: PHYSICAL THERAPY | Facility: CLINIC | Age: 59
End: 2024-04-30
Payer: COMMERCIAL

## 2024-04-30 DIAGNOSIS — M25.551 BILATERAL HIP PAIN: ICD-10-CM

## 2024-04-30 DIAGNOSIS — M25.552 BILATERAL HIP PAIN: ICD-10-CM

## 2024-04-30 DIAGNOSIS — R26.9 ABNORMAL GAIT: Primary | ICD-10-CM

## 2024-04-30 PROCEDURE — 97535 SELF CARE MNGMENT TRAINING: CPT | Mod: GP

## 2024-04-30 PROCEDURE — 97110 THERAPEUTIC EXERCISES: CPT | Mod: GP

## 2024-04-30 NOTE — PROGRESS NOTES
Physical Therapy Treatment      Patient Name: Kvng June  MRN: [id number]  Today's Date: 4/30/2024  Time Calculation  Start Time: 1052  Stop Time: 1131  Time Calculation (min): 39 min    Insurance:  Visit number:  2 of 7  Auth 7 visits (4/24/24 - 6/28/24)        Assessment:  Pt appears to be making steady progress toward his goals. He tolerated the progressions with some of his exericses without c/o increased pain.  He still fatigues quickly & had 1 episode of what appeared to be internal snapping hip during L SLS, but his symptoms resolved afterwards without any lingering issues.  Anticipate pt. will see continued progress with regular HEP adherence, but they would still benefit from regular f/u's to monitor their progress & ensure appropriate progressions.       Plan:  OP PT Plan  Treatment/Interventions: Blood flow restriction therapy, Dry needling, Education/ Instruction, Electrical stimulation, Gait training, Manual therapy, Neuromuscular re-education, Self care/ home management, Therapeutic activities, Therapeutic exercises, Vasopneumatic device  PT Plan: Skilled PT  PT Frequency: 1 time(s) per week  Duration: 10 weeks  Onset Date: 24/24/23  Certification Period Start Date: 4/24/24  Certification Period End Date: TBD  Number of Treatments Authorized: TBD  Rehab Potential: Good  Plan of Care Agreement: Patient      Current Problem:   1. Abnormal gait        2. Bilateral hip pain              Subjective    Pt. presented with B (L > R) pain which was likely d/t chronic muscular strains resulting from previous lumbar stenosis & resultant L2-L4 fusion, & subsequently, altered body/joint mechanics, decreased mm. strength/endurance, & abnormal neuromotor control.       Pt reports he had some increased pain after his initial appt so he took a day off, but it returned to baseline within 24-48 hours.  States he's been doing his HEP without pain/problems, but notices weakness.       Objective   Observations:  pt. c/o  their 'familiar' pain B (L > R) hip flexors, TFL, & prox quadriceps along the ASIS/AIIS, as well as the gluteals & hip ER mm with mod hypertonicity noted.        Therapeutic Exercises (46110):  29 minutes  - NuStep (level 5) x 6:30 min  - Hook lying alt knee ext  - Supine Active Straight Leg Raise (each)  - Supine Bridge with Resistance Band   - Clamshell with Resistance   - Standing Marching   - Lateral side stepping over hurdles    * = added to HEP.  Sheet with exercise descriptions and images issued.  Skilled intervention utilized in the appropriate selection & application of above exercises.  Verbal and tactile cues provided for proper form and technique.  Pt. demonstrated appropriate form & verbalized understanding of optimal technique for above exercises.        Self Care / Home Management (89284):  10 minutes    Reviewed & updated current HEP:    Access Code: [email or web address]  URL: [email or web address]  Date: 04/24/2024  Prepared by: Raudel Ordoñez    Exercises  - Supine Active Straight Leg Raise  - 5-7 x weekly - 2-3 sets - 10-15 reps  - Supine Bridge with Resistance Band  - 5-7 x weekly - 2-3 sets - 10-15 reps  - Clamshell with Resistance  - 5-7 x weekly - 2-3 sets - 10-15 reps  - Standing Marching  - 5-7 x weekly - 2-3 sets - 10-15 reps - 3-5 sec hold    Large portion of pt. care time devoted to reviewing pt's current symptoms & determining optimal manual techniques with appropriate HEP modifications.  Discussed importance of regular HEP adherence, & stressed importance of proper form.  Pt. educated about differences between post-exercises soreness vs. increased 'familiar' pain, & reviewed appropriate progressions/regressions with exercises/activities based on symptoms.  Pt. verbalized understanding & agreement.

## 2024-05-09 DIAGNOSIS — I10 HYPERTENSION, ESSENTIAL: ICD-10-CM

## 2024-05-09 DIAGNOSIS — F41.9 ANXIETY DISORDER, UNSPECIFIED TYPE: ICD-10-CM

## 2024-05-09 DIAGNOSIS — F41.1 GENERALIZED ANXIETY DISORDER: ICD-10-CM

## 2024-05-10 ENCOUNTER — TELEPHONE (OUTPATIENT)
Dept: PRIMARY CARE | Facility: CLINIC | Age: 59
End: 2024-05-10
Payer: COMMERCIAL

## 2024-05-10 DIAGNOSIS — I10 HYPERTENSION, ESSENTIAL: ICD-10-CM

## 2024-05-10 DIAGNOSIS — F41.9 ANXIETY DISORDER, UNSPECIFIED TYPE: ICD-10-CM

## 2024-05-10 DIAGNOSIS — F41.1 GENERALIZED ANXIETY DISORDER: ICD-10-CM

## 2024-05-10 NOTE — TELEPHONE ENCOUNTER
Rx Refill Request Telephone Encounter    Name:  Kvng June  :  [id number]  Medication Name:      chlorthalidone (Hygroton) 25 mg tablet     buPROPion XL (Wellbutrin XL) 300 mg 24 hr tablet     buPROPion XL (Wellbutrin XL) 150 mg 24 hr tablet     Specific Pharmacy location:      [facility] [id number] [id number]  Phone: [phone]  Fax: [phone]     Date of last appointment:    Date of next appointment:    Best number to reach patient:    [phone]

## 2024-05-13 RX ORDER — BUPROPION HYDROCHLORIDE 150 MG/1
TABLET ORAL
Qty: 30 TABLET | Refills: 0 | Status: SHIPPED | OUTPATIENT
Start: 2024-05-13

## 2024-05-13 RX ORDER — CHLORTHALIDONE 25 MG/1
25 TABLET ORAL DAILY
Qty: 30 TABLET | Refills: 0 | Status: SHIPPED | OUTPATIENT
Start: 2024-05-13

## 2024-05-13 RX ORDER — CHLORTHALIDONE 25 MG/1
25 TABLET ORAL DAILY
Qty: 30 TABLET | Refills: 0 | OUTPATIENT
Start: 2024-05-13

## 2024-05-13 RX ORDER — BUPROPION HYDROCHLORIDE 300 MG/1
300 TABLET ORAL DAILY
Qty: 30 TABLET | Refills: 2 | OUTPATIENT
Start: 2024-05-13 | End: 2024-08-11

## 2024-05-13 RX ORDER — BUPROPION HYDROCHLORIDE 150 MG/1
TABLET ORAL
Qty: 30 TABLET | Refills: 2 | OUTPATIENT
Start: 2024-05-13

## 2024-05-13 RX ORDER — BUPROPION HYDROCHLORIDE 300 MG/1
300 TABLET ORAL DAILY
Qty: 30 TABLET | Refills: 0 | Status: SHIPPED | OUTPATIENT
Start: 2024-05-13

## 2024-05-14 DIAGNOSIS — K57.92 DIVERTICULITIS: Primary | ICD-10-CM

## 2024-05-14 RX ORDER — AMOXICILLIN AND CLAVULANATE POTASSIUM 875; 125 MG/1; MG/1
875 TABLET, FILM COATED ORAL 2 TIMES DAILY
Qty: 20 TABLET | Refills: 0 | Status: SHIPPED | OUTPATIENT
Start: 2024-05-14 | End: 2024-05-24

## 2024-05-14 NOTE — PROGRESS NOTES
Called patient to discuss his concern that he has diverticulitis. He has had this in the past and it responded well to treatment with Augmentin. I discussed with patient that we would recommend he be evaluated in person for this concern, however he is currently uninsured. Will send in a 10 day course of Augmentin 875 BID and emphasized the importance of immediate evaluation if symptoms such as pain, PO intolerance, blood in stool, dizziness, lightheadedness, etc worsen. Strict ED precautions were reviewed and patient was agreeable.

## 2024-06-19 DIAGNOSIS — F41.9 ANXIETY DISORDER, UNSPECIFIED TYPE: ICD-10-CM

## 2024-06-20 DIAGNOSIS — I10 HYPERTENSION, ESSENTIAL: ICD-10-CM

## 2024-06-20 DIAGNOSIS — F41.1 GENERALIZED ANXIETY DISORDER: ICD-10-CM

## 2024-06-21 ENCOUNTER — TELEPHONE (OUTPATIENT)
Dept: PRIMARY CARE | Facility: CLINIC | Age: 59
End: 2024-06-21
Payer: COMMERCIAL

## 2024-06-21 DIAGNOSIS — F41.1 GENERALIZED ANXIETY DISORDER: ICD-10-CM

## 2024-06-21 DIAGNOSIS — F41.9 ANXIETY DISORDER, UNSPECIFIED TYPE: ICD-10-CM

## 2024-06-21 NOTE — TELEPHONE ENCOUNTER
Rx Refill Request Telephone Encounter    Name:  Kvng June  :  [id number]  Medication Name:    buPROPion XL (Wellbutrin XL) 300 mg 24 hr tablet [[id number]]   buPROPion XL (Wellbutrin XL) 150 mg 24 hr tablet [[id number]]     chlorthalidone (Hygroton) 25 mg tablet [[id number]        Specific Pharmacy location:    [facility] [address] [place], [place] [address]  Phone: [phone]  Fax: [phone]  AVRIL #: --

## 2024-06-27 RX ORDER — BUPROPION HYDROCHLORIDE 300 MG/1
300 TABLET ORAL DAILY
Qty: 30 TABLET | Refills: 0 | Status: SHIPPED | OUTPATIENT
Start: 2024-06-27

## 2024-06-27 RX ORDER — BUPROPION HYDROCHLORIDE 150 MG/1
TABLET ORAL
Qty: 30 TABLET | Refills: 0 | Status: SHIPPED | OUTPATIENT
Start: 2024-06-27

## 2024-06-28 DIAGNOSIS — I10 HYPERTENSION, ESSENTIAL: ICD-10-CM

## 2024-06-28 RX ORDER — BUPROPION HYDROCHLORIDE 300 MG/1
300 TABLET ORAL DAILY
Qty: 30 TABLET | Refills: 0 | OUTPATIENT
Start: 2024-06-28

## 2024-06-28 RX ORDER — CHLORTHALIDONE 25 MG/1
25 TABLET ORAL DAILY
Qty: 30 TABLET | Refills: 0 | Status: SHIPPED | OUTPATIENT
Start: 2024-06-28

## 2024-06-28 RX ORDER — BUPROPION HYDROCHLORIDE 150 MG/1
TABLET ORAL
Qty: 30 TABLET | Refills: 0 | OUTPATIENT
Start: 2024-06-28

## 2024-06-28 RX ORDER — CHLORTHALIDONE 25 MG/1
25 TABLET ORAL DAILY
Qty: 30 TABLET | Refills: 0 | Status: SHIPPED | OUTPATIENT
Start: 2024-06-28 | End: 2024-06-28

## 2024-07-11 DIAGNOSIS — R10.32 LEFT LOWER QUADRANT ABDOMINAL PAIN: ICD-10-CM

## 2024-07-11 DIAGNOSIS — R10.13 EPIGASTRIC ABDOMINAL PAIN: ICD-10-CM

## 2024-07-11 RX ORDER — PANTOPRAZOLE SODIUM 40 MG/1
40 TABLET, DELAYED RELEASE ORAL DAILY
Qty: 30 TABLET | Refills: 0 | Status: SHIPPED | OUTPATIENT
Start: 2024-07-11

## 2024-07-21 DIAGNOSIS — I10 HYPERTENSION, ESSENTIAL: ICD-10-CM

## 2024-07-23 RX ORDER — LOSARTAN POTASSIUM 100 MG/1
100 TABLET ORAL DAILY
Qty: 90 TABLET | Refills: 0 | Status: SHIPPED | OUTPATIENT
Start: 2024-07-23 | End: 2025-07-23

## 2024-07-31 DIAGNOSIS — R10.13 EPIGASTRIC ABDOMINAL PAIN: ICD-10-CM

## 2024-07-31 DIAGNOSIS — R10.32 LEFT LOWER QUADRANT ABDOMINAL PAIN: ICD-10-CM

## 2024-07-31 RX ORDER — PANTOPRAZOLE SODIUM 40 MG/1
TABLET, DELAYED RELEASE ORAL
Qty: 30 TABLET | Refills: 0 | Status: SHIPPED | OUTPATIENT
Start: 2024-07-31

## 2024-08-05 ENCOUNTER — TELEPHONE (OUTPATIENT)
Dept: PRIMARY CARE | Facility: CLINIC | Age: 59
End: 2024-08-05
Payer: COMMERCIAL

## 2024-08-05 DIAGNOSIS — B00.9 HERPES SIMPLEX: ICD-10-CM

## 2024-08-05 NOTE — TELEPHONE ENCOUNTER
Pt is requesting a 60 day supply with additional refills if possible.    Name:  Kvng June  :  [id number]  Medication Name:  famciclovir (Famvir) 250 mg tablet   Specific Pharmacy location:  [facility] [id number] [place] [id number] [facility]   Date of last appointment:  24  Date of next appointment:  n/a  Best number to reach patient:  [phone]

## 2024-08-08 RX ORDER — FAMCICLOVIR 250 MG/1
250 TABLET ORAL 2 TIMES DAILY
Qty: 180 TABLET | Refills: 0 | Status: SHIPPED | OUTPATIENT
Start: 2024-08-08

## 2024-08-09 DIAGNOSIS — B00.9 HERPES SIMPLEX: ICD-10-CM

## 2024-08-09 RX ORDER — FAMCICLOVIR 250 MG/1
250 TABLET ORAL 2 TIMES DAILY
Qty: 180 TABLET | Refills: 0 | OUTPATIENT
Start: 2024-08-09

## 2024-08-15 ENCOUNTER — TELEPHONE (OUTPATIENT)
Dept: PRIMARY CARE | Facility: CLINIC | Age: 59
End: 2024-08-15
Payer: COMMERCIAL

## 2024-08-15 NOTE — TELEPHONE ENCOUNTER
PT needs refill for...   (Wife states this is the 2nd request)    famciclovir (Famvir) 250 mg tablet     Please send to [facility]    Thank you

## 2024-08-16 DIAGNOSIS — B00.9 HERPES SIMPLEX: ICD-10-CM

## 2024-08-16 RX ORDER — FAMCICLOVIR 250 MG/1
250 TABLET ORAL 2 TIMES DAILY
Qty: 180 TABLET | Refills: 0 | Status: SHIPPED | OUTPATIENT
Start: 2024-08-16

## 2024-08-27 DIAGNOSIS — I10 HYPERTENSION, ESSENTIAL: ICD-10-CM

## 2024-08-27 DIAGNOSIS — F41.9 ANXIETY DISORDER, UNSPECIFIED TYPE: ICD-10-CM

## 2024-08-27 DIAGNOSIS — F41.1 GENERALIZED ANXIETY DISORDER: ICD-10-CM

## 2024-08-27 RX ORDER — BUPROPION HYDROCHLORIDE 300 MG/1
TABLET ORAL
Qty: 30 TABLET | Refills: 0 | Status: SHIPPED | OUTPATIENT
Start: 2024-08-27

## 2024-08-27 RX ORDER — CHLORTHALIDONE 25 MG/1
25 TABLET ORAL DAILY
Qty: 30 TABLET | Refills: 0 | Status: SHIPPED | OUTPATIENT
Start: 2024-08-27

## 2024-08-27 RX ORDER — BUPROPION HYDROCHLORIDE 150 MG/1
TABLET ORAL
Qty: 30 TABLET | Refills: 0 | Status: SHIPPED | OUTPATIENT
Start: 2024-08-27

## 2024-09-04 ENCOUNTER — APPOINTMENT (OUTPATIENT)
Dept: CARDIOLOGY | Facility: CLINIC | Age: 59
End: 2024-09-04
Payer: COMMERCIAL

## 2024-09-12 DIAGNOSIS — R10.13 EPIGASTRIC ABDOMINAL PAIN: ICD-10-CM

## 2024-09-12 DIAGNOSIS — R10.32 LEFT LOWER QUADRANT ABDOMINAL PAIN: ICD-10-CM

## 2024-09-13 RX ORDER — PANTOPRAZOLE SODIUM 40 MG/1
TABLET, DELAYED RELEASE ORAL
Qty: 30 TABLET | Refills: 0 | Status: SHIPPED | OUTPATIENT
Start: 2024-09-13

## 2024-10-03 ENCOUNTER — APPOINTMENT (OUTPATIENT)
Dept: RADIOLOGY | Facility: HOSPITAL | Age: 59
End: 2024-10-03
Payer: COMMERCIAL

## 2024-10-03 ENCOUNTER — HOSPITAL ENCOUNTER (EMERGENCY)
Facility: HOSPITAL | Age: 59
Discharge: HOME | End: 2024-10-03
Payer: COMMERCIAL

## 2024-10-03 VITALS
BODY MASS INDEX: 29.62 KG/M2 | HEART RATE: 73 BPM | HEIGHT: 69 IN | RESPIRATION RATE: 18 BRPM | TEMPERATURE: 97.9 F | WEIGHT: 200 LBS | SYSTOLIC BLOOD PRESSURE: 139 MMHG | OXYGEN SATURATION: 97 % | DIASTOLIC BLOOD PRESSURE: 97 MMHG

## 2024-10-03 DIAGNOSIS — S49.92XA ARM INJURY, LEFT, INITIAL ENCOUNTER: Primary | ICD-10-CM

## 2024-10-03 PROCEDURE — 73030 X-RAY EXAM OF SHOULDER: CPT | Mod: LT,FY

## 2024-10-03 PROCEDURE — 99284 EMERGENCY DEPT VISIT MOD MDM: CPT

## 2024-10-03 PROCEDURE — 73060 X-RAY EXAM OF HUMERUS: CPT | Mod: LEFT SIDE | Performed by: RADIOLOGY

## 2024-10-03 PROCEDURE — 2500000001 HC RX 250 WO HCPCS SELF ADMINISTERED DRUGS (ALT 637 FOR MEDICARE OP)

## 2024-10-03 PROCEDURE — 73030 X-RAY EXAM OF SHOULDER: CPT | Mod: LEFT SIDE | Performed by: RADIOLOGY

## 2024-10-03 PROCEDURE — 73060 X-RAY EXAM OF HUMERUS: CPT | Mod: LT,FY

## 2024-10-03 RX ORDER — ACETAMINOPHEN 325 MG/1
975 TABLET ORAL ONCE
Status: COMPLETED | OUTPATIENT
Start: 2024-10-03 | End: 2024-10-03

## 2024-10-03 RX ADMIN — ACETAMINOPHEN 325MG 975 MG: 325 TABLET ORAL at 12:34

## 2024-10-03 ASSESSMENT — PAIN DESCRIPTION - LOCATION: LOCATION: ARM

## 2024-10-03 ASSESSMENT — LIFESTYLE VARIABLES
EVER HAD A DRINK FIRST THING IN THE MORNING TO STEADY YOUR NERVES TO GET RID OF A HANGOVER: NO
TOTAL SCORE: 0
HAVE PEOPLE ANNOYED YOU BY CRITICIZING YOUR DRINKING: NO
HAVE YOU EVER FELT YOU SHOULD CUT DOWN ON YOUR DRINKING: NO
EVER FELT BAD OR GUILTY ABOUT YOUR DRINKING: NO

## 2024-10-03 ASSESSMENT — PAIN - FUNCTIONAL ASSESSMENT: PAIN_FUNCTIONAL_ASSESSMENT: 0-10

## 2024-10-03 ASSESSMENT — COLUMBIA-SUICIDE SEVERITY RATING SCALE - C-SSRS
6. HAVE YOU EVER DONE ANYTHING, STARTED TO DO ANYTHING, OR PREPARED TO DO ANYTHING TO END YOUR LIFE?: NO
2. HAVE YOU ACTUALLY HAD ANY THOUGHTS OF KILLING YOURSELF?: NO
1. IN THE PAST MONTH, HAVE YOU WISHED YOU WERE DEAD OR WISHED YOU COULD GO TO SLEEP AND NOT WAKE UP?: NO

## 2024-10-03 ASSESSMENT — PAIN DESCRIPTION - ORIENTATION: ORIENTATION: LEFT;UPPER

## 2024-10-03 ASSESSMENT — PAIN SCALES - GENERAL
PAINLEVEL_OUTOF10: 0 - NO PAIN
PAINLEVEL_OUTOF10: 8

## 2024-10-03 NOTE — Clinical Note
Kvng Slade was seen and treated in our emergency department on 10/3/2024.  He may return to work on 10/10/2024.       If you have any questions or concerns, please don't hesitate to call.      Graciela Malik PA-C

## 2024-10-03 NOTE — DISCHARGE INSTRUCTIONS
The x-rays were obtained did not show any bony abnormalities of your left shoulder or humerus bones.  It is likely you injured your rotator cuff or have a partial tear of your biceps tendon.  Please be sure to rest your arm.  Icing the area 20 minutes on followed by 20 minutes off a few times per day should help any kind of pain and swelling.  You can use your arthritis medication as needed for pain.  I would like you to follow-up with our orthopedics clinic.  I placed an internal referral to their team.  If not hear from them, their phone number is [phone].  You could also present to the one of their local walk-in clinics for further evaluation and management of your symptoms.

## 2024-10-03 NOTE — ED PROVIDER NOTES
HPI   Chief Complaint   Patient presents with    Arm Injury     LUE pain after moving box at work this morning       Patient is a 59-year-old male presenting to the ED with an arm injury.  Patient states he works for [employer] and was lifting a heavy box this morning when he felt a pop in his left shoulder.  Patient endorses pain in the left shoulder and left bicep since that incident.  He states the pain is worsened with movement and he rates it a 8/10.  Patient states he takes both naproxen as well as CBD Gummies for his arthritis, which he took this morning.  He denies any previous injury, trauma, or surgery to the LUE.  Patient states he is right-hand dominant.  He otherwise voices no other concerns/symptoms.              Patient History   Past Medical History:   Diagnosis Date    Arthritis     Depression     Hypertension     Personal history of other diseases of the circulatory system 2022    History of hypertension     Past Surgical History:   Procedure Laterality Date    BACK SURGERY N/A     OTHER SURGICAL HISTORY  2022    Appendectomy    US GUIDED NEEDLE LIVER BIOPSY  2020    US GUIDED NEEDLE LIVER BIOPSY 2020 [facility]     Family History   Problem Relation Name Age of Onset    Hypertension Mother       Social History     Tobacco Use    Smoking status: Former     Current packs/day: 0.00     Types: Cigarettes     Quit date:      Years since quittin.7    Smokeless tobacco: Never   Vaping Use    Vaping status: Former   Substance Use Topics    Alcohol use: Yes     Alcohol/week: 10.0 standard drinks of alcohol     Types: 10 Shots of liquor per week    Drug use: Never       Physical Exam   ED Triage Vitals [10/03/24 1156]   Temperature Heart Rate Respirations BP   36.6 °C (97.9 °F) 82 18 148/87      Pulse Ox Temp Source Heart Rate Source Patient Position   94 % Temporal -- --      BP Location FiO2 (%)     -- --       Physical Exam  Vitals reviewed.   Constitutional:        General: He is not in acute distress.     Appearance: He is not ill-appearing.   Cardiovascular:      Rate and Rhythm: Normal rate.   Pulmonary:      Effort: Pulmonary effort is normal. No respiratory distress.   Musculoskeletal:      Comments: Mild edema and TTP of the left biceps.  Patient endorses pain with ROM of the left shoulder.  No audible popping/crepitus.  Neurovascularly intact.   Skin:     General: Skin is warm and dry.      Comments: No evidence of injury/trauma including erythema or ecchymosis.   Neurological:      General: No focal deficit present.      Mental Status: He is alert.           ED Course & MDM   Diagnoses as of 10/03/24 1527   Arm injury, left, initial encounter     XR shoulder left 2+ views   Final Result   1. No evidence of acute fracture or dislocation.        MACRO:   None.        Signed by: Timothy Lazaro 10/3/2024 1:35 PM   Dictation workstation:   [id number]      XR humerus left   Final Result   1. No evidence of acute fracture or dislocation.        MACRO:   None.        Signed by: Timothy Lazaro 10/3/2024 1:35 PM   Dictation workstation:   [id number]                  No data recorded     [place] Coma Scale Score: 15 (10/03/24 1159 : Nyasia Baez RN)                           Medical Decision Making  Patient is a 59-year-old male presenting to the ED with an arm injury.  History was obtained from the patient.  Works for [employer] and was lifting heavy boxes morning when he felt a pop in his left shoulder.  Denies previous injury or surgery to the LUE.  He is right-hand dominant.  On physical exam, patient is sitting comfortably in no apparent distress.  There is mild edema and TTP of the left biceps.  Patient endorses pain with ROM of the left shoulder.  No audible/palpable popping or crepitus.  Neurovascularly intact.  Skin without evidence of injury or trauma including erythema or ecchymosis.  Remainder of exam as noted above.  Vital signs are stable.    Differential diagnosis  considered includes sprain versus tendinopathy.  Will get baseline x-rays of the left shoulder and humerus, although bony injury very unlikely.  Patient given a dose of Tylenol for his pain.  X-ray without evidence of acute abnormalities including fractures or dislocations.  These findings were discussed with the patient.  He does remain stable and ready for discharge.  It is very likely he has a shoulder sprain and/or shoulder/biceps tendinopathy at this time.  He can take his home doses of analgesics as needed for pain.  Emphasized the importance of icing the area daily as well as continuing to perform ROM as tolerated.  Placed an internal referral to the orthopedics team to facilitate outpatient follow-up.  Patient also provided the phone number for their clinic.  I also told him about their walk-in injury clinic, for which he was very interested and stated he will likely go there within the coming days if his symptoms do not improve.  Patient is agreeable to this plan and all of his questions were answered to satisfaction.  He was discharged from the ED in stable condition.        Procedure  Procedures     Graciela Malik PA-C  10/03/24 1534

## 2024-10-04 ENCOUNTER — OFFICE VISIT (OUTPATIENT)
Dept: ORTHOPEDIC SURGERY | Facility: CLINIC | Age: 59
End: 2024-10-04
Payer: COMMERCIAL

## 2024-10-04 DIAGNOSIS — S43.422A SPRAIN OF LEFT ROTATOR CUFF CAPSULE, INITIAL ENCOUNTER: ICD-10-CM

## 2024-10-04 DIAGNOSIS — S46.102A INJURY OF TENDON OF LONG HEAD OF LEFT BICEPS, INITIAL ENCOUNTER: Primary | ICD-10-CM

## 2024-10-04 DIAGNOSIS — S49.92XA ARM INJURY, LEFT, INITIAL ENCOUNTER: ICD-10-CM

## 2024-10-04 PROCEDURE — 99213 OFFICE O/P EST LOW 20 MIN: CPT | Performed by: ORTHOPAEDIC SURGERY

## 2024-10-04 RX ORDER — MELOXICAM 15 MG/1
15 TABLET ORAL DAILY
Qty: 30 TABLET | Refills: 0 | Status: SHIPPED | OUTPATIENT
Start: 2024-10-04 | End: 2024-11-03

## 2024-10-04 NOTE — PROGRESS NOTES
History of Present Illness   Chief Complaint   Patient presents with    Right Shoulder - New Patient Visit     [facility]       The patient is here with a complaint of side: left shoulder and arm pain.  The patient is side: right hand dominant.  They have had  1 day  of arm and shoulder pain.  The pain began work related injury on October 3, 2024 .  He was lifting a bin of boxes at work when he felt a pop and had pain in his left arm and shoulder.  He was seen in the emergency department, treated and released    Past Medical History:   Diagnosis Date    Arthritis     Depression     Hypertension     Personal history of other diseases of the circulatory system 2022    History of hypertension       Medication Documentation Review Audit       Reviewed by Pam Alejo MA (Medical Assistant) on 10/04/24 at 1119      Medication Order Taking? Sig Documenting Provider Last Dose Status   acetaminophen (Tylenol) tablet 975 mg [id number]   Graciela Malik PA-C   10/03/24 1234   buPROPion XL (Wellbutrin XL) 150 mg 24 hr tablet [id number]  TAKE 1 TABLET BY MOUTH ONCE DAILY IN THE MORNING. DO NOT CRUSH, CHEW, OR SPLIT TABLET Stanley DEISY Karena, DO  Active   buPROPion XL (Wellbutrin XL) 300 mg 24 hr tablet [id number]  TAKE ONE TABLET (300 MG) BY MOUTH ONCE DAILY Stanley DEISY Thurston, DO  Active   chlorthalidone (Hygroton) 25 mg tablet [id number]  TAKE ONE TABLET BY MOUTH DAILY Stanley DEISY Thurston, DO  Active   famciclovir (Famvir) 250 mg tablet [id number]  Take 1 tablet (250 mg) by mouth 2 times a day. Stanley Thurston, DO  Active   fexofenadine (Allegra) 180 mg tablet [id number] No Take 1 tablet (180 mg) by mouth once daily. Historical Provider, MD Taking Active   flaxseed oiL 1,000 mg capsule [id number] No Take 1 capsule (1,000 mg) by mouth 2 times a day. Historical Provider, MD Taking Active   flecainide (Tambocor) 100 mg tablet [id number] No Take 1 tablet (100 mg) by mouth twice a day. Historical Provider, MD Taking Active  "  glucosamine-chondroitin 500-400 mg tablet [id number] No Take 1 tablet by mouth 3 times a day. Historical Provider, MD Taking Active   hyoscyamine (Anaspaz, Levsin) 0.125 mg tablet [id number] No Take 1 tablet (0.125 mg) by mouth every 6 hours if needed for cramping. Keyur Bowden DO Taking Active   losartan (Cozaar) 100 mg tablet [id number]  TAKE 1 TABLET (100 MG) BY MOUTH ONCE DAILY Rico Spencer DO  Active   metoprolol tartrate (Lopressor) 25 mg tablet [id number] No Take 1 tablet (25 mg) by mouth 2 times a day. Historical Provider, MD Taking Active   multivitamin tablet [id number] No Take 1 tablet by mouth once daily. Historical Provider, MD Taking Active   ondansetron (Zofran) 4 mg tablet [id number] No Take 1 tablet (4 mg) by mouth every 8 hours if needed for nausea or vomiting. Keyur Bowden DO Taking Active   pantoprazole (ProtoNix) 40 mg EC tablet [id number]  TAKE ONE TABLET BY MOUTH DAILY - do not crush, chew or split Stanley A Thurston, DO  Active   polyethylene glycol (Miralax) 17 gram packet [id number]  Take 17 g by mouth if needed (as needed for constipation). Marie Alegria APRN-CNP  Active   sildenafil (Viagra) 100 mg tablet [id number] No TAKE 1 TABLET BY MOUTH DAILY 1 HOUR BEFORE NEEDED Rico Spencer DO Taking Active   turmeric-turmeric root extract 450-50 mg capsule [id number] No Take by mouth. Historical Provider, MD Taking Active   wheat dextrin (Benefiber Sugar Free, dextrin,) 3 gram/4 gram powder [id number] No Take 1 teaspoon daily with a full glass of water (8 ounces) Marie Duffple, APRN-CNP Taking Active   wheat dextrin (Benefiber Sugar Free, dextrin,) 3 gram/4 gram powder [id number]  Take 1 teaspoon daily with a full glass of water (8 ounces) Marie Alegria, APRN-CNP  Active                    Allergies   Allergen Reactions    Amlodipine Other     stomach cramps    Venlafaxine Other     Induces Irregular heart beat. \"Makes me mean\"    Induces Irregular heart beat       Social History     Socioeconomic History "    Marital status:      Spouse name: Not on file    Number of children: Not on file    Years of education: Not on file    Highest education level: Not on file   Occupational History    Not on file   Tobacco Use    Smoking status: Former     Current packs/day: 0.00     Types: Cigarettes     Quit date:      Years since quittin.7    Smokeless tobacco: Never   Vaping Use    Vaping status: Former   Substance and Sexual Activity    Alcohol use: Yes     Alcohol/week: 10.0 standard drinks of alcohol     Types: 10 Shots of liquor per week    Drug use: Never    Sexual activity: Defer   Other Topics Concern    Not on file   Social History Narrative    Not on file     Social Determinants of Health     Financial Resource Strain: Low Risk  (2024)    Overall Financial Resource Strain (CARDIA)     Difficulty of Paying Living Expenses: Not hard at all   Food Insecurity: No Food Insecurity (2024)    Hunger Vital Sign     Worried About Running Out of Food in the Last Year: Never true     Ran Out of Food in the Last Year: Never true   Transportation Needs: No Transportation Needs (2024)    PRAPARE - Transportation     Lack of Transportation (Medical): No     Lack of Transportation (Non-Medical): No   Physical Activity: Inactive (2024)    Exercise Vital Sign     Days of Exercise per Week: 0 days     Minutes of Exercise per Session: 0 min   Stress: No Stress Concern Present (2024)    [place] [employer] - Occupational Stress Questionnaire     Feeling of Stress : Not at all   Social Connections: Moderately Isolated (2024)    Social Connection and Isolation Panel [NHANES]     Frequency of Communication with Friends and Family: More than three times a week     Frequency of Social Gatherings with Friends and Family: Three times a week     Attends [employer] Services: Never     Active Member of Clubs or Organizations: No     Attends Club or Organization Meetings: Never      Marital Status:    Intimate Partner Violence: Not At Risk (2/28/2024)    Humiliation, Afraid, Rape, and Kick questionnaire     Fear of Current or Ex-Partner: No     Emotionally Abused: No     Physically Abused: No     Sexually Abused: No   Housing Stability: Unknown (2/28/2024)    Housing Stability Vital Sign     Unable to Pay for Housing in the Last Year: No     Number of Places Lived in the Last Year: Not on file     Unstable Housing in the Last Year: No       Past Surgical History:   Procedure Laterality Date    BACK SURGERY N/A     OTHER SURGICAL HISTORY  03/30/2022    Appendectomy    US GUIDED NEEDLE LIVER BIOPSY  05/18/2020    US GUIDED NEEDLE LIVER BIOPSY 5/18/2020 [facility]         Location:  Anterior arm and superior shoulder  Pain level: 5  Description:  Sharp  Pain with: reaching and lifting       Review of Systems   GENERAL: Negative  GI: Negative  MUSCULOSKELETAL: See HPI  SKIN: Negative  NEURO:  Negative     Physical Exam:    This is a male in no acute distress    side: left Shoulder:  There is  no  tenderness to palpation over the acromioclavicular joint.  Active range of motion: Forward elevation 170, internal rotation to L4, external rotation is 40 degrees  Jobes test positive for pain, negative for weakness  External rotation test negative  Belly press test positive for pain, negative for weakness  Conyngham's test positive pain along the biceps  There is a Karl muscle deformity about the left arm.  There is some early ecchymosis.  There is tenderness to palpation.  Elbow and wrist motion were not irritable.  The distal biceps is intact.  Radial pulse 2+ and palpable.     Imaging  XR shoulder left 2+ views, XR humerus left  Narrative: Interpreted By:  Timothy Lazaro,   STUDY:  XR HUMERUS LEFT; XR SHOULDER LEFT 2+ VIEWS 10/3/2024 1:20 pm      INDICATION:  Signs/Symptoms:injured picking up box      COMPARISON:  None.      ACCESSION NUMBER(S):  [id number]; [id number]      ORDERING  CLINICIAN:  RELL MULTANI      TECHNIQUE:  4 views of the left shoulder including AP , Grashey and scapular  Y-views and two views of the left humerus including AP and lateral  projections were obtained.      FINDINGS:  There is no radiographic evidence of acute fracture or dislocation  identified.  The joint spaces are well preserved without significant  degenerative changes.      Impression: 1. No evidence of acute fracture or dislocation.      MACRO:  None.      Signed by: Timothy Lazaro 10/3/2024 1:35 PM  Dictation workstation:   [id number]         Assessment   side: left Shoulder pain  Long head of the biceps rupture  Shoulder sprain    Plan  Rest, ice, elevation, NSAIDS, and work restrictions  Mobic was sent to the pharmacy  No use of the left arm for 5 weeks  Follow-up in 1 month for recheck, sooner if there is any problems  All questions were answered

## 2024-10-04 NOTE — LETTER
October 4, 2024     Patient: Kvng June   YOB: 1965   Date of Visit: 10/4/2024       To Whom it May Concern:    Kvng June was seen in my clinic on 10/4/2024. He may return to work with restrictions including no use of the left arm for 5 weeks pending follow up visit in 4 weeks.     If you have any questions or concerns, please don't hesitate to call.         Sincerely,      Marlo Taylor MD  Electronically signed       CC: No Recipients

## 2024-10-08 ENCOUNTER — TELEPHONE (OUTPATIENT)
Dept: PRIMARY CARE | Facility: CLINIC | Age: 59
End: 2024-10-08
Payer: COMMERCIAL

## 2024-10-08 DIAGNOSIS — F41.1 GENERALIZED ANXIETY DISORDER: ICD-10-CM

## 2024-10-08 DIAGNOSIS — R10.32 LEFT LOWER QUADRANT ABDOMINAL PAIN: ICD-10-CM

## 2024-10-08 DIAGNOSIS — I10 HYPERTENSION, ESSENTIAL: ICD-10-CM

## 2024-10-08 DIAGNOSIS — R10.13 EPIGASTRIC ABDOMINAL PAIN: ICD-10-CM

## 2024-10-08 DIAGNOSIS — F41.9 ANXIETY DISORDER, UNSPECIFIED TYPE: ICD-10-CM

## 2024-10-08 RX ORDER — BUPROPION HYDROCHLORIDE 150 MG/1
TABLET ORAL
Qty: 20 TABLET | Refills: 0 | Status: SHIPPED | OUTPATIENT
Start: 2024-10-08

## 2024-10-08 RX ORDER — METOPROLOL TARTRATE 25 MG/1
25 TABLET, FILM COATED ORAL 2 TIMES DAILY
Qty: 40 TABLET | Refills: 0 | Status: SHIPPED | OUTPATIENT
Start: 2024-10-08 | End: 2024-10-28

## 2024-10-08 RX ORDER — BUPROPION HYDROCHLORIDE 300 MG/1
300 TABLET ORAL EVERY MORNING
Qty: 30 TABLET | Refills: 0 | Status: SHIPPED | OUTPATIENT
Start: 2024-10-08 | End: 2024-10-28

## 2024-10-08 RX ORDER — CHLORTHALIDONE 25 MG/1
25 TABLET ORAL DAILY
Qty: 30 TABLET | Refills: 0 | Status: SHIPPED | OUTPATIENT
Start: 2024-10-08 | End: 2024-10-28

## 2024-10-08 RX ORDER — PANTOPRAZOLE SODIUM 40 MG/1
40 TABLET, DELAYED RELEASE ORAL
Qty: 20 TABLET | Refills: 0 | Status: SHIPPED | OUTPATIENT
Start: 2024-10-08

## 2024-10-08 NOTE — TELEPHONE ENCOUNTER
Rx Refill Request Telephone Encounter    Name:  Kvng June  :  [id number]  Medication Name:  buPROPion XL and buPROPion XL (Wellbutrin XL) 300, metoprolol tartrate,   chlorthalidone (Hygroton)   pantoprazole (ProtoNix)             Specific Pharmacy location:  [facility] [phone]  Date of last appointment:  10/3/2024  Date of next appointment:    Best number to reach patient:

## 2024-10-14 ENCOUNTER — APPOINTMENT (OUTPATIENT)
Dept: PRIMARY CARE | Facility: CLINIC | Age: 59
End: 2024-10-14
Payer: COMMERCIAL

## 2024-10-14 ENCOUNTER — LAB (OUTPATIENT)
Dept: LAB | Facility: LAB | Age: 59
End: 2024-10-14

## 2024-10-14 VITALS
BODY MASS INDEX: 31.31 KG/M2 | RESPIRATION RATE: 18 BRPM | SYSTOLIC BLOOD PRESSURE: 133 MMHG | HEART RATE: 73 BPM | DIASTOLIC BLOOD PRESSURE: 92 MMHG | OXYGEN SATURATION: 98 % | TEMPERATURE: 98 F | WEIGHT: 212 LBS

## 2024-10-14 DIAGNOSIS — Z12.2 SCREENING FOR LUNG CANCER: ICD-10-CM

## 2024-10-14 DIAGNOSIS — I10 HYPERTENSION, ESSENTIAL: ICD-10-CM

## 2024-10-14 DIAGNOSIS — F41.9 ANXIETY DISORDER, UNSPECIFIED TYPE: ICD-10-CM

## 2024-10-14 DIAGNOSIS — Z12.5 PROSTATE CANCER SCREENING: ICD-10-CM

## 2024-10-14 DIAGNOSIS — Z00.00 ADULT WELLNESS VISIT: ICD-10-CM

## 2024-10-14 DIAGNOSIS — Z87.891 PERSONAL HISTORY OF NICOTINE DEPENDENCE: Primary | ICD-10-CM

## 2024-10-14 DIAGNOSIS — I48.91 ATRIAL FIBRILLATION, UNSPECIFIED TYPE (MULTI): ICD-10-CM

## 2024-10-14 DIAGNOSIS — F41.1 GENERALIZED ANXIETY DISORDER: ICD-10-CM

## 2024-10-14 DIAGNOSIS — R10.13 EPIGASTRIC ABDOMINAL PAIN: ICD-10-CM

## 2024-10-14 DIAGNOSIS — Z13.6 ENCOUNTER FOR ABDOMINAL AORTIC ANEURYSM (AAA) SCREENING: ICD-10-CM

## 2024-10-14 DIAGNOSIS — B35.1 ONYCHOMYCOSIS: ICD-10-CM

## 2024-10-14 LAB
ALBUMIN SERPL BCP-MCNC: 4.5 G/DL (ref 3.4–5)
ALP SERPL-CCNC: 74 U/L (ref 33–136)
ALT SERPL W P-5'-P-CCNC: 31 U/L (ref 10–52)
ANION GAP SERPL CALC-SCNC: 10 MMOL/L (ref 10–20)
AST SERPL W P-5'-P-CCNC: 27 U/L (ref 9–39)
BILIRUB SERPL-MCNC: 0.4 MG/DL (ref 0–1.2)
BUN SERPL-MCNC: 21 MG/DL (ref 6–23)
CALCIUM SERPL-MCNC: 9.8 MG/DL (ref 8.6–10.3)
CHLORIDE SERPL-SCNC: 101 MMOL/L (ref 98–107)
CO2 SERPL-SCNC: 31 MMOL/L (ref 21–32)
CREAT SERPL-MCNC: 1.13 MG/DL (ref 0.5–1.3)
EGFRCR SERPLBLD CKD-EPI 2021: 74 ML/MIN/1.73M*2
ERYTHROCYTE [DISTWIDTH] IN BLOOD BY AUTOMATED COUNT: 12.7 % (ref 11.5–14.5)
GLUCOSE SERPL-MCNC: 111 MG/DL (ref 74–99)
HCT VFR BLD AUTO: 41.3 % (ref 41–52)
HGB BLD-MCNC: 13.8 G/DL (ref 13.5–17.5)
MCH RBC QN AUTO: 35.6 PG (ref 26–34)
MCHC RBC AUTO-ENTMCNC: 33.4 G/DL (ref 32–36)
MCV RBC AUTO: 106 FL (ref 80–100)
NRBC BLD-RTO: 0 /100 WBCS (ref 0–0)
PLATELET # BLD AUTO: 172 X10*3/UL (ref 150–450)
POTASSIUM SERPL-SCNC: 4.1 MMOL/L (ref 3.5–5.3)
PROT SERPL-MCNC: 7.1 G/DL (ref 6.4–8.2)
PSA SERPL-MCNC: 2.65 NG/ML
RBC # BLD AUTO: 3.88 X10*6/UL (ref 4.5–5.9)
SODIUM SERPL-SCNC: 138 MMOL/L (ref 136–145)
WBC # BLD AUTO: 6.9 X10*3/UL (ref 4.4–11.3)

## 2024-10-14 PROCEDURE — 3080F DIAST BP >= 90 MM HG: CPT

## 2024-10-14 PROCEDURE — 99396 PREV VISIT EST AGE 40-64: CPT

## 2024-10-14 PROCEDURE — G0103 PSA SCREENING: HCPCS

## 2024-10-14 PROCEDURE — 36415 COLL VENOUS BLD VENIPUNCTURE: CPT

## 2024-10-14 PROCEDURE — 3075F SYST BP GE 130 - 139MM HG: CPT

## 2024-10-14 PROCEDURE — 80053 COMPREHEN METABOLIC PANEL: CPT

## 2024-10-14 PROCEDURE — 99214 OFFICE O/P EST MOD 30 MIN: CPT

## 2024-10-14 PROCEDURE — 85027 COMPLETE CBC AUTOMATED: CPT

## 2024-10-14 PROCEDURE — 1036F TOBACCO NON-USER: CPT

## 2024-10-14 RX ORDER — LOSARTAN POTASSIUM 100 MG/1
100 TABLET ORAL DAILY
Qty: 90 TABLET | Refills: 0 | Status: SHIPPED | OUTPATIENT
Start: 2024-10-14 | End: 2025-10-14

## 2024-10-14 RX ORDER — BUPROPION HYDROCHLORIDE 150 MG/1
TABLET ORAL
Qty: 90 TABLET | Refills: 0 | Status: SHIPPED | OUTPATIENT
Start: 2024-10-14

## 2024-10-14 RX ORDER — CICLOPIROX 80 MG/ML
1 SOLUTION TOPICAL NIGHTLY
Qty: 6.6 ML | Refills: 3 | Status: SHIPPED | OUTPATIENT
Start: 2024-10-14

## 2024-10-14 RX ORDER — CHLORTHALIDONE 25 MG/1
25 TABLET ORAL DAILY
Qty: 90 TABLET | Refills: 0 | Status: SHIPPED | OUTPATIENT
Start: 2024-10-14 | End: 2025-01-12

## 2024-10-14 RX ORDER — PANTOPRAZOLE SODIUM 40 MG/1
40 TABLET, DELAYED RELEASE ORAL
Qty: 90 TABLET | Refills: 0 | Status: SHIPPED | OUTPATIENT
Start: 2024-10-14 | End: 2025-01-12

## 2024-10-14 RX ORDER — BUPROPION HYDROCHLORIDE 300 MG/1
300 TABLET ORAL EVERY MORNING
Qty: 90 TABLET | Refills: 0 | Status: SHIPPED | OUTPATIENT
Start: 2024-10-14 | End: 2025-01-12

## 2024-10-14 RX ORDER — METOPROLOL TARTRATE 25 MG/1
25 TABLET, FILM COATED ORAL 2 TIMES DAILY
Qty: 180 TABLET | Refills: 0 | Status: SHIPPED | OUTPATIENT
Start: 2024-10-14 | End: 2025-01-12

## 2024-10-14 ASSESSMENT — ENCOUNTER SYMPTOMS
LIGHT-HEADEDNESS: 0
DIZZINESS: 0
VOMITING: 0
NAUSEA: 0
FEVER: 0
SHORTNESS OF BREATH: 0

## 2024-10-14 NOTE — ASSESSMENT & PLAN NOTE
Blood pressure in office was 133/92.  This is better than it has been in the past.  Encourage patient to start checking his blood pressures at home he is he has not been doing this consistently.  He agreed that he will start keeping a blood pressure log.  I have refilled his medications chlorthalidone, losartan, metoprolol.  I have also ordered basic labs today to monitor kidney function electrolytes.  Orders:    chlorthalidone (Hygroton) 25 mg tablet; Take 1 tablet (25 mg) by mouth once daily.    losartan (Cozaar) 100 mg tablet; Take 1 tablet (100 mg) by mouth once daily.    metoprolol tartrate (Lopressor) 25 mg tablet; Take 1 tablet (25 mg) by mouth 2 times a day.

## 2024-10-14 NOTE — PROGRESS NOTES
I reviewed the resident/fellow's documentation and discussed the patient with the resident. I agree with the resident medical decision making as documented in the note.   Patient will get his screening testing done.  He is aware of the need for this and why.  Patient will get his blood work he will follow-up in 1 month  If any chest pain shortness of breath any nausea vomiting fever headache any concerning symptoms go to ER  Agree with assessment and plan  Carlitos Dailey, DO

## 2024-10-14 NOTE — ASSESSMENT & PLAN NOTE
Orders:    buPROPion XL (Wellbutrin XL) 150 mg 24 hr tablet; TAKE 1 TABLET BY MOUTH ONCE DAILY IN THE MORNING. DO NOT CRUSH, CHEW, OR SPLIT TABLET    buPROPion XL (Wellbutrin XL) 300 mg 24 hr tablet; Take 1 tablet (300 mg) by mouth once daily in the morning. Do not crush, chew, or split.

## 2024-10-14 NOTE — PROGRESS NOTES
Subjective   Kvng June is a 59 y.o. male who is here for a routine exam.    Patient here for annual wellness visit.  He also needs refills for several doses of medications.  He is acute concern for toenail fungus infection on several of his toes and says that his big toenails fell off both of his big toes in the last several months.  He has tried topical treatment in the past but has not had full resolution.  He is up-to-date on colonoscopies.  He is a former smoker that quit 5 years ago and had about 20-pack-year history.  He has not had low-dose CT scan or AAA screening performed.  He did have elevated PSAs last year and never saw urology.     Comprehensive Medical/Surgical/Social/Family History  Past Medical History:   Diagnosis Date    Arthritis     Depression     Hypertension     Personal history of other diseases of the circulatory system 03/30/2022    History of hypertension     Past Surgical History:   Procedure Laterality Date    BACK SURGERY N/A     OTHER SURGICAL HISTORY  03/30/2022    Appendectomy    US GUIDED NEEDLE LIVER BIOPSY  05/18/2020    US GUIDED NEEDLE LIVER BIOPSY 5/18/2020 [facility]     Social History     Social History Narrative    Not on file         Allergies and Medications  Amlodipine and Venlafaxine  Current Outpatient Medications on File Prior to Visit   Medication Sig Dispense Refill    famciclovir (Famvir) 250 mg tablet Take 1 tablet (250 mg) by mouth 2 times a day. 180 tablet 0    fexofenadine (Allegra) 180 mg tablet Take 1 tablet (180 mg) by mouth once daily.      flaxseed oiL 1,000 mg capsule Take 1 capsule (1,000 mg) by mouth 2 times a day.      flecainide (Tambocor) 100 mg tablet Take 1 tablet (100 mg) by mouth twice a day.      glucosamine-chondroitin 500-400 mg tablet Take 1 tablet by mouth 3 times a day.      hyoscyamine (Anaspaz, Levsin) 0.125 mg tablet Take 1 tablet (0.125 mg) by mouth every 6 hours if needed for cramping. 60 tablet 1    meloxicam (Mobic) 15  mg tablet Take 1 tablet (15 mg) by mouth once daily. 30 tablet 0    multivitamin tablet Take 1 tablet by mouth once daily.      ondansetron (Zofran) 4 mg tablet Take 1 tablet (4 mg) by mouth every 8 hours if needed for nausea or vomiting. 30 tablet 0    polyethylene glycol (Miralax) 17 gram packet Take 17 g by mouth if needed (as needed for constipation). 30 each 3    sildenafil (Viagra) 100 mg tablet TAKE 1 TABLET BY MOUTH DAILY 1 HOUR BEFORE NEEDED 30 tablet 0    turmeric-turmeric root extract 450-50 mg capsule Take by mouth.      wheat dextrin (Benefiber Sugar Free, dextrin,) 3 gram/4 gram powder Take 1 teaspoon daily with a full glass of water (8 ounces) 248 g 1    wheat dextrin (Benefiber Sugar Free, dextrin,) 3 gram/4 gram powder Take 1 teaspoon daily with a full glass of water (8 ounces) 248 g 1    [DISCONTINUED] buPROPion XL (Wellbutrin XL) 150 mg 24 hr tablet TAKE 1 TABLET BY MOUTH ONCE DAILY IN THE MORNING. DO NOT CRUSH, CHEW, OR SPLIT TABLET 20 tablet 0    [DISCONTINUED] buPROPion XL (Wellbutrin XL) 300 mg 24 hr tablet Take 1 tablet (300 mg) by mouth once daily in the morning for 20 days. Do not crush, chew, or split. 30 tablet 0    [DISCONTINUED] chlorthalidone (Hygroton) 25 mg tablet Take 1 tablet (25 mg) by mouth once daily for 20 days. 30 tablet 0    [DISCONTINUED] losartan (Cozaar) 100 mg tablet TAKE 1 TABLET (100 MG) BY MOUTH ONCE DAILY 90 tablet 0    [DISCONTINUED] metoprolol tartrate (Lopressor) 25 mg tablet Take 1 tablet (25 mg) by mouth 2 times a day for 20 days. 40 tablet 0    [DISCONTINUED] pantoprazole (ProtoNix) 40 mg EC tablet Take 1 tablet (40 mg) by mouth once daily in the morning. Take before meals. Do not crush, chew, or split. 20 tablet 0    [DISCONTINUED] buPROPion XL (Wellbutrin XL) 150 mg 24 hr tablet TAKE 1 TABLET BY MOUTH ONCE DAILY IN THE MORNING. DO NOT CRUSH, CHEW, OR SPLIT TABLET 30 tablet 0    [DISCONTINUED] buPROPion XL (Wellbutrin XL) 300 mg 24 hr tablet TAKE ONE TABLET  (300 MG) BY MOUTH ONCE DAILY 30 tablet 0    [DISCONTINUED] chlorthalidone (Hygroton) 25 mg tablet TAKE ONE TABLET BY MOUTH DAILY 30 tablet 0    [DISCONTINUED] metoprolol tartrate (Lopressor) 25 mg tablet Take 1 tablet (25 mg) by mouth 2 times a day.      [DISCONTINUED] pantoprazole (ProtoNix) 40 mg EC tablet TAKE ONE TABLET BY MOUTH DAILY - do not crush, chew or split 30 tablet 0     No current facility-administered medications on file prior to visit.       Concerns today:  Toe nail fungas    Lifestyle  Diet:  Could be improved  Physical Activity: Inactive (2/28/2024)    Exercise Vital Sign     Days of Exercise per Week: 0 days     Minutes of Exercise per Session: 0 min      Tobacco Use: Medium Risk (10/14/2024)    Patient History     Smoking Tobacco Use: Former     Smokeless Tobacco Use: Never     Passive Exposure: Not on file      Alcohol Use: Alcohol Misuse (2/28/2024)    AUDIT-C     Frequency of Alcohol Consumption: 4 or more times a week     Average Number of Drinks: 3 or 4     Frequency of Binge Drinking: Never      Depression: Not at risk (4/24/2024)    PHQ-2     PHQ-2 Score: 1      Stress: No Stress Concern Present (2/28/2024)    [employer] - Occupational Stress Questionnaire     Feeling of Stress : Not at all       Consultants:  GI for liver biopsy in past      Review of Systems   Constitutional:  Negative for fever.   Respiratory:  Negative for shortness of breath.    Cardiovascular:  Negative for chest pain.   Gastrointestinal:  Negative for nausea and vomiting.   Neurological:  Negative for dizziness and light-headedness.   All other systems reviewed and are negative.      Objective   BP (!) 133/92 (BP Location: Right arm, Patient Position: Sitting)   Pulse 73   Temp 36.7 °C (98 °F)   Resp 18   Wt 96.2 kg (212 lb)   SpO2 98%   BMI 31.31 kg/m²     Physical Exam  Vitals reviewed.   Constitutional:       General: He is not in acute distress.     Appearance: Normal  appearance. He is not toxic-appearing.   HENT:      Head: Normocephalic and atraumatic.      Nose: Nose normal.   Eyes:      Extraocular Movements: Extraocular movements intact.   Cardiovascular:      Rate and Rhythm: Normal rate and regular rhythm.      Heart sounds: No murmur heard.     No friction rub. No gallop.   Pulmonary:      Effort: Pulmonary effort is normal. No respiratory distress.      Breath sounds: Normal breath sounds. No wheezing, rhonchi or rales.   Skin:     General: Skin is warm and dry.      Comments: Bilateral toe nails with crusting, thickening, yellow/green discoloration   Neurological:      General: No focal deficit present.      Mental Status: He is alert.   Psychiatric:         Mood and Affect: Mood normal.         Behavior: Behavior normal.         Assessment & Plan  Adult wellness visit  Patient here for adult wellness examination.  He is up-to-date on colonoscopy and had lipid panel done the last 5 months which was reviewed at today's appointment.  We have ordered baseline lab work including CBC, CMP, PSA.  He had elevated PSA last year and on repeat it decreased slightly but was still mildly elevated.  At that time we discussed urology referral.  He is largely asymptomatic from urinary standpoint but does wake up nightly to urinate this may be due to his diuretic use.  Will recheck PSA and determine next steps.  Patient also also has a 20-pack-year history of smoking quit approximately 5 years ago.  Ordered low-dose CT for lung cancer screening as well as AAA for AAA screening.  Will call patient with results of lab work.  Orders:    Prostate Spec.Ag,Screen; Future    CBC; Future    Comprehensive metabolic panel; Future    Atrial fibrillation, unspecified type (Multi)  Patient has a history of A-fib status post ablation several years ago.  He has not seen cardiology in the past year and I recommend he follow-up to make sure that medications are stable and he does not quire more  frequent follow-up.  I have also refilled his metoprolol.  Orders:    metoprolol tartrate (Lopressor) 25 mg tablet; Take 1 tablet (25 mg) by mouth 2 times a day.    Referral to Cardiology; Future    Generalized anxiety disorder  DEBORA stable patient takes 450 mg of bupropion.  Patient has been on this for some time and in the past we have discussed trialing different medications, however declined doing so in the past as condition was stable.  Refills were sent in for medication.  Orders:    buPROPion XL (Wellbutrin XL) 150 mg 24 hr tablet; TAKE 1 TABLET BY MOUTH ONCE DAILY IN THE MORNING. DO NOT CRUSH, CHEW, OR SPLIT TABLET    buPROPion XL (Wellbutrin XL) 300 mg 24 hr tablet; Take 1 tablet (300 mg) by mouth once daily in the morning. Do not crush, chew, or split.    Onychomycosis  Patient has onychomycosis on bilateral great toenails.  They are currently going back as they fell off several months ago.  There are also some involvement of several other toenails.  I prescribed Penlac topical antifungal.  Patient has a history of fatty liver disease stage II and we would like to avoid using oral terbinafine at this time as they have could be hepatotoxic effect.  If he fails conservative treatment with topicals we will consider using terbinafine cautiously.  Orders:    ciclopirox (Penlac) 8 % solution; Apply 1 Application topically once daily at bedtime.    Anxiety disorder, unspecified type    Orders:    buPROPion XL (Wellbutrin XL) 150 mg 24 hr tablet; TAKE 1 TABLET BY MOUTH ONCE DAILY IN THE MORNING. DO NOT CRUSH, CHEW, OR SPLIT TABLET    buPROPion XL (Wellbutrin XL) 300 mg 24 hr tablet; Take 1 tablet (300 mg) by mouth once daily in the morning. Do not crush, chew, or split.    Hypertension, essential  Blood pressure in office was 133/92.  This is better than it has been in the past.  Encourage patient to start checking his blood pressures at home he is he has not been doing this consistently.  He agreed that he will  start keeping a blood pressure log.  I have refilled his medications chlorthalidone, losartan, metoprolol.  I have also ordered basic labs today to monitor kidney function electrolytes.  Orders:    chlorthalidone (Hygroton) 25 mg tablet; Take 1 tablet (25 mg) by mouth once daily.    losartan (Cozaar) 100 mg tablet; Take 1 tablet (100 mg) by mouth once daily.    metoprolol tartrate (Lopressor) 25 mg tablet; Take 1 tablet (25 mg) by mouth 2 times a day.    Epigastric abdominal pain    Orders:    pantoprazole (ProtoNix) 40 mg EC tablet; Take 1 tablet (40 mg) by mouth once daily in the morning. Take before meals. Do not crush, chew, or split.    Personal history of nicotine dependence    Orders:    Vascular US Abdominal Aorta Aneurysm AAA Screening; Future    CT lung screening low dose; Future    Screening for lung cancer         Encounter for abdominal aortic aneurysm (AAA) screening         Prostate cancer screening    Orders:    Prostate Spec.Ag,Screen; Future    Referral to Urology; Future        Reviewed Social Determinants of health with patient, discussed healthy lifestyle including 150 minutes of physical activity per week  Ordered/Reviewed baseline labwork -CBC, CMP, Lipid Panel  Immunizations Up-to-Date  Follow Up:  as needed

## 2024-10-14 NOTE — ASSESSMENT & PLAN NOTE
DEBORA stable patient takes 450 mg of bupropion.  Patient has been on this for some time and in the past we have discussed trialing different medications, however declined doing so in the past as condition was stable.  Refills were sent in for medication.  Orders:    buPROPion XL (Wellbutrin XL) 150 mg 24 hr tablet; TAKE 1 TABLET BY MOUTH ONCE DAILY IN THE MORNING. DO NOT CRUSH, CHEW, OR SPLIT TABLET    buPROPion XL (Wellbutrin XL) 300 mg 24 hr tablet; Take 1 tablet (300 mg) by mouth once daily in the morning. Do not crush, chew, or split.

## 2024-10-14 NOTE — ASSESSMENT & PLAN NOTE
Patient has a history of A-fib status post ablation several years ago.  He has not seen cardiology in the past year and I recommend he follow-up to make sure that medications are stable and he does not quire more frequent follow-up.  I have also refilled his metoprolol.  Orders:    metoprolol tartrate (Lopressor) 25 mg tablet; Take 1 tablet (25 mg) by mouth 2 times a day.    Referral to Cardiology; Future

## 2024-10-23 ENCOUNTER — TELEPHONE (OUTPATIENT)
Dept: ORTHOPEDIC SURGERY | Facility: CLINIC | Age: 59
End: 2024-10-23
Payer: COMMERCIAL

## 2024-10-25 ENCOUNTER — OFFICE VISIT (OUTPATIENT)
Dept: OPHTHALMOLOGY | Facility: CLINIC | Age: 59
End: 2024-10-25
Payer: COMMERCIAL

## 2024-10-25 DIAGNOSIS — H20.9 IRITIS: Primary | ICD-10-CM

## 2024-10-25 DIAGNOSIS — H47.322 DRUSEN OF LEFT OPTIC DISC: ICD-10-CM

## 2024-10-25 DIAGNOSIS — H11.153 PINGUECULA OF BOTH EYES: ICD-10-CM

## 2024-10-25 PROBLEM — R73.9 HYPERGLYCEMIA: Status: RESOLVED | Noted: 2023-10-24 | Resolved: 2024-10-25

## 2024-10-25 PROCEDURE — 99204 OFFICE O/P NEW MOD 45 MIN: CPT | Performed by: OPTOMETRIST

## 2024-10-25 RX ORDER — CYCLOPENTOLATE HYDROCHLORIDE 10 MG/ML
1 SOLUTION/ DROPS OPHTHALMIC 2 TIMES DAILY
Qty: 5 ML | Refills: 0 | Status: SHIPPED | OUTPATIENT
Start: 2024-10-25

## 2024-10-25 RX ORDER — PREDNISOLONE ACETATE 10 MG/ML
1 SUSPENSION/ DROPS OPHTHALMIC 4 TIMES DAILY
Qty: 5 ML | Refills: 0 | Status: SHIPPED | OUTPATIENT
Start: 2024-10-25 | End: 2024-11-08

## 2024-10-25 ASSESSMENT — CONF VISUAL FIELD
OS_SUPERIOR_TEMPORAL_RESTRICTION: 0
OS_INFERIOR_TEMPORAL_RESTRICTION: 0
OS_NORMAL: 1
OD_INFERIOR_TEMPORAL_RESTRICTION: 0
OS_SUPERIOR_NASAL_RESTRICTION: 0
OD_SUPERIOR_TEMPORAL_RESTRICTION: 0
OD_INFERIOR_NASAL_RESTRICTION: 0
OS_INFERIOR_NASAL_RESTRICTION: 0
OD_NORMAL: 1
OD_SUPERIOR_NASAL_RESTRICTION: 0

## 2024-10-25 ASSESSMENT — TONOMETRY
OS_IOP_MMHG: 19
OD_IOP_MMHG: 19
IOP_METHOD: TONOPEN

## 2024-10-25 ASSESSMENT — CUP TO DISC RATIO
OD_RATIO: 0.05
OS_RATIO: 0.15

## 2024-10-25 ASSESSMENT — VISUAL ACUITY
OD_CC: 20/20
METHOD: SNELLEN - LINEAR
OS_CC: 20/20
OD_CC+: -1

## 2024-10-25 ASSESSMENT — EXTERNAL EXAM - RIGHT EYE: OD_EXAM: NORMAL

## 2024-10-25 ASSESSMENT — ENCOUNTER SYMPTOMS: EYES NEGATIVE: 1

## 2024-10-25 ASSESSMENT — EXTERNAL EXAM - LEFT EYE: OS_EXAM: NORMAL

## 2024-10-25 ASSESSMENT — SLIT LAMP EXAM - LIDS
COMMENTS: NORMAL
COMMENTS: NORMAL

## 2024-10-25 NOTE — PROGRESS NOTES
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 If improved/resolved perform Fundus autofluorescence and optic nerve OCT.     Pt was also examined by Dr. Tarango - and I agree with the findings.

## 2024-10-30 ENCOUNTER — HOSPITAL ENCOUNTER (OUTPATIENT)
Dept: RADIOLOGY | Facility: HOSPITAL | Age: 59
Discharge: HOME | End: 2024-10-30
Payer: COMMERCIAL

## 2024-10-30 DIAGNOSIS — Z87.891 PERSONAL HISTORY OF NICOTINE DEPENDENCE: ICD-10-CM

## 2024-10-30 PROCEDURE — 71271 CT THORAX LUNG CANCER SCR C-: CPT | Performed by: RADIOLOGY

## 2024-10-30 PROCEDURE — 71271 CT THORAX LUNG CANCER SCR C-: CPT

## 2024-10-31 ENCOUNTER — APPOINTMENT (OUTPATIENT)
Dept: OPHTHALMOLOGY | Facility: CLINIC | Age: 59
End: 2024-10-31
Payer: COMMERCIAL

## 2024-10-31 ENCOUNTER — OFFICE VISIT (OUTPATIENT)
Dept: ORTHOPEDIC SURGERY | Facility: CLINIC | Age: 59
End: 2024-10-31
Payer: COMMERCIAL

## 2024-10-31 DIAGNOSIS — S43.422A SPRAIN OF LEFT ROTATOR CUFF CAPSULE, INITIAL ENCOUNTER: ICD-10-CM

## 2024-10-31 DIAGNOSIS — H20.9 IRITIS: Primary | ICD-10-CM

## 2024-10-31 DIAGNOSIS — S46.102A INJURY OF TENDON OF LONG HEAD OF LEFT BICEPS, INITIAL ENCOUNTER: ICD-10-CM

## 2024-10-31 DIAGNOSIS — H43.811 VITREOUS DEGENERATION OF RIGHT EYE: ICD-10-CM

## 2024-10-31 DIAGNOSIS — K76.0 FATTY LIVER: Primary | ICD-10-CM

## 2024-10-31 DIAGNOSIS — H47.323 DRUSEN OF BOTH OPTIC DISCS: ICD-10-CM

## 2024-10-31 PROBLEM — H11.153 PINGUECULA OF BOTH EYES: Status: RESOLVED | Noted: 2024-10-25 | Resolved: 2024-10-31

## 2024-10-31 PROCEDURE — 99213 OFFICE O/P EST LOW 20 MIN: CPT | Performed by: ORTHOPAEDIC SURGERY

## 2024-10-31 RX ORDER — FLAXSEED OIL
OIL (ML) MISCELLANEOUS EVERY 12 HOURS
COMMUNITY

## 2024-10-31 RX ORDER — PREDNISOLONE ACETATE 10 MG/ML
1 SUSPENSION/ DROPS OPHTHALMIC 4 TIMES DAILY
Qty: 5 ML | Refills: 0 | Status: SHIPPED | OUTPATIENT
Start: 2024-10-31

## 2024-10-31 RX ORDER — GABAPENTIN 300 MG/1
CAPSULE ORAL
COMMUNITY
Start: 2023-04-12

## 2024-10-31 ASSESSMENT — CONF VISUAL FIELD
OS_INFERIOR_TEMPORAL_RESTRICTION: 0
OD_NORMAL: 1
OD_INFERIOR_NASAL_RESTRICTION: 0
OS_INFERIOR_NASAL_RESTRICTION: 0
OD_SUPERIOR_NASAL_RESTRICTION: 0
OD_INFERIOR_TEMPORAL_RESTRICTION: 0
OS_SUPERIOR_NASAL_RESTRICTION: 0
OD_SUPERIOR_TEMPORAL_RESTRICTION: 0
OS_NORMAL: 1
OS_SUPERIOR_TEMPORAL_RESTRICTION: 0

## 2024-10-31 ASSESSMENT — VISUAL ACUITY
OS_CC: 20/20
OD_CC: 20/20
CORRECTION_TYPE: GLASSES
OS_CC+: -1
METHOD: SNELLEN - LINEAR

## 2024-10-31 ASSESSMENT — ENCOUNTER SYMPTOMS
CARDIOVASCULAR NEGATIVE: 0
NEUROLOGICAL NEGATIVE: 0
PSYCHIATRIC NEGATIVE: 0
EYES NEGATIVE: 1
HEMATOLOGIC/LYMPHATIC NEGATIVE: 0
MUSCULOSKELETAL NEGATIVE: 0
ALLERGIC/IMMUNOLOGIC NEGATIVE: 0
ENDOCRINE NEGATIVE: 0
CONSTITUTIONAL NEGATIVE: 0
RESPIRATORY NEGATIVE: 0
GASTROINTESTINAL NEGATIVE: 0

## 2024-10-31 ASSESSMENT — SLIT LAMP EXAM - LIDS
COMMENTS: NORMAL
COMMENTS: NORMAL

## 2024-10-31 ASSESSMENT — CUP TO DISC RATIO
OD_RATIO: 0.05
OS_RATIO: 0.1

## 2024-10-31 ASSESSMENT — TONOMETRY
IOP_METHOD: GOLDMANN APPLANATION
OD_IOP_MMHG: 22
OS_IOP_MMHG: 24

## 2024-10-31 ASSESSMENT — EXTERNAL EXAM - LEFT EYE: OS_EXAM: NORMAL

## 2024-10-31 ASSESSMENT — EXTERNAL EXAM - RIGHT EYE: OD_EXAM: NORMAL

## 2024-11-11 ENCOUNTER — APPOINTMENT (OUTPATIENT)
Dept: OPHTHALMOLOGY | Facility: CLINIC | Age: 59
End: 2024-11-11
Payer: COMMERCIAL

## 2024-11-21 ENCOUNTER — APPOINTMENT (OUTPATIENT)
Dept: ORTHOPEDIC SURGERY | Facility: CLINIC | Age: 59
End: 2024-11-21
Payer: COMMERCIAL

## 2024-11-25 DIAGNOSIS — I48.91 ATRIAL FIBRILLATION, UNSPECIFIED TYPE (MULTI): Primary | ICD-10-CM

## 2024-11-25 DIAGNOSIS — I48.91 ATRIAL FIBRILLATION, UNSPECIFIED TYPE (MULTI): ICD-10-CM

## 2024-11-25 RX ORDER — FLECAINIDE ACETATE 100 MG/1
100 TABLET ORAL 2 TIMES DAILY
Qty: 60 TABLET | Refills: 0 | Status: SHIPPED | OUTPATIENT
Start: 2024-11-25 | End: 2024-12-25

## 2024-11-25 RX ORDER — FLECAINIDE ACETATE 100 MG/1
100 TABLET ORAL 2 TIMES DAILY
Qty: 60 TABLET | Refills: 0 | OUTPATIENT
Start: 2024-11-25 | End: 2024-12-25

## 2024-11-25 RX ORDER — FLECAINIDE ACETATE 100 MG/1
100 TABLET ORAL EVERY 12 HOURS
Qty: 180 TABLET | Refills: 0 | OUTPATIENT
Start: 2024-11-25

## 2024-12-04 ENCOUNTER — APPOINTMENT (OUTPATIENT)
Dept: CARDIOLOGY | Facility: CLINIC | Age: 59
End: 2024-12-04
Payer: COMMERCIAL

## 2024-12-26 ENCOUNTER — APPOINTMENT (OUTPATIENT)
Dept: CARDIOLOGY | Facility: CLINIC | Age: 59
End: 2024-12-26
Payer: COMMERCIAL

## 2024-12-30 NOTE — TELEPHONE ENCOUNTER
Patient will not have insurance until next month. He needs a refill of Losartan. His cardiologist will not refill it without an appointment. He is asking for 1 month supply until he gets insurance.    0 = swallows foods/liquids without difficulty

## 2025-01-02 ENCOUNTER — OFFICE VISIT (OUTPATIENT)
Dept: ORTHOPEDIC SURGERY | Facility: CLINIC | Age: 60
End: 2025-01-02
Payer: COMMERCIAL

## 2025-01-02 DIAGNOSIS — S46.102A INJURY OF TENDON OF LONG HEAD OF LEFT BICEPS, INITIAL ENCOUNTER: Primary | ICD-10-CM

## 2025-01-02 DIAGNOSIS — S43.422A SPRAIN OF LEFT ROTATOR CUFF CAPSULE, INITIAL ENCOUNTER: ICD-10-CM

## 2025-01-02 PROCEDURE — 99213 OFFICE O/P EST LOW 20 MIN: CPT | Performed by: ORTHOPAEDIC SURGERY

## 2025-01-02 NOTE — PROGRESS NOTES
History of Present Illness   The patient is here for his left shoulder.  I had submitted for a corticosteroid injection into the subacromial space and therapy which was denied by Worker's Compensation.    He returns today stating he has some continued pain laterally.  He also developed some pain a couple days ago and felt his Karl muscle became a little more pronounced.     Review of Systems   GENERAL: Negative for malaise, significant weight loss, fever  MUSCULOSKELETAL: see HPI  NEURO:  Negative     Past Medical History:   Diagnosis Date    Arthritis     Depression     Hypertension     Personal history of other diseases of the circulatory system 03/30/2022    History of hypertension        Medication Documentation Review Audit       Reviewed by Sonali Tarango OD (Optometrist) on 10/31/24 at 1517      Medication Order Taking? Sig Documenting Provider Last Dose Status   buPROPion XL (Wellbutrin XL) 150 mg 24 hr tablet [id number] Yes TAKE 1 TABLET BY MOUTH ONCE DAILY IN THE MORNING. DO NOT CRUSH, CHEW, OR SPLIT TABLET Stanley Thurston, DO  Active   buPROPion XL (Wellbutrin XL) 300 mg 24 hr tablet [id number] Yes Take 1 tablet (300 mg) by mouth once daily in the morning. Do not crush, chew, or split. Stanley Thurston DO  Active   chlorthalidone (Hygroton) 25 mg tablet [id number] Yes Take 1 tablet (25 mg) by mouth once daily. Stanley Tuhrston DO  Active   ciclopirox (Penlac) 8 % solution [id number] Yes Apply 1 Application topically once daily at bedtime. Stanley Thurston DO  Active   cyclopentolate (CyclogyL) 1 % ophthalmic solution [id number] Yes Administer 1 drop into the left eye 2 times a day. Keith Gallo MD  Active   famciclovir (Famvir) 250 mg tablet [id number] Yes Take 1 tablet (250 mg) by mouth 2 times a day. Stanley Thruston DO  Active   fexofenadine (Allegra) 180 mg tablet [id number] Yes Take 1 tablet (180 mg) by mouth once daily. Historical Provider, MD  Active   flaxseed oiL (Linseed Oil) oil [id number]  Yes Take by mouth every 12 hours. Historical Provider, MD  Active   flaxseed oiL 1,000 mg capsule [id number] Yes Take 1 capsule (1,000 mg) by mouth 2 times a day. Historical Provider, MD  Active   flecainide (Tambocor) 100 mg tablet [id number] Yes Take 1 tablet (100 mg) by mouth twice a day. Historical Provider, MD  Active   gabapentin (Neurontin) 300 mg capsule [id number] Yes Take by mouth. Historical Provider, MD  Active   glucosamine-chondroitin 500-400 mg tablet [id number] Yes Take 1 tablet by mouth 3 times a day. Historical Provider, MD  Active   hyoscyamine (Anaspaz, Levsin) 0.125 mg tablet [id number] Yes Take 1 tablet (0.125 mg) by mouth every 6 hours if needed for cramping. Keyur Bowden,   Active   losartan (Cozaar) 100 mg tablet [id number] Yes Take 1 tablet (100 mg) by mouth once daily. Stanley Thurston, DO  Active   meloxicam (Mobic) 15 mg tablet [id number] Yes Take 1 tablet (15 mg) by mouth once daily. Marlo Taylor MD  Active   metoprolol tartrate (Lopressor) 25 mg tablet [id number] Yes Take 1 tablet (25 mg) by mouth 2 times a day. Stanley Thurston DO  Active   multivitamin tablet [id number] Yes Take 1 tablet by mouth once daily. Sheryl Provider, MD  Active   ondansetron (Zofran) 4 mg tablet [id number] Yes Take 1 tablet (4 mg) by mouth every 8 hours if needed for nausea or vomiting. Keyur Bowden DO  Active   pantoprazole (ProtoNix) 40 mg EC tablet [id number] Yes Take 1 tablet (40 mg) by mouth once daily in the morning. Take before meals. Do not crush, chew, or split. Stanley Thurston, DO  Active   polyethylene glycol (Miralax) 17 gram packet [id number] Yes Take 17 g by mouth if needed (as needed for constipation). Marie Alegria, APRN-CNP  Active   prednisoLONE acetate (Pred-Forte) 1 % ophthalmic suspension [id number] Yes Administer 1 drop into the left eye 4 times a day for 14 days. Keith Gallo MD  Active   sildenafil (Viagra) 100 mg tablet [id number] Yes TAKE 1 TABLET BY MOUTH DAILY 1 HOUR BEFORE NEEDED Rico  Kall, DO  Active   turmeric-turmeric root extract 450-50 mg capsule [id number] Yes Take by mouth. Historical Provider, MD  Active   wheat dextrin (Benefiber Sugar Free, dextrin,) 3 gram/4 gram powder [id number] Yes Take 1 teaspoon daily with a full glass of water (8 ounces) Marie Alegria APRN-CNP  Active   wheat dextrin (Benefiber Sugar Free, dextrin,) 3 gram/4 gram powder [id number] Yes Take 1 teaspoon daily with a full glass of water (8 ounces) Marie Alegria APRN-CNP  Active                     Physical Exam  This is a male in no acute distress  Left shoulder:  170 degrees forward elevation, internal Tatian L4, external rotation to 40 degrees  There is a Karl muscle deformity  He has some pain on stressing the cuff with Jobes test laterally  External rotation test is negative     Imaging  CT lung screening low dose  Narrative: Interpreted By:  Garima Clifton,   STUDY:  CT LUNG SCREENING LOW DOSE      INDICATION:  Signs/Symptoms:screening.      COMPARISON:  None.      ACCESSION NUMBER(S):  [id number]      ORDERING CLINICIAN:  HUBERT MCCRARY      TECHNIQUE:  Helical data acquisition of the chest was obtained without IV  contrast material.  Images were reformatted in axial, coronal, and  sagittal planes.      FINDINGS:  LUNGS AND AIRWAYS:  The trachea and central airways are patent. No endobronchial lesion  is seen. There is no focal consolidation, pleural effusion, or  pneumothorax.      4 mm left upper lobe nodule, image 176, stable when compared with  previous cardiac scoring CT.      Pulmonary nodules as described below not included in field of view on  the previous study:  3 mm left upper lobe nodule on image 48.  3 mm left upper lobe nodule, image 71.  4 mm right middle lobe nodule, image 202, stable.  4 mm right middle lobe nodule, image 215., stable      MEDIASTINUM AND RONAN, LOWER NECK AND AXILLA:  The visualized thyroid gland is within normal limits.  No evidence of thoracic lymphadenopathy by CT  criteria.  Esophagus appears within normal limits as seen.      HEART AND VESSELS:  The thoracic aorta normal in course and caliber.  Main pulmonary artery and its branches are normal in caliber.      No significant coronary artery calcifications. Study is not optimized  for evaluation of coronary arteries.      The cardiac chambers are not enlarged.  There is no pericardial effusion seen.      UPPER ABDOMEN:  Diffuse hypoattenuation of the hepatic parenchyma suggesting  steatosis. Correlate with hepatic function studies.      CHEST WALL AND OSSEOUS STRUCTURES:  Chest wall is within normal limits.  Midthoracic spine degenerative changes with disc height loss and  osteophytes. No acute osseous pathology. There are no suspicious  osseous lesions.      Impression: 1. Pulmonary nodules measuring up to 4 mm as described above, likely  benign. Continued annual screening with low-dose noncontrast chest CT  is recommended.  2. Diffuse hypoattenuation of the hepatic parenchyma suggesting  steatosis. Correlate with hepatic function studies.      LUNG RADS CATEGORY:  Lung Rad: Lung-RADS 2 (Benign Appearance or Indolent Behavior)      Recommendation: Continue annual screening with Low Dose Chest CT in  12 months, recommended as per American College of [employer]iology  Guidelines Lung-RADS Version 2022.      MACRO:  None.      Signed by: Garima Clifton 10/31/2024 5:31 PM  Dictation workstation:   [id number]  Color Fundus Photography - OU - Both Eyes  Right Eye  Progression has no prior data.     Left Eye  Progression has no prior data.     Notes  Right eye: Good quality, baseline, central PVD +barr ring, optic nerve   temporal indistinct margins; FAF confirms optic nerve drusen.  Left eye: Good quality, baseline, optic nerve indistinct margins; FAF   confirms optic nerve drusen.  OCT, Retina - OU - Both Eyes  Right Eye  Quality was good. Progression has no prior data.     Left Eye  Quality was good. Progression has no prior data.      Notes  Optic nerve (ON) OCT performed:  Right eye: Good quality, baseline, mild inferior thinning.  Left eye: Good quality, baseline, mild overall RNFL thinning.       Assessment   Left long head of the biceps tendon rupture  Left shoulder sprain with rotator cuff tendinitis     Plan  Continue Aleve  He has an exam coming up I believe an independent medical exam.  It is my opinion that a corticosteroid injection into the subacromial space as well as physical therapy would be of value for his shoulder sprain and rotator cuff tendinosis.    Await to hear further notice from the [place] of Worker's Compensation   All questions answered          Procedures

## 2025-01-08 DIAGNOSIS — F41.1 GENERALIZED ANXIETY DISORDER: ICD-10-CM

## 2025-01-08 DIAGNOSIS — F41.9 ANXIETY DISORDER, UNSPECIFIED TYPE: ICD-10-CM

## 2025-01-09 ENCOUNTER — HOSPITAL ENCOUNTER (OUTPATIENT)
Dept: RADIOLOGY | Facility: CLINIC | Age: 60
Discharge: HOME | End: 2025-01-09
Payer: COMMERCIAL

## 2025-01-09 ENCOUNTER — APPOINTMENT (OUTPATIENT)
Dept: ORTHOPEDIC SURGERY | Facility: CLINIC | Age: 60
End: 2025-01-09
Payer: COMMERCIAL

## 2025-01-09 DIAGNOSIS — M25.551 RIGHT HIP PAIN: ICD-10-CM

## 2025-01-09 DIAGNOSIS — M70.61 GREATER TROCHANTERIC BURSITIS OF BOTH HIPS: Primary | ICD-10-CM

## 2025-01-09 DIAGNOSIS — M25.552 LEFT HIP PAIN: ICD-10-CM

## 2025-01-09 DIAGNOSIS — M70.62 GREATER TROCHANTERIC BURSITIS OF BOTH HIPS: Primary | ICD-10-CM

## 2025-01-09 PROCEDURE — 99214 OFFICE O/P EST MOD 30 MIN: CPT | Performed by: ORTHOPAEDIC SURGERY

## 2025-01-09 PROCEDURE — 73522 X-RAY EXAM HIPS BI 3-4 VIEWS: CPT

## 2025-01-09 RX ORDER — BUPROPION HYDROCHLORIDE 300 MG/1
TABLET ORAL
Qty: 90 TABLET | Refills: 0 | Status: SHIPPED | OUTPATIENT
Start: 2025-01-09

## 2025-01-09 RX ORDER — METHYLPREDNISOLONE 4 MG/1
TABLET ORAL
Qty: 21 TABLET | Refills: 0 | Status: SHIPPED | OUTPATIENT
Start: 2025-01-09

## 2025-01-09 NOTE — PROGRESS NOTES
History of Present Illness   This is a 59-year-old male here for his hips.  Planes of bilateral hip pain, the left is more painful than the right.  He points mainly though into the low back.  He gets some groin pain on the left but it is mainly low back to the buttock and it wraps around to the thighs.  He also has numbness on the left over the anterior shin.    He has had a lumbar fusion at [facility] 1 year ago.  He had 2 months of relief and his pain has been worsening ever since.    He denies any bowel or bladder dysfunction.     Review of Systems   GENERAL: Negative for malaise, significant weight loss, fever  MUSCULOSKELETAL: see HPI  NEURO:  Negative     Past Medical History:   Diagnosis Date    Arthritis     Depression     Hypertension     Personal history of other diseases of the circulatory system 03/30/2022    History of hypertension        Medication Documentation Review Audit       Reviewed by Sonali Tarango OD (Optometrist) on 10/31/24 at 1517      Medication Order Taking? Sig Documenting Provider Last Dose Status   buPROPion XL (Wellbutrin XL) 150 mg 24 hr tablet [id number] Yes TAKE 1 TABLET BY MOUTH ONCE DAILY IN THE MORNING. DO NOT CRUSH, CHEW, OR SPLIT TABLET Stanley Thurston DO  Active   buPROPion XL (Wellbutrin XL) 300 mg 24 hr tablet [id number] Yes Take 1 tablet (300 mg) by mouth once daily in the morning. Do not crush, chew, or split. Stanley Thurston DO  Active   chlorthalidone (Hygroton) 25 mg tablet [id number] Yes Take 1 tablet (25 mg) by mouth once daily. Stanley Thurston DO  Active   ciclopirox (Penlac) 8 % solution [id number] Yes Apply 1 Application topically once daily at bedtime. Stanley Thurston DO  Active   cyclopentolate (CyclogyL) 1 % ophthalmic solution [id number] Yes Administer 1 drop into the left eye 2 times a day. Keith Gallo MD  Active   famciclovir (Famvir) 250 mg tablet [id number] Yes Take 1 tablet (250 mg) by mouth 2 times a day. Stanley Thurston DO   Active   fexofenadine (Allegra) 180 mg tablet [id number] Yes Take 1 tablet (180 mg) by mouth once daily. Historical Provider, MD  Active   flaxseed oiL (Linseed Oil) oil [id number] Yes Take by mouth every 12 hours. Historical Provider, MD  Active   flaxseed oiL 1,000 mg capsule [id number] Yes Take 1 capsule (1,000 mg) by mouth 2 times a day. Historical Provider, MD  Active   flecainide (Tambocor) 100 mg tablet [id number] Yes Take 1 tablet (100 mg) by mouth twice a day. Historical Provider, MD  Active   gabapentin (Neurontin) 300 mg capsule [id number] Yes Take by mouth. Historical Provider, MD  Active   glucosamine-chondroitin 500-400 mg tablet [id number] Yes Take 1 tablet by mouth 3 times a day. Historical Provider, MD  Active   hyoscyamine (Anaspaz, Levsin) 0.125 mg tablet [id number] Yes Take 1 tablet (0.125 mg) by mouth every 6 hours if needed for cramping. Keyur Bowden DO  Active   losartan (Cozaar) 100 mg tablet [id number] Yes Take 1 tablet (100 mg) by mouth once daily. Stanley Thurston, DO  Active   meloxicam (Mobic) 15 mg tablet [id number] Yes Take 1 tablet (15 mg) by mouth once daily. Marlo Taylor MD  Active   metoprolol tartrate (Lopressor) 25 mg tablet [id number] Yes Take 1 tablet (25 mg) by mouth 2 times a day. Stanley Thurston, DO  Active   multivitamin tablet [id number] Yes Take 1 tablet by mouth once daily. Historical Provider, MD  Active   ondansetron (Zofran) 4 mg tablet [id number] Yes Take 1 tablet (4 mg) by mouth every 8 hours if needed for nausea or vomiting. Keyur Bowden DO  Active   pantoprazole (ProtoNix) 40 mg EC tablet [id number] Yes Take 1 tablet (40 mg) by mouth once daily in the morning. Take before meals. Do not crush, chew, or split. Stanley Thurston, DO  Active   polyethylene glycol (Miralax) 17 gram packet [id number] Yes Take 17 g by mouth if needed (as needed for constipation). Marie Alegria, APRN-CNP  Active   prednisoLONE acetate (Pred-Forte) 1 % ophthalmic suspension [id number] Yes Administer 1 drop  into the left eye 4 times a day for 14 days. Keith Gallo MD  Active   sildenafil (Viagra) 100 mg tablet [id number] Yes TAKE 1 TABLET BY MOUTH DAILY 1 HOUR BEFORE NEEDED Rico Spencer DO  Active   turmeric-turmeric root extract 450-50 mg capsule [id number] Yes Take by mouth. Historical Provider, MD  Active   wheat dextrin (Benefiber Sugar Free, dextrin,) 3 gram/4 gram powder [id number] Yes Take 1 teaspoon daily with a full glass of water (8 ounces) CONSTANTINE Potter  Active   wheat dextrin (Benefiber Sugar Free, dextrin,) 3 gram/4 gram powder [id number] Yes Take 1 teaspoon daily with a full glass of water (8 ounces) CONSTANTINE Potter  Active                     Physical Exam  This is a male in no acute distress  Straight leg raise is positive on the left  Hip motion:  He has 120 degrees hip flexion bilaterally, internal rotation is 20 degrees bilaterally, external rotation is 40 degrees bilaterally.  He has no pain or irritability with hip motion.  He has 5/5 strength with hip flexion, knee extension, ankle dorsiflexion and plantarflexion.  He has tenderness to palpation in the lower lumbar spine.     Imaging  See dictated report       Assessment   Lumbar radiculopathy  Spinal stenosis  Asymptomatic mild hip arthrosis     Plan  Medrol Dosepak  Referral to spine team  Follow-up as needed for his hips  Questions answered    Procedures

## 2025-01-16 ENCOUNTER — APPOINTMENT (OUTPATIENT)
Dept: CARDIOLOGY | Facility: CLINIC | Age: 60
End: 2025-01-16
Payer: COMMERCIAL

## 2025-01-21 ENCOUNTER — APPOINTMENT (OUTPATIENT)
Dept: ORTHOPEDIC SURGERY | Facility: CLINIC | Age: 60
End: 2025-01-21
Payer: COMMERCIAL

## 2025-01-21 ENCOUNTER — HOSPITAL ENCOUNTER (OUTPATIENT)
Dept: RADIOLOGY | Facility: CLINIC | Age: 60
Discharge: HOME | End: 2025-01-21
Payer: COMMERCIAL

## 2025-01-21 DIAGNOSIS — M54.10 BACK PAIN WITH RADICULOPATHY: Primary | ICD-10-CM

## 2025-01-21 DIAGNOSIS — M54.10 BACK PAIN WITH RADICULOPATHY: ICD-10-CM

## 2025-01-21 PROCEDURE — 99214 OFFICE O/P EST MOD 30 MIN: CPT | Performed by: PHYSICIAN ASSISTANT

## 2025-01-21 PROCEDURE — 72110 X-RAY EXAM L-2 SPINE 4/>VWS: CPT | Performed by: RADIOLOGY

## 2025-01-21 PROCEDURE — 72120 X-RAY BEND ONLY L-S SPINE: CPT

## 2025-01-21 NOTE — PROGRESS NOTES
New patient to us established to the practice comes the office complaining of some low back pain which radiates down to the left side of the left hip and maybe into the left leg some.  He had a prior surgery about 3 years ago [place] Dr. fair, neurosurgery said he did well for a little short bit after surgery.  Then the pain, came back.  He is also got numbness and tingling down to the left leg but she the shin and lower part of the leg.  He did not have that prior to surgery.  Denies bowel or bladder complaints saddle anesthesia gait or balance changes denies any recent trauma accidents or falls to cause it denies fevers chills nausea vomiting or night sweats.  He saw our hip surgeon and he said his hip was fine.    Looked at patient's recent lab work.  Checked a metabolic panel glucose 111 sodium 138 potassium 4.1 we looked at primary doctors note check the medication list see if he is on a statin medication to cause muscular aches and pains I am not seeing that he is.    Physical exam: Well-nourished, well kept.  No lymphangitis or lymphadenopathy in the examined extremities.  Good perfusion to the extremities ×4.  Radial and dorsalis pedis pulses 2+.  Capillary refill to all 4 digits brisk.  No distal edema x 4.  Patient ambulating well without any difficulty.  Full range of motion cervical spine observation no major abnormalities.  Motor strength intact.  Decreased range of motion flexion-extension rotation lumbar spine tender left lower lumbar region good strength no instability moving upper extremities on any difficulty motor strength intact.  Examination of the lower extremities reveals no point tenderness, swelling, or deformity.  Range of motion of the hips, knees, and ankles are full without crepitance, instability, or exacerbation of pain.  Strength is 5/5 throughout.  No redness, abrasions, or lesions on all 4 extremities, head and neck, or trunk.  Patient neurologically  intact    X-ray: X-ray lumbar spine taken today and reviewed shows instrumentation from 3-5.  Good placement of instrumentation no loose migration of any hardware.  There is a collapse at L2-3.  Compared to prior x-ray back in 2023 there is further degenerative changes and collapse at that L2-3 level    Assessment: Patient with low back pains get pain into the hip and the left leg occasionally.  We talked about treatment options.  When I recommend some physical therapy.  To start with.  Get an MRI lumbar spine with and without contrast.  We also discussed the possibility of a lateral approach at the L2-3 level if it comes to a surgical intervention.    Plan: I get the patient [facility] in [place] physical therapy and MRI [place] for diagnostic purposes

## 2025-01-28 DIAGNOSIS — I10 HYPERTENSION, ESSENTIAL: ICD-10-CM

## 2025-01-28 DIAGNOSIS — R10.13 EPIGASTRIC ABDOMINAL PAIN: ICD-10-CM

## 2025-01-28 DIAGNOSIS — I48.91 ATRIAL FIBRILLATION, UNSPECIFIED TYPE (MULTI): ICD-10-CM

## 2025-01-28 DIAGNOSIS — B00.9 HERPES SIMPLEX: ICD-10-CM

## 2025-01-28 RX ORDER — FAMCICLOVIR 250 MG/1
250 TABLET ORAL 2 TIMES DAILY
Qty: 180 TABLET | Refills: 0 | Status: SHIPPED | OUTPATIENT
Start: 2025-01-28

## 2025-01-28 RX ORDER — METOPROLOL TARTRATE 25 MG/1
25 TABLET, FILM COATED ORAL 2 TIMES DAILY
Qty: 180 TABLET | Refills: 0 | Status: SHIPPED | OUTPATIENT
Start: 2025-01-28

## 2025-01-28 RX ORDER — CHLORTHALIDONE 25 MG/1
25 TABLET ORAL DAILY
Qty: 90 TABLET | Refills: 0 | Status: SHIPPED | OUTPATIENT
Start: 2025-01-28 | End: 2025-04-28

## 2025-01-28 RX ORDER — PANTOPRAZOLE SODIUM 40 MG/1
TABLET, DELAYED RELEASE ORAL
Qty: 90 TABLET | Refills: 0 | Status: SHIPPED | OUTPATIENT
Start: 2025-01-28

## 2025-01-29 RX ORDER — FLECAINIDE ACETATE 100 MG/1
100 TABLET ORAL 2 TIMES DAILY
Qty: 180 TABLET | Refills: 3 | OUTPATIENT
Start: 2025-01-29

## 2025-01-30 DIAGNOSIS — I48.91 ATRIAL FIBRILLATION, UNSPECIFIED TYPE (MULTI): ICD-10-CM

## 2025-01-30 RX ORDER — FLECAINIDE ACETATE 100 MG/1
100 TABLET ORAL 2 TIMES DAILY
Qty: 60 TABLET | Refills: 0 | Status: SHIPPED | OUTPATIENT
Start: 2025-01-30 | End: 2025-01-31 | Stop reason: SDUPTHER

## 2025-01-30 NOTE — TELEPHONE ENCOUNTER
Please send to Pharmacy. He did make appt for tomorrow. Lost Insurance -current coverage stops end of this month.(Part of the missed appt-concerns)

## 2025-01-31 ENCOUNTER — OFFICE VISIT (OUTPATIENT)
Dept: CARDIOLOGY | Facility: CLINIC | Age: 60
End: 2025-01-31
Payer: COMMERCIAL

## 2025-01-31 VITALS
HEIGHT: 69 IN | BODY MASS INDEX: 30.66 KG/M2 | WEIGHT: 207 LBS | OXYGEN SATURATION: 99 % | DIASTOLIC BLOOD PRESSURE: 70 MMHG | SYSTOLIC BLOOD PRESSURE: 116 MMHG | RESPIRATION RATE: 18 BRPM | HEART RATE: 81 BPM

## 2025-01-31 DIAGNOSIS — I10 HYPERTENSION, ESSENTIAL: ICD-10-CM

## 2025-01-31 DIAGNOSIS — I48.91 ATRIAL FIBRILLATION, UNSPECIFIED TYPE (MULTI): Primary | ICD-10-CM

## 2025-01-31 PROCEDURE — 99214 OFFICE O/P EST MOD 30 MIN: CPT | Performed by: NURSE PRACTITIONER

## 2025-01-31 PROCEDURE — 93000 ELECTROCARDIOGRAM COMPLETE: CPT | Performed by: NURSE PRACTITIONER

## 2025-01-31 PROCEDURE — 3074F SYST BP LT 130 MM HG: CPT | Performed by: NURSE PRACTITIONER

## 2025-01-31 PROCEDURE — 1036F TOBACCO NON-USER: CPT | Performed by: NURSE PRACTITIONER

## 2025-01-31 PROCEDURE — 3078F DIAST BP <80 MM HG: CPT | Performed by: NURSE PRACTITIONER

## 2025-01-31 PROCEDURE — 3008F BODY MASS INDEX DOCD: CPT | Performed by: NURSE PRACTITIONER

## 2025-01-31 RX ORDER — FLECAINIDE ACETATE 100 MG/1
100 TABLET ORAL 2 TIMES DAILY
Qty: 180 TABLET | Refills: 3 | Status: SHIPPED | OUTPATIENT
Start: 2025-01-31 | End: 2026-01-31

## 2025-01-31 NOTE — PATIENT INSTRUCTIONS
- I am sending you to Dr. Nick in electrophysiology to discuss repeating a monitor on you or even looking into a loop recorder. Just because you don't feel like you are in atrial fibrillation does not mean that you don't have occurrences. As you continue to age, your risk of stroke with Afib increases.  - Follow up with me in 1 year    It was so nice to see you! As always, I look forward to being your cardiac Nurse Practitioner. I am a huge believer in communicating with my patients. Please contact me at any time, if anything is not clear to you regarding anything we have discussed, or if new questions occur to you.

## 2025-01-31 NOTE — PROGRESS NOTES
Name : Kvng June   : 1965   MRN : [id number]   ENC Date : 2025    CC: Annual Exam, Atrial Fibrillation, Hypertension, Sleep Apnea, and LEATHA on PAP     HPI:    Kvng June is a 59 y.o. male PMHx pAfib s/p PVI (2019), LEATHA on CPAP, HTN & ADHD who presents today for annual cardiovascular follow up.     Kvng is very forgetful & has missed many appointments because of this. Reports that he set 2 alarms for this appointment & had his wife not yelled at him to leave, he would have forgotten.    Reports that he has been well since his last visit. Denies any chest pain, pressure, SOB/WRIGHT, PND, orthopnea, LE edema, palpitations, lightheadedness, dizziness, or syncope.     His wife works at a pharmacy & is the one who fills his pill boxes    CV Diagnostics:  CAC score 10/16/23:  LM 0,  LAD 0,  LCx 0,  RCA 0,   Total   0    Echo 10/8/2019: EF 63%, RV mildly dilated, LA mildly dilated, no significant valvular abnormalities.     ROS: unless otherwise noted in the history of present illness, all other systems were reviewed and they are negative for complaints     Allergies:  Amlodipine and Venlafaxine    Current Outpatient Medications   Medication Instructions    buPROPion XL (Wellbutrin XL) 150 mg 24 hr tablet TAKE 1 TABLET BY MOUTH ONCE DAILY IN THE MORNING. DO NOT CRUSH, CHEW, OR SPLIT TABLET    buPROPion XL (Wellbutrin XL) 300 mg 24 hr tablet TAKE 1 TABLET BY MOUTH ONCE DAILY IN THE MORNING. DO NOT CRUSH, CHEW OR SPLIT TABLETS.    chlorthalidone (HYGROTON) 25 mg, oral, Daily    ciclopirox (Penlac) 8 % solution 1 Application, Topical, Nightly    cyclopentolate (CyclogyL) 1 % ophthalmic solution 1 drop, Left Eye, 2 times daily    famciclovir (FAMVIR) 250 mg, oral, 2 times daily    fexofenadine (Allegra) 180 mg tablet 1 tablet, Daily    flaxseed oiL 1,000 mg capsule 1 capsule, 2 times daily    flecainide (TAMBOCOR) 100 mg, oral, 2 times daily    gabapentin (Neurontin) 300 mg capsule Take by mouth.     glucosamine-chondroitin 500-400 mg tablet 1 tablet, 3 times daily    hyoscyamine (ANASPAZ, LEVSIN) 0.125 mg, oral, Every 6 hours PRN    losartan (COZAAR) 100 mg, oral, Daily    metoprolol tartrate (LOPRESSOR) 25 mg, oral, 2 times daily    multivitamin tablet 1 tablet, Daily    ondansetron (ZOFRAN) 4 mg, oral, Every 8 hours PRN    pantoprazole (ProtoNix) 40 mg EC tablet TAKE 1 TABLET BY MOUTH ONCE DAILY IN THE MORNING BEFORE A MEAL. DO NOT CRUSH, CHEW OR SPLIT TABLETS.    polyethylene glycol (MIRALAX) 17 g, oral, As needed    prednisoLONE acetate (Pred-Forte) 1 % ophthalmic suspension 1 drop, Left Eye, 4 times daily    sildenafil (Viagra) 100 mg tablet TAKE 1 TABLET BY MOUTH DAILY 1 HOUR BEFORE NEEDED    turmeric-turmeric root extract 450-50 mg capsule Take by mouth.    wheat dextrin (Benefiber Sugar Free, dextrin,) 3 gram/4 gram powder Take 1 teaspoon daily with a full glass of water (8 ounces)    wheat dextrin (Benefiber Sugar Free, dextrin,) 3 gram/4 gram powder Take 1 teaspoon daily with a full glass of water (8 ounces)        Last Labs:  CBC  Lab Results   Component Value Date    WBC 6.9 10/14/2024    HGB 13.8 10/14/2024    HCT 41.3 10/14/2024     (H) 10/14/2024     10/14/2024       CMP  Lab Results   Component Value Date    CALCIUM 9.8 10/14/2024    PHOS 3.2 11/17/2023    PROT 7.1 10/14/2024    ALBUMIN 4.5 10/14/2024    AST 27 10/14/2024    ALT 31 10/14/2024    ALKPHOS 74 10/14/2024    BILITOT 0.4 10/14/2024       BMP   Lab Results   Component Value Date     10/14/2024    K 4.1 10/14/2024     10/14/2024    CO2 31 10/14/2024    GLUCOSE 111 (H) 10/14/2024    BUN 21 10/14/2024    CREATININE 1.13 10/14/2024       LIPID PANEL   Lab Results   Component Value Date    CHOL 169 04/24/2024    TRIG 71 04/24/2024    HDL 69.3 04/24/2024    CHHDL 2.4 04/24/2024    LDLF 101 (H) 08/16/2022    VLDL 14 04/24/2024    NHDL 100 04/24/2024       RENAL FUNCTION PANEL   Lab Results   Component Value Date     "GLUCOSE 111 (H) 10/14/2024     10/14/2024    K 4.1 10/14/2024     10/14/2024    CO2 31 10/14/2024    ANIONGAP 10 10/14/2024    BUN 21 10/14/2024    CREATININE 1.13 10/14/2024    GFRMALE 86 12/27/2022    CALCIUM 9.8 10/14/2024    PHOS 3.2 11/17/2023    ALBUMIN 4.5 10/14/2024        No results found for: \"BNP\", \"HGBA1C\"  I have reviewed the above labs & diagnostics    Last Recorded Vitals:  Vitals:    01/31/25 1440   BP: 116/70   BP Location: Left arm   Patient Position: Sitting   Pulse: 81   Resp: 18   SpO2: 99%   Weight: 93.9 kg (207 lb)   Height: 1.753 m (5' 9\")     Physical Exam:  On exam Mr. Kvng June appears his stated age, is alert and oriented x3, and in no acute distress. His sclera are anicteric and his oropharynx has moist mucous membranes. His neck is supple and without thyromegaly. The JVP is ~5 cm of water above the right atrium. His cardiac exam has regular rhythm, normal S1, S2. No S3/4. There are no murmurs. His lungs are clear to auscultation bilaterally and there is no dullness to percussion. His abdomen is soft, nontender with normoactive bowel sounds. There is no HJR. The extremities are warm and without edema. The skin is dry. There is no rash present. The distal pulses are 2-3+ in all four extremities. His mood and affect are appropriate for todays encounter.     Assessment/Plan:  1. pAfib s/p PVI (2019, Dr. Chow). Dr Chow took him off flecainide in 2020, started having PVCs so put him back on. Per EP note, restarted for 5% PVC burden. Last Echo 10/8/2019 EF 63%, RV mildly dilated, LA mildly dilated, no significant valvular abnormalities. No c/o Palpitations/PVCs. EKG shows Sinus Rhythm with HR 73 bpm with incomplete RBBB, QTCh 417  - c/w Metoprolol tartrate 25mg BID  - c/w Flecainide 100mg Q12hrs  - referral to Dr. Nick to discuss possible ILR & anticoagulation.     2. Hypertension. /80 mmHg today. Discussed importance of good blood pressure control to avoid long-term " complications such as heart attack and stroke. Patient is aware that blood pressure goal is less than 130/80.   - continue with current medication regimen.     Follow up in 1 year or sooner as needed     Tracy M Schwab, JUAN-CNP

## 2025-02-05 ENCOUNTER — APPOINTMENT (OUTPATIENT)
Dept: CARDIOLOGY | Facility: CLINIC | Age: 60
End: 2025-02-05
Payer: COMMERCIAL

## 2025-02-13 DIAGNOSIS — F41.1 GENERALIZED ANXIETY DISORDER: ICD-10-CM

## 2025-02-13 DIAGNOSIS — F41.9 ANXIETY DISORDER, UNSPECIFIED TYPE: ICD-10-CM

## 2025-02-14 RX ORDER — BUPROPION HYDROCHLORIDE 300 MG/1
TABLET ORAL
Qty: 90 TABLET | Refills: 0 | Status: SHIPPED | OUTPATIENT
Start: 2025-02-14

## 2025-02-14 RX ORDER — BUPROPION HYDROCHLORIDE 150 MG/1
TABLET ORAL
Qty: 90 TABLET | Refills: 0 | Status: SHIPPED | OUTPATIENT
Start: 2025-02-14

## 2025-02-24 ENCOUNTER — APPOINTMENT (OUTPATIENT)
Dept: RADIOLOGY | Facility: HOSPITAL | Age: 60
End: 2025-02-24
Payer: COMMERCIAL

## 2025-03-19 ENCOUNTER — OFFICE VISIT (OUTPATIENT)
Dept: ORTHOPEDIC SURGERY | Facility: CLINIC | Age: 60
End: 2025-03-19
Payer: COMMERCIAL

## 2025-03-19 DIAGNOSIS — M54.10 BACK PAIN WITH RADICULOPATHY: Primary | ICD-10-CM

## 2025-03-19 PROCEDURE — 99213 OFFICE O/P EST LOW 20 MIN: CPT | Performed by: PHYSICIAN ASSISTANT

## 2025-03-19 PROCEDURE — 99214 OFFICE O/P EST MOD 30 MIN: CPT | Performed by: PHYSICIAN ASSISTANT

## 2025-03-19 PROCEDURE — 1036F TOBACCO NON-USER: CPT | Performed by: PHYSICIAN ASSISTANT

## 2025-03-19 NOTE — PROGRESS NOTES
Established patient follow-up after some physical therapy.  Patient was having some back pain and some numbness and tingling in parts of his foot.  He went to physical therapy and they demoted him to aqua therapy which she continued with that but now symptoms are worse now both feet are completely numb.  Does get bilateral leg symptoms little difficulty walking affecting his daily and bodily function.  Denies bowel or bladder complaints saddle anesthesia.    Physical exam: Well-nourished, well kept.  No lymphangitis or lymphadenopathy in the examined extremities.  Good perfusion to the lower extremities bilaterally.  Dorsalis pedis pulses 2+.  Capillary refill to the digits brisk.  No distal edema.  Patient walks with antalgic gait.  Severe decreased range of motion flexion-extension rotation lumbar spine tender good strength no instability Global weakness in the lower extremities.  No redness, abrasions, or lesions on the lower extremities bilaterally.  Patient neurologically intact    Assessment: This a patient with back pain bilateral leg symptoms are logical changes in sensory changes in lower extremities is gotten worse since therapy.  Needs evaluation ASAP.    Plan: We will resubmit for that MRIs to be done as soon as possible and I will follow-up with him after that is done.

## 2025-03-25 ENCOUNTER — HOSPITAL ENCOUNTER (OUTPATIENT)
Dept: RADIOLOGY | Facility: CLINIC | Age: 60
End: 2025-03-25
Payer: COMMERCIAL

## 2025-03-29 ENCOUNTER — HOSPITAL ENCOUNTER (OUTPATIENT)
Dept: RADIOLOGY | Facility: HOSPITAL | Age: 60
Discharge: HOME | End: 2025-03-29
Payer: COMMERCIAL

## 2025-03-29 DIAGNOSIS — M54.10 BACK PAIN WITH RADICULOPATHY: ICD-10-CM

## 2025-03-29 PROCEDURE — 72158 MRI LUMBAR SPINE W/O & W/DYE: CPT

## 2025-03-29 PROCEDURE — A9575 INJ GADOTERATE MEGLUMI 0.1ML: HCPCS | Performed by: PHYSICIAN ASSISTANT

## 2025-03-29 PROCEDURE — 2550000001 HC RX 255 CONTRASTS: Performed by: PHYSICIAN ASSISTANT

## 2025-03-29 PROCEDURE — 72158 MRI LUMBAR SPINE W/O & W/DYE: CPT | Performed by: RADIOLOGY

## 2025-03-29 RX ORDER — GADOTERATE MEGLUMINE 376.9 MG/ML
20 INJECTION INTRAVENOUS
Status: COMPLETED | OUTPATIENT
Start: 2025-03-29 | End: 2025-03-29

## 2025-03-29 RX ADMIN — GADOTERATE MEGLUMINE 20 ML: 376.9 INJECTION INTRAVENOUS at 08:45

## 2025-04-01 ENCOUNTER — APPOINTMENT (OUTPATIENT)
Dept: ORTHOPEDIC SURGERY | Facility: CLINIC | Age: 60
End: 2025-04-01
Payer: COMMERCIAL

## 2025-04-01 DIAGNOSIS — M54.10 BACK PAIN WITH RADICULOPATHY: Primary | ICD-10-CM

## 2025-04-01 PROCEDURE — 99214 OFFICE O/P EST MOD 30 MIN: CPT | Performed by: PHYSICIAN ASSISTANT

## 2025-04-01 PROCEDURE — 1036F TOBACCO NON-USER: CPT | Performed by: PHYSICIAN ASSISTANT

## 2025-04-01 NOTE — PROGRESS NOTES
Established patient of the practice follow-up MRI.  This the patient was having back pain and some numbness and tingling to parts of his foot.  Bilateral leg symptoms and sensory changes.  Denies bowel or bladder complaints saddle anesthesia.    Physical exam: Well-nourished, well kept.  No lymphangitis or lymphadenopathy in the examined extremities.  Good perfusion to the lower extremities bilaterally.  Dorsalis pedis pulses 2+.  Capillary refill to the digits brisk.  No distal edema.  Gait normal.  Can walk on heels and toes.  Decreased range of motion flexion-extension rotation lumbar spine good strength no instability patient moving lower extremities without any difficulty motor strength intact no redness, abrasions, or lesions on all 4 extremities, head and neck, or trunk.  Patient neurologically intact    MRI: MRI was reviewed report reviewed as well showing a fusion with instrumentation L3-L5 with hardware.  There is a degenerative disc at L5-S1.  There is bilateral foraminal stenosis which is moderate bilaterally.  There is also a little collapse at the L2-3 level above his prior surgery.  Foraminally at the L2-3 level is not that bad there is some central narrowing.  And epidural lipomatosis noted.    Assessment: This a patient with back pain bilateral leg symptoms.  He says his symptoms are bad enough that he would consider another operation.  I told him that operation would be a difficult task due to the hardware that comes down to L5 and the scar tissue that he has from his prior surgery.  He does have the collapse at L2-3 and we could always do a lateral approach at that level but I do not think that the cause of his leg symptoms that he is having.  An interbody fusion at L5-S1 with take care of that degenerative disc inject that disc back up to where it should be.  Decompressing the nerves and then we would have to hook onto the existing hardware.  I explained to him that Dr. Moraes may not be the  specialist to take care of this..  Patient is a non-smoker is not a diabetic he is not on any blood thinners.    Plan: I will have him see Dr. Moraes to see if he recommends an operation and if so something that we could address.  If not we will get him to the right specialist to handle it.

## 2025-04-03 ENCOUNTER — OFFICE VISIT (OUTPATIENT)
Dept: ORTHOPEDIC SURGERY | Facility: CLINIC | Age: 60
End: 2025-04-03
Payer: COMMERCIAL

## 2025-04-03 VITALS — WEIGHT: 200 LBS | BODY MASS INDEX: 28.63 KG/M2 | HEIGHT: 70 IN

## 2025-04-03 DIAGNOSIS — M54.10 BACK PAIN WITH RADICULOPATHY: ICD-10-CM

## 2025-04-03 PROCEDURE — 1036F TOBACCO NON-USER: CPT | Performed by: ORTHOPAEDIC SURGERY

## 2025-04-03 PROCEDURE — 3008F BODY MASS INDEX DOCD: CPT | Performed by: ORTHOPAEDIC SURGERY

## 2025-04-03 PROCEDURE — 99215 OFFICE O/P EST HI 40 MIN: CPT | Performed by: ORTHOPAEDIC SURGERY

## 2025-04-03 NOTE — PROGRESS NOTES
Kvng June is a 59 y.o. male who presents for New Patient Visit of the Lower Back (Into bilat legs/Feet are numb/X-rays and MRI done at ).    HPI:  59-year-old gentleman here for new patient evaluation of low back pain and bilateral leg and feet radicular symptoms.  Sent by Orion Colby.  He denies any fever chills nausea vomiting night sweats.  He has no bowel or bladder complaints.    Physical exam:  Well-nourished, well kept.No lymphangitis or lymphadenopathy in the examined extremities.  Gait normal.  Can stand on heels and toes.   Examination of the back shows tenderness in the paraspinous musculature out into the left lumbosacral area.  There is decreased range of motion in all directions due to guarding/muscle spasms and pain at extremes.  There is good strength and no instability.  Examination of the lower extremities reveals no point tenderness, swelling, or deformity.  Range of motion of the hips, knees, and ankles are full without crepitance, instability, or exacerbation of pain.  Strength is 5/5 throughout, except ankle dorsiflexion on the left is about 4+/5.  No redness, abrasions, or lesions on extremities  Gross sensation intact in the extremities.  Deep tendon reflexes 1+ bilateral. Clonus negative.  Affect normal.  Alert and oriented ×3.  Coordination normal.  Midline lumbar incision is very well-healed.    Imaging studies:  We reviewed x-rays of the lumbar spine from January 21, 2025 an MRI of the lumbar spine from March 29, 2025 was reviewed.    Assessment:  59-year-old gentleman here for new patient evaluation of left lumbosacral back pain and numbness in his lower extremities bilaterally.  He had what looks like an L2-3, L3-4, L4-5 laminectomy with L3, L4 and L5 instrumented fusion with Dr. Moya in 2022.  He did well for about 2 months or so after that surgery and now he is having left lumbosacral back pain around to his left lateral hip.  He is describing numbness in his left  medial calf to the top of his left foot, he is having pain in his left lateral calf and ankle.  He is not having any issues in the upper part of his left leg.  He is describing numbness in the medial surface of his right foot and and bottom of his right foot.  He is not having any significant central low back pain.  He has tried physical therapy recently, both traditional and aqua therapy and nothing has helped.  He does have a history of epidural injections but that was prior to his surgery in 2022.  This is severely inhibiting his bodily function.    We have reviewed tests, x-rays, MRI.  Dr. Moraes is spoken personally to Orion about this patient, this is an exacerbation of a chronic problem that is severely inhibiting his bodily function, we did consider and discuss surgery today.    For complete plan and/or surgical details, please refer to Dr. Moraes's portion of this split dictation.    -Doroteo Ramires PA-C    In a face-to-face encounter, I performed a history and physical examination, discussed pertinent diagnostic studies if indicated, and discussed diagnosis and management strategies with both the patient and the midlevel provider.  I reviewed the midlevel's note and agree with the documented findings and plan of care.    Patient with back pain and bilateral leg symptoms described above.  He had an L3-4 L4-5 laminectomy fusion in 2022 and developed the symptoms a couple months afterwards.  He is getting progressively worse to the point now where he is having difficulty with ADLs and he is severely inhibited with bodily function.  He just wants to get this fixed.  He just failed physical therapy.  He has not had injections this time around but he did have them before his first surgery without much success at all.  He has no bowel or bladder changes.  On exam I do not get any reproduction of symptoms with palpation or range of motion of his hip but he does complain of a lot of lateral hip pain and  buttock pain and but I do not think this is a hip issue.  I think this is radiculopathy.  His symptoms do to go down to his feet on the left side and not as far on the right side.  X-rays and MRI were reviewed and the patient does have a scoliosis above and below his prior fusion.  He has moderate central stenosis from epidural lipomatosis at L1-2.  At L2-3 he has some persistent moderate central stenosis and some right foraminal stenosis.  I do not see much stenosis at L3-4 at all.  At L4-5 I see bilateral foraminal stenosis and at L5-S1 I see bilateral foraminal stenosis and some central stenosis from epidural lipomatosis.  This patient has failed all nonsurgical treatment and wants to proceed with surgical invention.  I discussed with him that probably a T10 to pelvis type operation is the best way to go given how he is doing with the operation he already has had.  I discussed this case personally with Dr. Rivera and he is willing to take over care of this patient so we will get the patient on to see him and they can continue forward with surgical planning and intervention.    Jared Moraes MD  Orthopedic surgery

## 2025-04-05 DIAGNOSIS — I10 HYPERTENSION, ESSENTIAL: ICD-10-CM

## 2025-04-06 ENCOUNTER — APPOINTMENT (OUTPATIENT)
Dept: RADIOLOGY | Facility: HOSPITAL | Age: 60
End: 2025-04-06
Payer: COMMERCIAL

## 2025-04-07 RX ORDER — CHLORTHALIDONE 25 MG/1
25 TABLET ORAL DAILY
Qty: 90 TABLET | Refills: 0 | Status: SHIPPED | OUTPATIENT
Start: 2025-04-07 | End: 2025-07-06

## 2025-04-08 ENCOUNTER — APPOINTMENT (OUTPATIENT)
Dept: ORTHOPEDIC SURGERY | Facility: CLINIC | Age: 60
End: 2025-04-08
Payer: COMMERCIAL

## 2025-04-10 ENCOUNTER — APPOINTMENT (OUTPATIENT)
Dept: ORTHOPEDIC SURGERY | Facility: CLINIC | Age: 60
End: 2025-04-10
Payer: COMMERCIAL

## 2025-04-25 DIAGNOSIS — I10 HYPERTENSION, ESSENTIAL: ICD-10-CM

## 2025-04-28 RX ORDER — LOSARTAN POTASSIUM 100 MG/1
100 TABLET ORAL DAILY
Qty: 90 TABLET | Refills: 0 | Status: SHIPPED | OUTPATIENT
Start: 2025-04-28 | End: 2026-04-28

## 2025-05-03 DIAGNOSIS — F41.9 ANXIETY DISORDER, UNSPECIFIED TYPE: ICD-10-CM

## 2025-05-03 DIAGNOSIS — F41.1 GENERALIZED ANXIETY DISORDER: ICD-10-CM

## 2025-05-06 RX ORDER — BUPROPION HYDROCHLORIDE 300 MG/1
TABLET ORAL
Qty: 90 TABLET | Refills: 0 | Status: SHIPPED | OUTPATIENT
Start: 2025-05-06

## 2025-05-15 ENCOUNTER — OFFICE VISIT (OUTPATIENT)
Facility: CLINIC | Age: 60
End: 2025-05-15
Payer: COMMERCIAL

## 2025-05-15 VITALS
BODY MASS INDEX: 31.28 KG/M2 | HEIGHT: 68 IN | HEART RATE: 79 BPM | SYSTOLIC BLOOD PRESSURE: 134 MMHG | TEMPERATURE: 98 F | DIASTOLIC BLOOD PRESSURE: 88 MMHG | WEIGHT: 206.4 LBS

## 2025-05-15 DIAGNOSIS — M54.16 LUMBAR RADICULOPATHY: ICD-10-CM

## 2025-05-15 DIAGNOSIS — M48.061 DEGENERATIVE LUMBAR SPINAL STENOSIS: ICD-10-CM

## 2025-05-15 DIAGNOSIS — E88.2 EPIDURAL LIPOMATOSIS: ICD-10-CM

## 2025-05-15 DIAGNOSIS — M41.50 DEGENERATIVE SCOLIOSIS IN ADULT PATIENT: Primary | ICD-10-CM

## 2025-05-15 DIAGNOSIS — S32.009K PSEUDOARTHROSIS OF LUMBAR SPINE: ICD-10-CM

## 2025-05-15 PROCEDURE — 3075F SYST BP GE 130 - 139MM HG: CPT | Performed by: NEUROLOGICAL SURGERY

## 2025-05-15 PROCEDURE — 3008F BODY MASS INDEX DOCD: CPT | Performed by: NEUROLOGICAL SURGERY

## 2025-05-15 PROCEDURE — 99214 OFFICE O/P EST MOD 30 MIN: CPT | Performed by: NEUROLOGICAL SURGERY

## 2025-05-15 PROCEDURE — 3079F DIAST BP 80-89 MM HG: CPT | Performed by: NEUROLOGICAL SURGERY

## 2025-05-15 ASSESSMENT — PAIN SCALES - GENERAL: PAINLEVEL_OUTOF10: 7

## 2025-05-15 ASSESSMENT — PATIENT HEALTH QUESTIONNAIRE - PHQ9
2. FEELING DOWN, DEPRESSED OR HOPELESS: NOT AT ALL
SUM OF ALL RESPONSES TO PHQ9 QUESTIONS 1 & 2: 0
1. LITTLE INTEREST OR PLEASURE IN DOING THINGS: NOT AT ALL

## 2025-05-22 ENCOUNTER — HOSPITAL ENCOUNTER (OUTPATIENT)
Dept: RADIOLOGY | Facility: HOSPITAL | Age: 60
Discharge: HOME | End: 2025-05-22
Payer: COMMERCIAL

## 2025-05-22 DIAGNOSIS — S32.009K PSEUDOARTHROSIS OF LUMBAR SPINE: ICD-10-CM

## 2025-05-22 DIAGNOSIS — M41.50 DEGENERATIVE SCOLIOSIS IN ADULT PATIENT: ICD-10-CM

## 2025-05-22 PROCEDURE — 72082 X-RAY EXAM ENTIRE SPI 2/3 VW: CPT

## 2025-05-22 NOTE — PROGRESS NOTES
[facility] [place]  Department of Neurological Surgery  New Patient Visit    History of Present Illness:  Kvng June is a 60 y.o. year old male who presents to the spine clinic with a chief complaint of severe low back pain.  He states that this is always present, but is much worse with standing and walking.  He also has radiating pain down the posterior aspect of both legs.  He has numbness over the medial aspect of his left calf as well as the dorsal and medial aspect of both feet.  He previously underwent a L2-S1 decompression and L3-5 posterolateral fusion in 2022.  Since then, he has undergone physical therapy.  He has not had any injections since his surgery in .    Prior spine surgeries: Yes  Prior physical therapy: Yes  Prior spinal injections: Yes    Diabetic: No  Osteoporosis: No  BMI: Body mass index is 31.38 kg/m².     systems reviewed and negative other than what is listed in the history of present illness    Problem List[1]  Medical History[2]  Surgical History[3]  Social History     Tobacco Use    Smoking status: Former     Current packs/day: 0.00     Types: Cigarettes     Quit date:      Years since quittin.3    Smokeless tobacco: Never   Substance Use Topics    Alcohol use: Yes     Alcohol/week: 10.0 standard drinks of alcohol     Types: 10 Shots of liquor per week     family history includes Hypertension in his mother.  Current Medications[4]  Allergies[5]    Physical Examination  General: well developed, awake/alert/oriented x3, no distress, alert and cooperative  Head/Neck: neck Supple, no apparent injury  ENMT: mucous membranes moist, no apparent injury, no lesions seen  Cardiovascular: no pitting edema, no JVD  Respiratory/Thorax: Normal breath sounds with good chest expansion, thorax symmetric  Skin: warm and dry, no lesions, no rashes    Neurological/Musculoskeletal:    - Posture: Lumbar levoscoliosis    - Range of motion: Reduced lumbar range  of motion    - Muscle Bulk: normal and symmetric in all extremities    - Tenderness to palpation throughout the lumbar spine    - Motor Strength: 5/5 throughout all extremities    - Sensation: Decreased over the medial aspect of the left calf as well as the dorsal and medial aspect of both feet      Results  I personally reviewed and interpreted the imaging results, which included x-rays of the lumbar spine performed on January 21, 2025.  These show a levoscoliosis with the apex at the L2-3 disc space.  There is no dynamic instability on flexion or extension views.  The pelvic incidence is 50 degrees, lumbar lordosis 25 degrees and L1 pelvic angle 2 degrees.    I also reviewed and interpreted a CT of the abdomen and pelvis performed on January 25, 2024.  This shows evidence of left L2 hemilaminectomy as well as L3-5 laminectomies.  There is vacuum phenomena within the L1-2, L2-3, L3-4, L4-5 and L5-S1 disc spaces.  There is haloing around the L5 screws bilaterally.  The right L5 pedicle screw is fractured.    I also reviewed and interpreted an MRI of the lumbar spine performed on March 29, 2025.  This shows epidural lipomatosis most severe at L1-2 and L5-S1.  There is moderate to severe central stenosis at L1 to, moderate central stenosis at L2-3, and severe central stenosis at L5-S1.  There is severe foraminal stenosis on the right at L2-3, on the left at L4-5, and bilaterally at L5-S1.    Assessment and Plan:  Kvng June is a 60 y.o. year old male who presents to the spine clinic with a chief complaint of severe low back pain.  He states that this is always present, but is much worse with standing and walking.  He also has radiating pain down the posterior aspect of both legs.  He has numbness over the medial aspect of his left calf as well as the dorsal and medial aspect of both feet.  He previously underwent a L2-S1 decompression and L3-5 posterolateral fusion in October 2022.  Since then, he has undergone  physical therapy.  He has not had any injections since his surgery in 2022.  His x-rays show a levoscoliosis with the apex at the L2-3 disc space.  There is no dynamic instability on flexion or extension views.  The pelvic incidence is 50 degrees, lumbar lordosis 25 degrees and L1 pelvic angle 2 degrees.  His CT shows evidence of left L2 hemilaminectomy as well as L3-5 laminectomies.  There is vacuum phenomena within the L1-2, L2-3, L3-4, L4-5 and L5-S1 disc spaces.  There is haloing around the L5 screws bilaterally.  The right L5 pedicle screw is fractured.  His MRI shows epidural lipomatosis most severe at L1-2 and L5-S1.  There is moderate to severe central stenosis at L1 to, moderate central stenosis at L2-3, and severe central stenosis at L5-S1.  There is severe foraminal stenosis on the right at L2-3, on the left at L4-5, and bilaterally at L5-S1.  I had a lengthy discussion with the patient regarding his condition and treatment options.  I discussed both further conservative care as well as different surgical approaches.  At this time, I am recommending a T10 to pelvis revision/extension fusion.  This will include laminectomies at L1-2, L2-3 and L5-S1, interbody fusion at L2-3, L4-5 and L5-S1, and bilateral sacroiliac joint fusion. I went over the risks associated with surgery, including infection, bleeding, neurologic injury, spinal fluid leak, and the need for further procedures. All of the patient's questions were answered and they have elected to proceed with surgery.  Prior to surgery, I am going to get EOS films to get a better sense of his global spinal alignment.  I am also ordering a CT of the lumbar spine for intraoperative robotic navigation.        I have reviewed all prior documentation and reviewed the electronic medical record since admission. I have personally have reviewed all advanced imaging not just the reports and used my interpretation as documented as the relevant findings. I have  reviewed the risks and benefits of all treatment recommendations listed in this note with the patient and family.       The above clinical summary has been dictated with voice recognition software. It has not been proofread for grammatical errors, typographical mistakes, or other semantic inconsistencies.    Thank you for visiting our office today. It was our pleasure to take part in your healthcare.     Do not hesitate to call with any questions regarding your plan of care after leaving at [phone] M-F 8am-4pm.     To clinicians, thank you very much for this kind referral. It is a privilege to partner with you in the care of your patients. My office would be delighted to assist you with any further consultations or with questions regarding the plan of care outlined. Do not hesitate to call the office or contact me directly.       Sincerely,      Femi Rivera MD PhD  Attending Neurosurgeon  [facility]   of Neurological Surgery  [facility]    [address]  [address] [place] [address]  [address] [place], [place] [address]    Office: [phone]  Fax: [phone]           [1]   Patient Active Problem List  Diagnosis    Anxiety disorder    Cervical radiculitis    CMC arthritis    Contact dermatitis    Epidural lipomatosis    Erectile dysfunction    Hypertension, essential    Idiopathic cervical spinal stenosis    S/P lumbar spinal fusion    Sleep apnea    Lumbar foraminal stenosis    Chronic left-sided low back pain without sciatica    Atrial fibrillation (Multi)    Bloody diarrhea    Palpitations    Spondylolisthesis of lumbar region    Lumbar radiculopathy    Spinal stenosis of lumbar region with neurogenic claudication    Overweight    Difficulty walking    Epistaxis    Ganglion    Herpes  simplex    Hyperhidrosis of feet    Paronychia of finger of left hand    SI (sacroiliac) joint dysfunction    Bilateral hip pain    Abnormal gait    Iritis    Drusen of both optic discs    Vitreous degeneration of right eye   [2]   Past Medical History:  Diagnosis Date    Arthritis     Depression     Hypertension     Personal history of other diseases of the circulatory system 03/30/2022    History of hypertension   [3]   Past Surgical History:  Procedure Laterality Date    BACK SURGERY N/A     OTHER SURGICAL HISTORY  03/30/2022    Appendectomy    US GUIDED NEEDLE LIVER BIOPSY  05/18/2020    US GUIDED NEEDLE LIVER BIOPSY 5/18/2020 [facility]   [4]   Current Outpatient Medications:     buPROPion XL (Wellbutrin XL) 150 mg 24 hr tablet, TAKE 1 TABLET BY MOUTH ONCE IN THE MORNING. DO NOT CRUSH, CHEW OR SPLIT TABLETS., Disp: 90 tablet, Rfl: 0    buPROPion XL (Wellbutrin XL) 300 mg 24 hr tablet, TAKE 1 TABLET BY MOUTH ONCE DAILY IN THE MORNING. DO NOT CRUSH, CHEW OR SPLIT TABLETS., Disp: 90 tablet, Rfl: 0    chlorthalidone (Hygroton) 25 mg tablet, take 1 tablet (25 mg) by mouth once daily, Disp: 90 tablet, Rfl: 0    famciclovir (Famvir) 250 mg tablet, TAKE ONE TABLET BY MOUTH TWO TIMES A DAY, Disp: 180 tablet, Rfl: 0    fexofenadine (Allegra) 180 mg tablet, Take 1 tablet (180 mg) by mouth once daily., Disp: , Rfl:     flaxseed oiL 1,000 mg capsule, Take 1 capsule (1,000 mg) by mouth 2 times a day., Disp: , Rfl:     flecainide (Tambocor) 100 mg tablet, Take 1 tablet (100 mg) by mouth 2 times a day., Disp: 180 tablet, Rfl: 3    gabapentin (Neurontin) 300 mg capsule, Take by mouth., Disp: , Rfl:     glucosamine-chondroitin 500-400 mg tablet, Take 1 tablet by mouth 3 times a day., Disp: , Rfl:     losartan (Cozaar) 100 mg tablet, take 1 tablet (100 mg) by mouth once daily, Disp: 90 tablet, Rfl: 0    metoprolol tartrate (Lopressor) 25 mg tablet, TAKE ONE TABLET BY MOUTH TWO TIMES A DAY, Disp: 180 tablet, Rfl: 0     "multivitamin tablet, Take 1 tablet by mouth once daily., Disp: , Rfl:     ondansetron (Zofran) 4 mg tablet, Take 1 tablet (4 mg) by mouth every 8 hours if needed for nausea or vomiting., Disp: 30 tablet, Rfl: 0    pantoprazole (ProtoNix) 40 mg EC tablet, TAKE 1 TABLET BY MOUTH ONCE DAILY IN THE MORNING BEFORE A MEAL. DO NOT CRUSH, CHEW OR SPLIT TABLETS., Disp: 90 tablet, Rfl: 0    prednisoLONE acetate (Pred-Forte) 1 % ophthalmic suspension, Administer 1 drop into the left eye 4 times a day., Disp: 5 mL, Rfl: 0    sildenafil (Viagra) 100 mg tablet, TAKE 1 TABLET BY MOUTH DAILY 1 HOUR BEFORE NEEDED, Disp: 30 tablet, Rfl: 0    turmeric-turmeric root extract 450-50 mg capsule, Take by mouth., Disp: , Rfl:   [5]   Allergies  Allergen Reactions    Amlodipine Other     stomach cramps    Venlafaxine Other     Induces Irregular heart beat. \"Makes me mean\"    Induces Irregular heart beat     "

## 2025-05-23 DIAGNOSIS — M48.061 DEGENERATIVE LUMBAR SPINAL STENOSIS: ICD-10-CM

## 2025-05-23 DIAGNOSIS — E88.2 EPIDURAL LIPOMATOSIS: ICD-10-CM

## 2025-05-23 DIAGNOSIS — M54.16 LUMBAR RADICULOPATHY: ICD-10-CM

## 2025-05-23 DIAGNOSIS — S32.009K PSEUDOARTHROSIS OF LUMBAR SPINE: ICD-10-CM

## 2025-05-23 DIAGNOSIS — M41.50 DEGENERATIVE SCOLIOSIS IN ADULT PATIENT: Primary | ICD-10-CM

## 2025-05-27 ENCOUNTER — HOSPITAL ENCOUNTER (OUTPATIENT)
Dept: RADIOLOGY | Facility: CLINIC | Age: 60
Discharge: HOME | End: 2025-05-27
Payer: COMMERCIAL

## 2025-05-27 ENCOUNTER — OFFICE VISIT (OUTPATIENT)
Dept: PRIMARY CARE | Facility: CLINIC | Age: 60
End: 2025-05-27
Payer: COMMERCIAL

## 2025-05-27 ENCOUNTER — HOSPITAL ENCOUNTER (OUTPATIENT)
Dept: RADIOLOGY | Facility: HOSPITAL | Age: 60
Discharge: HOME | End: 2025-05-27
Payer: COMMERCIAL

## 2025-05-27 VITALS
OXYGEN SATURATION: 95 % | TEMPERATURE: 98.8 F | WEIGHT: 209.5 LBS | DIASTOLIC BLOOD PRESSURE: 91 MMHG | HEART RATE: 79 BPM | RESPIRATION RATE: 16 BRPM | BODY MASS INDEX: 31.85 KG/M2 | SYSTOLIC BLOOD PRESSURE: 146 MMHG

## 2025-05-27 DIAGNOSIS — M41.50 DEGENERATIVE SCOLIOSIS IN ADULT PATIENT: ICD-10-CM

## 2025-05-27 DIAGNOSIS — Z12.2 SCREENING FOR LUNG CANCER: ICD-10-CM

## 2025-05-27 DIAGNOSIS — L57.8 SUN-DAMAGED SKIN: ICD-10-CM

## 2025-05-27 DIAGNOSIS — S32.009K PSEUDOARTHROSIS OF LUMBAR SPINE: ICD-10-CM

## 2025-05-27 DIAGNOSIS — R05.8 PRODUCTIVE COUGH: Primary | ICD-10-CM

## 2025-05-27 DIAGNOSIS — I10 HYPERTENSION, ESSENTIAL: ICD-10-CM

## 2025-05-27 DIAGNOSIS — N52.9 ERECTILE DYSFUNCTION, UNSPECIFIED ERECTILE DYSFUNCTION TYPE: ICD-10-CM

## 2025-05-27 DIAGNOSIS — I48.91 ATRIAL FIBRILLATION, UNSPECIFIED TYPE (MULTI): ICD-10-CM

## 2025-05-27 DIAGNOSIS — R05.8 PRODUCTIVE COUGH: ICD-10-CM

## 2025-05-27 PROBLEM — M48.061 DEGENERATIVE LUMBAR SPINAL STENOSIS: Status: ACTIVE | Noted: 2025-05-23

## 2025-05-27 PROCEDURE — 71046 X-RAY EXAM CHEST 2 VIEWS: CPT

## 2025-05-27 PROCEDURE — 72131 CT LUMBAR SPINE W/O DYE: CPT

## 2025-05-27 PROCEDURE — 99214 OFFICE O/P EST MOD 30 MIN: CPT

## 2025-05-27 PROCEDURE — 71046 X-RAY EXAM CHEST 2 VIEWS: CPT | Performed by: STUDENT IN AN ORGANIZED HEALTH CARE EDUCATION/TRAINING PROGRAM

## 2025-05-27 PROCEDURE — 3080F DIAST BP >= 90 MM HG: CPT

## 2025-05-27 PROCEDURE — 3077F SYST BP >= 140 MM HG: CPT

## 2025-05-27 RX ORDER — BENZONATATE 100 MG/1
100 CAPSULE ORAL 3 TIMES DAILY PRN
Qty: 42 CAPSULE | Refills: 0 | Status: SHIPPED | OUTPATIENT
Start: 2025-05-27 | End: 2025-06-26

## 2025-05-27 RX ORDER — LOSARTAN POTASSIUM 100 MG/1
100 TABLET ORAL DAILY
Qty: 90 TABLET | Refills: 1 | Status: SHIPPED | OUTPATIENT
Start: 2025-05-27 | End: 2026-05-27

## 2025-05-27 RX ORDER — TADALAFIL 10 MG/1
10 TABLET ORAL DAILY PRN
Qty: 12 TABLET | Refills: 3 | Status: CANCELLED | OUTPATIENT
Start: 2025-05-27 | End: 2026-05-27

## 2025-05-27 RX ORDER — CHLORTHALIDONE 25 MG/1
25 TABLET ORAL DAILY
Qty: 90 TABLET | Refills: 1 | Status: SHIPPED | OUTPATIENT
Start: 2025-05-27 | End: 2025-08-25

## 2025-05-27 RX ORDER — TADALAFIL 5 MG/1
5 TABLET ORAL DAILY PRN
Qty: 12 TABLET | Refills: 3 | Status: SHIPPED | OUTPATIENT
Start: 2025-05-27 | End: 2025-06-26

## 2025-05-27 RX ORDER — IPRATROPIUM BROMIDE 21 UG/1
2 SPRAY, METERED NASAL EVERY 12 HOURS
Qty: 30 ML | Refills: 1 | Status: SHIPPED | OUTPATIENT
Start: 2025-05-27 | End: 2026-05-27

## 2025-05-27 RX ORDER — METOPROLOL TARTRATE 25 MG/1
25 TABLET, FILM COATED ORAL 2 TIMES DAILY
Qty: 180 TABLET | Refills: 1 | Status: SHIPPED | OUTPATIENT
Start: 2025-05-27

## 2025-05-27 NOTE — ASSESSMENT & PLAN NOTE
Patient would like to switch from Viagra to Cialis for erectile dysfunction.  In the past he has had better success with Cialis.  Prescription low-dose Cialis sent to pharmacy.  Will increase dose as needed.  Orders:    tadalafil (Cialis) 5 mg tablet; Take 1 tablet (5 mg) by mouth once daily as needed for erectile dysfunction.

## 2025-05-27 NOTE — PROGRESS NOTES
Subjective   Kvng June is a 60 y.o. male who presents for Hypertension and Cough (Pt here today to F/U for ongoing cough and BP).    Patient presents with multiple acute concerns today as well as to follow-up on blood pressure.  He is taking losartan, metoprolol, and chlorthalidone for blood pressure and since being on current regimen his blood pressures are at goal typically 120-130 over 80s on home readings.  Denies any side effects.  He also follows with cardiology for his blood pressure and A-fib.    Patient reports that he has had a productive cough since last Friday and some chest congestion.  Denies any shortness of breath or chest pain.  There is some rib muscle soreness from coughing.  Denies any fevers, chills, nausea, vomiting, abdominal pain.  He has had some nasal congestion as well.    Patient would like to switch from Viagra to Cialis for his erectile dysfunction.  He reports having better success with Cialis in the past and does not like the side effects that he is having gradually.    Patient also reports that he has noted some areas of sun damage on his scalp.  He is wondering what should be done about this and if he needs to see dermatology.        Visit Vitals  BP (!) 146/91 (BP Location: Left arm, Patient Position: Sitting)   Pulse 79   Temp 37.1 °C (98.8 °F) (Temporal)   Resp 16      Objective   Physical Exam  Vitals reviewed.   Constitutional:       General: He is not in acute distress.     Appearance: Normal appearance. He is not toxic-appearing.   HENT:      Head: Normocephalic and atraumatic.      Nose: Nose normal.   Eyes:      Extraocular Movements: Extraocular movements intact.   Cardiovascular:      Rate and Rhythm: Normal rate and regular rhythm.      Heart sounds: No murmur heard.     No friction rub. No gallop.   Pulmonary:      Effort: Pulmonary effort is normal. No respiratory distress.      Breath sounds: Normal breath sounds. No wheezing, rhonchi or rales.   Skin:      General: Skin is warm and dry.      Findings: Lesion (Dry scaly spots on scalp that may be suggestive of actinic keratosis) present.   Neurological:      General: No focal deficit present.      Mental Status: He is alert.   Psychiatric:         Mood and Affect: Mood normal.         Behavior: Behavior normal.            Assessment/Plan      Assessment & Plan  Hypertension, essential  At blood pressure at goal with current medication regimen.  Will provide refills at this time.  He forgot to take his medications this morning which explains why in office blood pressure is slightly elevated.  Home readings reviewed and typically 120-130s over 80s.  Will recheck basic labs as seen below as well.  Orders:    losartan (Cozaar) 100 mg tablet; Take 1 tablet (100 mg) by mouth once daily.    metoprolol tartrate (Lopressor) 25 mg tablet; Take 1 tablet (25 mg) by mouth 2 times a day.    chlorthalidone (Hygroton) 25 mg tablet; Take 1 tablet (25 mg) by mouth once daily.    CBC; Future    Comprehensive metabolic panel; Future    Lipid panel; Future    Productive cough  Patient presents with productive cough and chest congestion since last Friday.  Denies any shortness of breath and lungs are clear to auscultation bilaterally.  Using shared decision making we will get chest x-ray to investigate possible underlying pneumonia.  At that time antibiotics will be prescribed if indicated.  Recommend in the meantime he do supportive care including Mucinex, Atrovent nasal spray, Tessalon Perles  Orders:    XR chest 2 views; Future    benzonatate (Tessalon) 100 mg capsule; Take 1 capsule (100 mg) by mouth 3 times a day as needed for cough. Do not crush or chew.    ipratropium (Atrovent) 21 mcg (0.03 %) nasal spray; Administer 2 sprays into each nostril every 12 hours.    Erectile dysfunction, unspecified erectile dysfunction type  Patient would like to switch from Viagra to Cialis for erectile dysfunction.  In the past he has had better  "success with Cialis.  Prescription low-dose Cialis sent to pharmacy.  Will increase dose as needed.  Orders:    tadalafil (Cialis) 5 mg tablet; Take 1 tablet (5 mg) by mouth once daily as needed for erectile dysfunction.    Screening for lung cancer  Patient due for repeat lung cancer screening.  This was done approximately 1 year ago and had some nodules that were likely benign, however we will continue to monitor.  Orders:    CT lung screening low dose; Future    Sun-damaged skin  Patient has areas of sun damage skin on his scalp.  The sun exposed area and could represent actinic keratoses.  Recommend patient return for follow-up or follow-up with dermatology as he would benefit from annual skin checks as well as possible cryotherapy for these lesions.  Orders:    Referral to Dermatology    Atrial fibrillation, unspecified type (Multi)  Metoprolol refill for blood pressure today.  This is also taken for his A-fib and follows with cardiology.  Orders:    metoprolol tartrate (Lopressor) 25 mg tablet; Take 1 tablet (25 mg) by mouth 2 times a day.           This note was partially created using voice recognition software and is inherently subject to errors including those of syntax and \"sound-alike\" substitutions which may escape proofreading. In such instances, original meaning may be extrapolated by contextual derivation.      "

## 2025-05-27 NOTE — ASSESSMENT & PLAN NOTE
At blood pressure at goal with current medication regimen.  Will provide refills at this time.  He forgot to take his medications this morning which explains why in office blood pressure is slightly elevated.  Home readings reviewed and typically 120-130s over 80s.  Will recheck basic labs as seen below as well.  Orders:    losartan (Cozaar) 100 mg tablet; Take 1 tablet (100 mg) by mouth once daily.    metoprolol tartrate (Lopressor) 25 mg tablet; Take 1 tablet (25 mg) by mouth 2 times a day.    chlorthalidone (Hygroton) 25 mg tablet; Take 1 tablet (25 mg) by mouth once daily.    CBC; Future    Comprehensive metabolic panel; Future    Lipid panel; Future

## 2025-05-27 NOTE — ASSESSMENT & PLAN NOTE
Metoprolol refill for blood pressure today.  This is also taken for his A-fib and follows with cardiology.  Orders:    metoprolol tartrate (Lopressor) 25 mg tablet; Take 1 tablet (25 mg) by mouth 2 times a day.

## 2025-06-06 PROBLEM — M79.646 THUMB PAIN: Status: ACTIVE | Noted: 2025-06-06

## 2025-06-06 PROBLEM — H20.9 IRITIS: Status: RESOLVED | Noted: 2024-10-25 | Resolved: 2025-06-06

## 2025-06-06 PROBLEM — M53.3 SI (SACROILIAC) JOINT DYSFUNCTION: Status: RESOLVED | Noted: 2023-10-24 | Resolved: 2025-06-06

## 2025-06-06 PROBLEM — E66.811 CLASS 1 OBESITY WITH BODY MASS INDEX (BMI) OF 31.0 TO 31.9 IN ADULT: Status: ACTIVE | Noted: 2023-10-24

## 2025-06-06 PROBLEM — M19.049 ARTHROPATHY OF HAND: Status: ACTIVE | Noted: 2023-02-23

## 2025-06-10 DIAGNOSIS — F41.1 GENERALIZED ANXIETY DISORDER: ICD-10-CM

## 2025-06-10 DIAGNOSIS — F41.9 ANXIETY DISORDER, UNSPECIFIED TYPE: ICD-10-CM

## 2025-06-10 RX ORDER — BUPROPION HYDROCHLORIDE 150 MG/1
TABLET ORAL
Qty: 90 TABLET | Refills: 0 | Status: SHIPPED | OUTPATIENT
Start: 2025-06-10

## 2025-06-11 RX ORDER — BUPROPION HYDROCHLORIDE 150 MG/1
TABLET ORAL
Qty: 90 TABLET | Refills: 0 | OUTPATIENT
Start: 2025-06-11

## 2025-06-17 ENCOUNTER — TELEPHONE (OUTPATIENT)
Dept: CARDIOLOGY | Facility: CLINIC | Age: 60
End: 2025-06-17

## 2025-06-17 ENCOUNTER — CLINICAL SUPPORT (OUTPATIENT)
Dept: PREADMISSION TESTING | Facility: HOSPITAL | Age: 60
End: 2025-06-17
Payer: COMMERCIAL

## 2025-06-17 NOTE — CPM/PAT H&P
CPM/PAT Evaluation       Name: Kvng June (Kvng June)  /Age: 1965/60 y.o.     {[facility] Visit Type:25814}      Chief Complaint: ***    HPI    Medical History[1]    Surgical History[2]    Patient Sexual activity questions deferred to the physician.    Family History[3]    Allergies[4]      Kvng June is a 60 y.o. Male scheduled for T10-pelvis revision/extension fusion with L1-2, L2-3, and L5-S1 laminectomies; L2-3, L4-5, and L5-S1 transforaminal lumbar interbody fusion and bilateral sacroiliac joint fusion on 2025 with Dr. Rivera and Dr. Berg       ** ONLY**  Prior to Admission medications    Medication Sig Start Date End Date Taking? Authorizing Provider   benzonatate (Tessalon) 100 mg capsule Take 1 capsule (100 mg) by mouth 3 times a day as needed for cough. Do not crush or chew. 25  Stanley Thurston, DO   buPROPion XL (Wellbutrin XL) 150 mg 24 hr tablet TAKE 1 TABLET BY MOUTH ONCE IN THE MORNING. DO NOT CRUSH, CHEW OR SPLIT TABLETS. 6/10/25   Stanley Thurston, DO   buPROPion XL (Wellbutrin XL) 300 mg 24 hr tablet TAKE 1 TABLET BY MOUTH ONCE DAILY IN THE MORNING. DO NOT CRUSH, CHEW OR SPLIT TABLETS. 25   Stanley Thurston, DO   chlorthalidone (Hygroton) 25 mg tablet Take 1 tablet (25 mg) by mouth once daily. 25  Stanley Thurston, DO   famciclovir (Famvir) 250 mg tablet TAKE ONE TABLET BY MOUTH TWO TIMES A DAY 25   Stanley Thurston, DO   fexofenadine (Allegra) 180 mg tablet Take 1 tablet (180 mg) by mouth once daily.    Historical Provider, MD   flaxseed oiL 1,000 mg capsule Take 1 capsule (1,000 mg) by mouth 2 times a day.    Historical Provider, MD   flecainide (Tambocor) 100 mg tablet Take 1 tablet (100 mg) by mouth 2 times a day. 25  Tracy M Schwab, APRN-CNP   gabapentin (Neurontin) 300 mg capsule Take by mouth. 23   Historical Provider, MD   glucosamine-chondroitin 500-400 mg tablet Take 1 tablet by mouth 3 times a day.     Historical Provider, MD   ipratropium (Atrovent) 21 mcg (0.03 %) nasal spray Administer 2 sprays into each nostril every 12 hours. 5/27/25 5/27/26  Stanley Thurston, DO   losartan (Cozaar) 100 mg tablet Take 1 tablet (100 mg) by mouth once daily. 5/27/25 5/27/26  Stanley Thurston, DO   metoprolol tartrate (Lopressor) 25 mg tablet Take 1 tablet (25 mg) by mouth 2 times a day. 5/27/25   Stanley Thurston DO   multivitamin tablet Take 1 tablet by mouth once daily.    Historical Provider, MD   ondansetron (Zofran) 4 mg tablet Take 1 tablet (4 mg) by mouth every 8 hours if needed for nausea or vomiting.  Patient not taking: Reported on 5/27/2025 1/31/24   Keyur Bowden DO   pantoprazole (ProtoNix) 40 mg EC tablet TAKE 1 TABLET BY MOUTH ONCE DAILY IN THE MORNING BEFORE A MEAL. DO NOT CRUSH, CHEW OR SPLIT TABLETS. 1/28/25   Stanley Thurston DO   prednisoLONE acetate (Pred-Forte) 1 % ophthalmic suspension Administer 1 drop into the left eye 4 times a day. 10/31/24   Sonali Tarango, OD   tadalafil (Cialis) 5 mg tablet Take 1 tablet (5 mg) by mouth once daily as needed for erectile dysfunction. 5/27/25 6/26/25  Stanley Thurston DO   turmeric-turmeric root extract 450-50 mg capsule Take by mouth.    Historical Provider, MD KRYSTIN MCCLELLAND Physical Exam     Airway    Visit Vitals  Smoking Status Former       DASI Risk Score    No data to display       Caprini DVT Assessment    No data to display       Modified Frailty Index    No data to display       [id number] Stroke Risk Points  Current as of just now        1 0 to 9 Points:      No Change          The [id number] risk score (Lip GH, et al. 2009. © 2010 American College [employer] Chest [employer]icians) quantifies the risk of stroke for a patient with atrial fibrillation. For patients without atrial fibrillation or under the age of 18 this score appears as N/A. Higher score values generally indicate higher risk of stroke.          Points Metrics   0 Has Congestive Heart  Failure:  No     Patients with congestive heart failure get 1 point.    Current as of just now   1 Has Hypertension:  Yes     Patients with hypertension get 1 point.    Current as of just now   0 Age:  60     Patients 65 to 74 years old get 1 point, or patients 75 years and older get 2 points.    Current as of just now   0 Has Diabetes:  No     Patients with diabetes get 1 point.    Current as of just now   0 Had Stroke:  No  Had TIA:  No  Had Thromboembolism:  No     Patients who have had a stroke, TIA, or thromboembolism get 2 points.    Current as of just now   0 Has Vascular Disease:  No     Patients with vascular disease get 1 point.    Current as of just now   0 Clinically Relevant Sex:  Male     Patients with a clinically relevant sex of Female get 1 point.    Current as of just now             Revised Cardiac Risk Index    No data to display       Apfel Simplified Score    No data to display       Risk Analysis Index Results This Encounter    No data found in the last 10 encounters.       Stop Bang Score      Flowsheet Row Colonoscopy & EGD from 3/13/2024 in [facility] with Faina Duenas MD   Do you snore loudly? 1 filed at 03/13/2024 1150   Do you often feel tired or fatigued after your sleep? 0 filed at 03/13/2024 1150   Has anyone ever observed you stop breathing in your sleep? 1 filed at 03/13/2024 1150   Do you have or are you being treated for high blood pressure? 1 filed at 03/13/2024 1150   Recent BMI (Calculated) 31.5 filed at 03/13/2024 1150   Is BMI greater than 35 kg/m2? 0=No filed at 03/13/2024 1150   Age older than 50 years old? 1=Yes filed at 03/13/2024 1150   Is your neck circumference greater than 17 inches (Male) or 16 inches (Female)? 1 filed at 03/13/2024 1150   Gender - Male 1=Yes filed at 03/13/2024 1150   STOP-BANG Total Score 6 filed at 03/13/2024 1150          Prodigy: High Risk  Total Score: 21              Prodigy Age Score      Prodigy Gender Score     Prodigy SDB  Score          ARISCAT Score for Postoperative Pulmonary Complications    No data to display       Morris Perioperative Risk for Myocardial Infarction or Cardiac Arrest ([place])    No data to display                                                                                                                PAT nurse screening call        Summary:       Kvng June is a 60 y.o. Male scheduled for T10-pelvis revision/extension fusion with L1-2, L2-3, and L5-S1 laminectomies; L2-3, L4-5, and L5-S1 transforaminal lumbar interbody fusion and bilateral sacroiliac joint fusion on 7/9/2025 with Dr. Rivera and Dr. Berg         TESTING:          Transthoracic Echocardiogram: 10/8/2019:  CONCLUSIONS:   - Exam indication: Nonsustained atrial fibrillation   - The left ventricle is normal in size. Left ventricular systolic function is   normal. EF = 63 ± 5% (2D biplane)   - The right ventricle is mildly dilated. Right ventricular systolic function is   normal.   - The left atrial cavity is mildly dilated.   - The right atrial cavity is mildly dilated.   - There are no significant valvular abnormalities.   - Estimated right ventricular systolic pressure is likely underestimated due to a   weak or incomplete tricuspid regurgitation signal and is, at least, 29 mmHg   consistent with normal pulmonary artery pressures. Estimated right atrial pressure    is 3 mmHg based on IVC assessment.   - The patient has not had a prior CC echocardiographic exam for comparison.         EPS: Afib Ablation: 10/30/2019:  Summary Findings:   BASELINE   1. Clinical arrhythmia procedure outcome: successful   2. Instrumentation was via inferior vena cava, transeptal, and endocardial   approaches.   3. The Bios1DayLaterter: CARTO mapping system was utilized.   4. Sinus node function was normal.   5. Infranodal conduction was normal.   6. Supraventricular arrhythmias   - Ablation of atrial fibrillation was successful.   There were no  intraprocedural complications.         XR Chest 2 Views: 5/27/2025:  IMPRESSION:  No radiographic evidence of acute cardiopulmonary abnormality.        XR EOS Full Body EAM Through Ankle: 5/22/2025:  IMPRESSION:  1. Multilevel spondylolisthesis in the lumbar spine.  2. Lumbar levoconvexity.  3. Surgical and degenerative change without osseous injury evident.  4. Please see ordering clinician notes for any quantitative  measurement of spinal curvature.  MRI Lumbar Spine: 3/29/2025:  IMPRESSION:  Status post posterior decompression from L2 through L5 with fusion  hardware from L3 through L5. There is no fluid collection at the  surgical site.  Multilevel discogenic degenerative changes of the lumbar spine with  hypertrophic facet arthrosis and posterior subluxation of L4, L3, and  L2.  Multilevel bulging disc and epidural lipomatosis contributes to  narrowing of the thecal sac at multiple levels as detailed above.  There is broad-based disc protrusion laterally on the right at L2-3  contributing to moderate to severe neural foraminal narrowing. There  is moderate to severe bilateral neural foraminal narrowing at L5-S1  and L4-5 on the right. There is severe narrowing of the left L4-5  neural foramen.  No acute osseous abnormality allowing for postsurgical artifact.   MACRO:  None        XR Lumbar Spine: 1/21/2025:  IMPRESSION:  Degenerative and postsurgical changes with intact hardware.    MACRO:  None        XR Hips Bilateral with Pelvis: 1/9/2025:  IMPRESSION:  Mild osteoarthritis bilateral hips and sacroiliac joints.        CT Lumbar Spine: 5/27/2025:  IMPRESSION:  Postsurgical changes of posterolateral fusion at L3 through L5. The  right L5 screw is broken, without significant displacement. There is  2-3 mm of lucency surrounding the bilateral L5 screws.  Status post laminectomy at L4.  Degenerative changes as detailed above.  MACRO:  None        CT Lung Screening Low Dose: 10/30/2024:  IMPRESSION:  1. Pulmonary  nodules measuring up to 4 mm as described above, likely  benign. Continued annual screening with low-dose noncontrast chest CT  is recommended.  2. Diffuse hypoattenuation of the hepatic parenchyma suggesting  steatosis. Correlate with hepatic function studies.  LUNG RADS CATEGORY:  Lung Rad: Lung-RADS 2 (Benign Appearance or Indolent Behavior)  Recommendation: Continue annual screening with Low Dose Chest CT in  12 months, recommended as per American College of Radiology  Guidelines Lung-RADS Version 2022.  MACRO:  None.        US Right Upper Quadrant: 1/30/2024:  IMPRESSION:  Possible fatty infiltration of the liver.  Negative gallbladder. No biliary dilation.  Sub visualization of the pancreas.  MACRO:  None.        CT AP: 1/25/2024:  IMPRESSION:  1.  No acute intra-abdominal abnormality.  2. Colonic diverticulosis without evidence of acute diverticulitis.  3. Thick-walled appearance of the urinary bladder, likely related to  underdistention. Clinically exclude superimposed cystitis.        CT Cardiac Scoring: 10/16/2023:  IMPRESSION:  1. Coronary artery calcium score of 0 *.        XR Entire Spine: 7/5/2022:  IMPRESSION:  Moderate multilevel spondylosis at the thoracolumbar spine. Mild  retrolisthesis L2 on L3 and L3 on L4        MRI Cervical Spine: 4/5/2022:            XR Thoracic Spine: 3/29/2022:  IMPRESSION:  Minimal anterolisthesis of C4 on C5 and C5 on C6.  Minimal cervical spondylosis at C4-5 and C5-6 with mild encroachment  on the bilateral neural foramina.  Multilevel degenerative disc disease of the lumbar spine. Minimal to  mild retrolisthesis of multiple levels from L2 through L5, measuring  3-6 mm.  Mild degenerative change of the middle and lower dorsal vertebral  bodies.        ECG 12 Lead: 1/31/2025:  Sinus Rhythm, incomplete RBBB               PROVIDERS/SPECIALIST:          PCP: Stanley Thurston DO (Resident staffed by Rico Spencer DO), LOV 5/27/2025, presents for a follow up for ongoing cough  and hypertension        Neurosurgery: Femi Rivera MD, LOV 5/15/2025, presents with severe low back pain  He states that this is always present, but is much worse with standing and walking.   He previously underwent a L2-S1 decompression and L3-5 posterolateral fusion in October 2022 with Dr. Moya  At this time, I am recommending a T10 to pelvis revision/extension fusion.  This will include laminectomies at L1-2, L2-3 and L5-S1, interbody fusion at L2-3, L4-5 and L5-S1, and bilateral sacroiliac joint fusion   Prior to surgery, I am going to get EOS films to get a better sense of his global spinal alignment. I am also ordering a CT of the lumbar spine for intraoperative robotic navigation.         Orthopaedic Surgery: Jared Moraes MD, referred by Orion Colby PA-C, LOV 4/3/2025, presents with severe low back pain  Left lumbosacral back pain around to his left lateral hip. He is describing numbness in his left medial calf to the top of his left foot, he is having pain in his left lateral calf and ankle.   He does have a history of epidural injections but that was prior to his surgery in 2022. This is severely inhibiting his bodily function   This patient has failed all nonsurgical treatment and wants to proceed with surgical invention. I discussed with him that probably a T10 to pelvis type operation is the best way to go given how he is doing with the operation he already has had. I discussed this case personally with Dr. Rivera and he is willing to take over care of this patient so we will get the patient on to see him         Orthopaedic Surgery: Albino Colby PA-C, LOV 4/1/2025, presents for follow up MRI r/t h/x of severe back pain  Plan: I will have him see Dr. Moraes to see if he recommends an operation and if so something that we could address. If not we will get him to the right specialist to handle it.         Cardiology: Tracy M Schwab, JUAN-CNP, LOV 1/31/2025, presents for annual  exam with h/x pAfib s/p PVI (2019 with Dr. Chow), LEATHA on CPAP, HTN   Well since his last visit. Denies any chest pain, pressure, SOB/WRIGHT, PND, orthopnea, LE edema, palpitations, lightheadedness, dizziness, or syncope.   pAfib s/p PVI (2019, Dr. Chow). Dr hCow took him off flecainide in 2020, started having PVCs so put him back on. Per EP note, restarted for 5% PVC burden. Last Echo 10/8/2019 EF 63%, RV mildly dilated, LA mildly dilated, no significant valvular abnormalities. No c/o Palpitations/PVCs.   Referral to Dr. Nick to discuss possible ILR & anticoagulation.   Follow up in 1 year or sooner as needed         Orthopaedic Surgery: Marlo Taylor MD, LOV 1/9/2025, presents for bilateral hip pain  Lumbar radiculopathy  Spinal stenosis  Asymptomatic mild hip arthrosis  Plan  Medrol Dosepak  Referral to spine team  Follow-up as needed for his hips  Questions answered        Neurosurgery: Rosendo Buckner MD, LOV 3/21/2023, presents s/p lumbar spinal fusion  Recently underwent a L2, S1 Hemilamiectomy and L3-5 Laminectomy with L3-5 Fusion with me on 10/14/2022 and is status post 5 months out from his surgery.   The patient has been doing very well with no neurosurgical concerns. Considering that, Mr. CASTANEDA does not need to follow up with us on a routine basis anymore and should see us only as necessary especially in case he develops new onset worsening pain or any new neurological symptoms         Pain Medicine: Ignacio Nuñez MD, LOV 6/2/2023, presents with left sided low back pain s/p L3-L5 fusion in 2022  He did very well for 2 months after the surgery. However, since late December the patient has been experiencing new onset left-sided low back pain which is different than what he had before. The pain is worse with standing and walking improved with sitting down   I would get an x-ray of his hip to rule out any significant osteoarthritis, if negative I would do left SI joint injection for diagnostic and  therapeutic purposes. Short prescription for hydrocodone was prior to the patient for severe pain         Gastroenterology: Marie Alegria, JUAN-CNP, LOV 4/3/2024, presents for evaluation r/t h/x of rectal bleeding, lower abdominal pain similar to previous diverticulitis from 4 years ago  He underwent a colonoscopy and EGD which were essentially normal other than 2 hyperplastic polyps   He continues to have lower abdominal cramping which does not seem to be associated with Bm's. He will start fiber and Miralax, and Ibgard.   --------------------------------------------------------------------------------------------------  ** ONLY**        Nurse Plan of Action: Reach out for cardiac clearance      Guy Schroeder RN  Preadmission Testing       Assessment and Plan:     {[facility] ASSESSMENT AND PLAN:[id number]}             [1]   Past Medical History:  Diagnosis Date    Abnormal gait     ADD (attention deficit disorder)     Making appointments can be difficult due to losing focus according to pt    Anxiety     Arrhythmia     Paroxysmal A-fib, follows with Tracy M Schwab, APRN-CNP    Arthritis     Cervical radiculitis     Chronic pain disorder     left sided low back pain without sciatica    Depression     Drusen of both optic discs     Elevated PSA     Epidural lipomatosis     Fatty liver     History of diverticulitis 2020    S/p treatment Augmentin    History of palpitations     Hypertension     Lumbar radiculopathy     MORSE (nonalcoholic steatohepatitis)     Personal history of other diseases of the circulatory system 03/30/2022    History of hypertension    Pulmonary nodules     Scoliosis     degenerative scoliosis    SI (sacroiliac) joint dysfunction     Sleep apnea     On PAP    Spinal stenosis     idiopathic cervical and lumbar   [2]   Past Surgical History:  Procedure Laterality Date    ABLATION OF DYSRHYTHMIC FOCUS  2019    PVI    APPENDECTOMY      BACK SURGERY N/A     COLONOSCOPY  2020     "ESOPHAGOGASTRODUODENOSCOPY      OTHER SURGICAL HISTORY  03/30/2022    Appendectomy    OTHER SURGICAL HISTORY  12/30/2022    Left first carpometacarpal arthroplasty with trapezium excision and first metacarpal suspension.    SPINAL FUSION      Lumbar    US GUIDED NEEDLE LIVER BIOPSY  05/18/2020    US GUIDED NEEDLE LIVER BIOPSY 5/18/2020 [facility]   [3]   Family History  Problem Relation Name Age of Onset    Hypertension Mother     [4]   Allergies  Allergen Reactions    Amlodipine Other     stomach cramps    Venlafaxine Other     Induces Irregular heart beat. \"Makes me mean\"    Induces Irregular heart beat     "

## 2025-06-17 NOTE — TELEPHONE ENCOUNTER
----- Message from Tracy Schwab sent at 6/17/2025  3:43 PM EDT -----  Regarding: RE: Recommendations  Cleared for surgery  ----- Message -----  From: Myriam Berg LPN  Sent: 6/17/2025   3:35 PM EDT  To: Tracy M Schwab, APRN-CNP  Subject: RE: Recommendations                              L/S 1/31/25 = Physical Exam:  On exam Mr. Kvng June appears his stated age, is alert and oriented x3, and in no acute distress. His sclera are anicteric and his oropharynx has moist mucous membranes. His neck is supple and without thyromegaly. The JVP is ~5 cm of water above the right atrium. His cardiac exam has regular rhythm, normal S1, S2. No S3/4. There are no murmurs. His lungs are clear to auscultation bilaterally and there is no dullness to percussion. His abdomen is soft, nontender with normoactive bowel sounds. There is no HJR. The extremities are warm and without edema. The skin is dry. There is no rash present. The distal pulses are 2-3+ in all four extremities. His mood and affect are appropriate for todays encounter.      Assessment/Plan:  1. pAfib s/p PVI (2019, Dr. Chow). Dr Chow took him off flecainide in 2020, started having PVCs so put him back on. Per EP note, restarted for 5% PVC burden. Last Echo 10/8/2019 EF 63%, RV mildly dilated, LA mildly dilated, no significant valvular abnormalities. No c/o Palpitations/PVCs. EKG shows Sinus Rhythm with HR 73 bpm with incomplete RBBB, QTCh 417  - c/w Metoprolol tartrate 25mg BID  - c/w Flecainide 100mg Q12hrs  - referral to Dr. Nick to discuss possible ILR & anticoagulation.     2. Hypertension. /80 mmHg today. Discussed importance of good blood pressure control to avoid long-term complications such as heart attack and stroke. Patient is aware that blood pressure goal is less than 130/80.   - continue with current medication regimen.      Follow up in 1 year or sooner as needed  ----- Message -----  From: Tracy M Schwab, APRN-CNP  Sent: 6/17/2025   3:33 PM  EDT  To: Myriam Berg LPN  Subject: FW: Recommendations                              When’s his last visit and anything pertinent?  ----- Message -----  From: Guy Schroeder RN  Sent: 6/17/2025   3:30 PM EDT  To: Tracy M Schwab, JUAN-CNP; Bindu Juarez PA-C  Subject: Recommendations                                  Hi Ms. Schwab,    I am reaching out in regards to a mutual patient of duane June. He is scheduled for a  T10-pelvis revision/extension fusion with L1-2, L2-3, and L5-S1 laminectomies; L2-3, L4-5, and L5-S1 transforaminal lumbar interbody fusion and bilateral sacroiliac joint fusion with Dr. Dutta and Dr. Berg on 7/9/2025 with Pre Admission Testing on 6/24/2025.    In your opinion is he optimized from a cardiac standpoint to proceed with this surgery? Does he need to have any additional testing or need to see you prior? Thanks for your help!

## 2025-06-24 ENCOUNTER — PRE-ADMISSION TESTING (OUTPATIENT)
Dept: PREADMISSION TESTING | Facility: HOSPITAL | Age: 60
End: 2025-06-24
Payer: COMMERCIAL

## 2025-06-24 VITALS
OXYGEN SATURATION: 96 % | HEART RATE: 66 BPM | BODY MASS INDEX: 30.59 KG/M2 | HEIGHT: 69 IN | SYSTOLIC BLOOD PRESSURE: 126 MMHG | TEMPERATURE: 98.3 F | WEIGHT: 206.5 LBS | DIASTOLIC BLOOD PRESSURE: 82 MMHG

## 2025-06-24 DIAGNOSIS — S32.009K PSEUDOARTHROSIS OF LUMBAR SPINE: ICD-10-CM

## 2025-06-24 DIAGNOSIS — E88.2 EPIDURAL LIPOMATOSIS: ICD-10-CM

## 2025-06-24 DIAGNOSIS — M48.061 DEGENERATIVE LUMBAR SPINAL STENOSIS: ICD-10-CM

## 2025-06-24 DIAGNOSIS — M41.50 DEGENERATIVE SCOLIOSIS IN ADULT PATIENT: ICD-10-CM

## 2025-06-24 DIAGNOSIS — Z01.818 PREOPERATIVE EXAMINATION: Primary | ICD-10-CM

## 2025-06-24 LAB
ABO GROUP (TYPE) IN BLOOD: NORMAL
ANION GAP SERPL CALC-SCNC: 14 MMOL/L (ref 10–20)
ANTIBODY SCREEN: NORMAL
BASOPHILS # BLD AUTO: 0.03 X10*3/UL (ref 0–0.1)
BASOPHILS NFR BLD AUTO: 0.5 %
BUN SERPL-MCNC: 27 MG/DL (ref 6–23)
CALCIUM SERPL-MCNC: 10.2 MG/DL (ref 8.6–10.6)
CHLORIDE SERPL-SCNC: 101 MMOL/L (ref 98–107)
CO2 SERPL-SCNC: 27 MMOL/L (ref 21–32)
CREAT SERPL-MCNC: 1.47 MG/DL (ref 0.5–1.3)
EGFRCR SERPLBLD CKD-EPI 2021: 54 ML/MIN/1.73M*2
EOSINOPHIL # BLD AUTO: 0.21 X10*3/UL (ref 0–0.7)
EOSINOPHIL NFR BLD AUTO: 3.3 %
ERYTHROCYTE [DISTWIDTH] IN BLOOD BY AUTOMATED COUNT: 13.1 % (ref 11.5–14.5)
EST. AVERAGE GLUCOSE BLD GHB EST-MCNC: 114 MG/DL
GLUCOSE SERPL-MCNC: 151 MG/DL (ref 74–99)
HBA1C MFR BLD: 5.6 % (ref ?–5.7)
HCT VFR BLD AUTO: 35 % (ref 41–52)
HGB BLD-MCNC: 11.4 G/DL (ref 13.5–17.5)
IMM GRANULOCYTES # BLD AUTO: 0.05 X10*3/UL (ref 0–0.7)
IMM GRANULOCYTES NFR BLD AUTO: 0.8 % (ref 0–0.9)
LYMPHOCYTES # BLD AUTO: 2.03 X10*3/UL (ref 1.2–4.8)
LYMPHOCYTES NFR BLD AUTO: 31.5 %
MCH RBC QN AUTO: 34.1 PG (ref 26–34)
MCHC RBC AUTO-ENTMCNC: 32.6 G/DL (ref 32–36)
MCV RBC AUTO: 105 FL (ref 80–100)
MONOCYTES # BLD AUTO: 0.99 X10*3/UL (ref 0.1–1)
MONOCYTES NFR BLD AUTO: 15.3 %
NEUTROPHILS # BLD AUTO: 3.14 X10*3/UL (ref 1.2–7.7)
NEUTROPHILS NFR BLD AUTO: 48.6 %
NRBC BLD-RTO: 0 /100 WBCS (ref 0–0)
PLATELET # BLD AUTO: 194 X10*3/UL (ref 150–450)
POTASSIUM SERPL-SCNC: 4.3 MMOL/L (ref 3.5–5.3)
PREALB SERPL-MCNC: 29.8 MG/DL (ref 18–40)
RBC # BLD AUTO: 3.34 X10*6/UL (ref 4.5–5.9)
RH FACTOR (ANTIGEN D): NORMAL
SODIUM SERPL-SCNC: 138 MMOL/L (ref 136–145)
WBC # BLD AUTO: 6.5 X10*3/UL (ref 4.4–11.3)

## 2025-06-24 PROCEDURE — 86923 COMPATIBILITY TEST ELECTRIC: CPT

## 2025-06-24 PROCEDURE — 80048 BASIC METABOLIC PNL TOTAL CA: CPT | Performed by: NEUROLOGICAL SURGERY

## 2025-06-24 PROCEDURE — 83036 HEMOGLOBIN GLYCOSYLATED A1C: CPT

## 2025-06-24 PROCEDURE — 99204 OFFICE O/P NEW MOD 45 MIN: CPT

## 2025-06-24 PROCEDURE — 87081 CULTURE SCREEN ONLY: CPT

## 2025-06-24 PROCEDURE — 84134 ASSAY OF PREALBUMIN: CPT | Performed by: NEUROLOGICAL SURGERY

## 2025-06-24 PROCEDURE — 86850 RBC ANTIBODY SCREEN: CPT

## 2025-06-24 PROCEDURE — 86901 BLOOD TYPING SEROLOGIC RH(D): CPT

## 2025-06-24 PROCEDURE — 85025 COMPLETE CBC W/AUTO DIFF WBC: CPT | Performed by: NEUROLOGICAL SURGERY

## 2025-06-24 RX ORDER — CHLORHEXIDINE GLUCONATE 40 MG/ML
SOLUTION TOPICAL DAILY PRN
Qty: 473 ML | Refills: 0 | Status: SHIPPED | OUTPATIENT
Start: 2025-06-24

## 2025-06-24 RX ORDER — CHLORHEXIDINE GLUCONATE ORAL RINSE 1.2 MG/ML
15 SOLUTION DENTAL AS NEEDED
Qty: 120 ML | Refills: 0 | Status: SHIPPED | OUTPATIENT
Start: 2025-06-24 | End: 2025-06-26

## 2025-06-24 RX ORDER — NAPROXEN 500 MG/1
500 TABLET ORAL
COMMUNITY

## 2025-06-24 ASSESSMENT — ENCOUNTER SYMPTOMS
LIMITED RANGE OF MOTION: 0
VOICE CHANGE: 0
ABDOMINAL DISTENTION: 0
RHINORRHEA: 0
DIFFICULTY URINATING: 0
MYALGIAS: 1
ABDOMINAL PAIN: 0
DYSPNEA AT REST: 0
VISUAL CHANGE: 0
NUMBNESS: 0
DOUBLE VISION: 0
POLYDIPSIA: 0
NECK SWELLING: 0
DIARRHEA: 0
BLOOD IN STOOL: 0
ARTHRALGIAS: 0
WEAKNESS: 0
CONSTIPATION: 0
HEMOPTYSIS: 0
PND: 0
NECK STIFFNESS: 0
WHEEZING: 0
DYSPNEA WITH EXERTION: 0
SKIN CHANGES: 0
JOINT SWELLING: 0
SINUS CONGESTION: 0
CHILLS: 0
TROUBLE SWALLOWING: 0
NAUSEA: 0
TREMORS: 0
DYSURIA: 0
EXCESSIVE BLEEDING: 0
NERVOUS/ANXIOUS: 0
CONFUSION: 0
COUGH: 0
FEVER: 0
EYE DISCHARGE: 0
UNEXPECTED WEIGHT CHANGE: 0
VERTIGO: 0
WOUND: 0
SHORTNESS OF BREATH: 0
VOMITING: 0
LIGHT-HEADEDNESS: 0
BRUISES/BLEEDS EASILY: 0
NECK MASS: 0
NECK PAIN: 0
PALPITATIONS: 0

## 2025-06-24 ASSESSMENT — PAIN - FUNCTIONAL ASSESSMENT: PAIN_FUNCTIONAL_ASSESSMENT: 0-10

## 2025-06-24 ASSESSMENT — DUKE ACTIVITY SCORE INDEX (DASI)
CAN YOU DO MODERATE WORK AROUND THE HOUSE LIKE VACUUMING, SWEEPING FLOORS OR CARRYING GROCERIES: NO
CAN YOU DO LIGHT WORK AROUND THE HOUSE LIKE DUSTING OR WASHING DISHES: NO
CAN YOU WALK INDOORS, SUCH AS AROUND YOUR HOUSE: YES
DASI METS SCORE: 4
CAN YOU CLIMB A FLIGHT OF STAIRS OR WALK UP A HILL: YES
TOTAL_SCORE: 10
CAN YOU RUN A SHORT DISTANCE: NO
CAN YOU HAVE SEXUAL RELATIONS: NO
CAN YOU WALK A BLOCK OR TWO ON LEVEL GROUND: NO
CAN YOU TAKE CARE OF YOURSELF (EAT, DRESS, BATHE, OR USE TOILET): YES
CAN YOU DO HEAVY WORK AROUND THE HOUSE LIKE SCRUBBING FLOORS OR LIFTING AND MOVING HEAVY FURNITURE: NO
CAN YOU DO YARD WORK LIKE RAKING LEAVES, WEEDING OR PUSHING A MOWER: NO
CAN YOU PARTICIPATE IN STRENOUS SPORTS LIKE SWIMMING, SINGLES TENNIS, FOOTBALL, BASKETBALL, OR SKIING: NO
CAN YOU PARTICIPATE IN MODERATE RECREATIONAL ACTIVITIES LIKE GOLF, BOWLING, DANCING, DOUBLES TENNIS OR THROWING A BASEBALL OR FOOTBALL: NO

## 2025-06-24 ASSESSMENT — LIFESTYLE VARIABLES: SMOKING_STATUS: NONSMOKER

## 2025-06-24 ASSESSMENT — PAIN SCALES - GENERAL: PAINLEVEL_OUTOF10: 8

## 2025-06-24 NOTE — CPM/PAT H&P
CPM/PAT Evaluation       Name: Kvng June (Kvng June)  /Age: 1965/60 y.o.     Visit Type:   In-Person       Chief Complaint: perioperative evaluation    HPI HPI: 59 y/o male scheduled for T10-pelvis revision/extension fusion with L1-2, L2-3, and L5-S1 laminectomies; L2-3, L4-5, and L5-S1 transforaminal lumbar interbody fusion and bilateral sacroiliac joint fusion  on 2025  secondary to Degenerative scoliosis in adult patient, Pseudoarthrosis of lumbar spine, Epidural lipomatosis, Lumbar radiculopathy, Degenerative lumbar spinal stenosis  with Dr. Femi Rivera & Dr Miguel Berg who referred to CPM.  Presents to CPM today for perioperative risk stratification and optimization. PMHX includes Anxiety, Depression, ADD, HTN, Paroxsymal Atrial Fibrillation, LEATHA (CPAP), Erectile Dysfunction, MORSE, GERD, diverticulosis, Degenerative scoliosis in adult patient, Pseudoarthrosis of lumbar spine, Epidural lipomatosis, Lumbar radiculopathy, Degenerative lumbar spinal stenosis, chornic pain disorder, .    PCP: Rico Spencer DO    Specialists:  Neurosurgery- Femi Rivera MD PhD   Ortho Surg- Jared Moraes MD   Cardiology- Tracy M Schwab, APRN-CNP   GI- JUAN Potter-KENNY             Medical History[1]    Surgical History[2]    Patient Sexual activity questions deferred to the physician.    Family History[3]    Allergies[4]    Prior to Admission medications    Medication Sig Start Date End Date Taking? Authorizing Provider   benzonatate (Tessalon) 100 mg capsule Take 1 capsule (100 mg) by mouth 3 times a day as needed for cough. Do not crush or chew. 25  Stanley Thurston, DO   buPROPion XL (Wellbutrin XL) 150 mg 24 hr tablet TAKE 1 TABLET BY MOUTH ONCE IN THE MORNING. DO NOT CRUSH, CHEW OR SPLIT TABLETS. 6/10/25   Stanley Thurston, DO   buPROPion XL (Wellbutrin XL) 300 mg 24 hr tablet TAKE 1 TABLET BY MOUTH ONCE DAILY IN THE MORNING. DO NOT CRUSH, CHEW OR SPLIT TABLETS.  5/6/25   Stanley Thurston, DO   chlorthalidone (Hygroton) 25 mg tablet Take 1 tablet (25 mg) by mouth once daily. 5/27/25 8/25/25  Stanley Thurston DO   famciclovir (Famvir) 250 mg tablet TAKE ONE TABLET BY MOUTH TWO TIMES A DAY 1/28/25   Stanley Thurston DO   fexofenadine (Allegra) 180 mg tablet Take 1 tablet (180 mg) by mouth once daily.    Historical Provider, MD   flaxseed oiL 1,000 mg capsule Take 1 capsule (1,000 mg) by mouth 2 times a day.    Historical Provider, MD   flecainide (Tambocor) 100 mg tablet Take 1 tablet (100 mg) by mouth 2 times a day. 1/31/25 1/31/26  Tracy M Schwab, APRN-CNP   gabapentin (Neurontin) 300 mg capsule Take by mouth. 4/12/23   Historical Provider, MD   glucosamine-chondroitin 500-400 mg tablet Take 1 tablet by mouth 3 times a day.    Historical Provider, MD   ipratropium (Atrovent) 21 mcg (0.03 %) nasal spray Administer 2 sprays into each nostril every 12 hours. 5/27/25 5/27/26  Stanley Thurston DO   losartan (Cozaar) 100 mg tablet Take 1 tablet (100 mg) by mouth once daily. 5/27/25 5/27/26  Stanley Thurston DO   metoprolol tartrate (Lopressor) 25 mg tablet Take 1 tablet (25 mg) by mouth 2 times a day. 5/27/25   Stanley Thurston, DO   multivitamin tablet Take 1 tablet by mouth once daily.    Historical Provider, MD   ondansetron (Zofran) 4 mg tablet Take 1 tablet (4 mg) by mouth every 8 hours if needed for nausea or vomiting.  Patient not taking: Reported on 5/27/2025 1/31/24   Keyur Bowden, DO   pantoprazole (ProtoNix) 40 mg EC tablet TAKE 1 TABLET BY MOUTH ONCE DAILY IN THE MORNING BEFORE A MEAL. DO NOT CRUSH, CHEW OR SPLIT TABLETS. 1/28/25   Stanley Thurston, DO   prednisoLONE acetate (Pred-Forte) 1 % ophthalmic suspension Administer 1 drop into the left eye 4 times a day. 10/31/24   Sonali Tarango, KADEEM   tadalafil (Cialis) 5 mg tablet Take 1 tablet (5 mg) by mouth once daily as needed for erectile dysfunction. 5/27/25 6/26/25  Stanley Thurston, DO   turmeric-turmeric root extract  450-50 mg capsule Take by mouth.    Historical Provider, MD MCCLELLAND ROS:   Constitutional:    no fever   no chills   no unexpected weight change  Neuro/Psych:    no numbness   no weakness   no light-headedness   no tremors   no confusion   not nervous/anxious   no self-injury   no suicidal ideation  Eyes:    no discharge   no vision loss   no diplopia   no visual disturbance   use of corrective lenses  Ears:    no ear pain   no hearing loss   no vertigo   no tinnitus   no hearing aides  Nose:    no nasal discharge   no sinus congestion   no epistaxis  Mouth:    no dental issues   no mouth pain   no oral bleeding   no mouth lesions  Throat:    no throat pain   no dysphagia   no voice change  Neck:    no neck pain   neck swelling   no neck stiffness   no neck masses  Cardio:    no chest pain   no palpitations   no peripheral edema   no dyspnea   no WRIGHT   no paroxysmal nocturnal dyspnea  Respiratory:    no cough   no wheezing   no hemoptysis   no shortness of breath  Endocrine:    no cold intolerance   no heat intolerance   no polydipsia  GI:    no abdominal distention   no abdominal pain   no constipation   no diarrhea   no nausea   no vomiting   no blood in stool  :    no difficulty urinating   no dysuria   no oliguria   no polyuria  Musculoskeletal:    no arthralgias   myalgias (back pain)   no decreased ROM   no swelling  Hematologic:    does not bruise/bleed easily   no excessive bleeding   no history of blood transfusion   no blood clots  Skin:   no skin changes   no sores/wound   no rash      Physical Exam  Vitals reviewed.   Constitutional:       General: He is not in acute distress.     Appearance: Normal appearance. He is obese. He is not ill-appearing, toxic-appearing or diaphoretic.   HENT:      Head: Normocephalic.      Nose: Nose normal. No congestion or rhinorrhea.      Mouth/Throat:      Mouth: Mucous membranes are moist.      Pharynx: Oropharynx is clear. No oropharyngeal exudate or posterior  "oropharyngeal erythema.   Eyes:      General: No scleral icterus.        Right eye: No discharge.         Left eye: No discharge.      Conjunctiva/sclera: Conjunctivae normal.   Neck:      Vascular: No carotid bruit.   Cardiovascular:      Rate and Rhythm: Normal rate and regular rhythm.      Pulses: Normal pulses.      Heart sounds: Normal heart sounds. No murmur heard.     No friction rub. No gallop.   Pulmonary:      Effort: Pulmonary effort is normal. No respiratory distress.      Breath sounds: Normal breath sounds. No stridor. No wheezing, rhonchi or rales.   Chest:      Chest wall: No tenderness.   Musculoskeletal:         General: No swelling or tenderness.      Cervical back: Normal range of motion. No rigidity or tenderness.   Skin:     Findings: Lesion (multiple small skin lesions on face/scalp. no drainage or sign of infection.) present.   Neurological:      General: No focal deficit present.      Mental Status: He is alert.   Psychiatric:         Mood and Affect: Mood normal.         Behavior: Behavior normal.         Thought Content: Thought content normal.         Judgment: Judgment normal.          PAT AIRWAY:   Airway:     Mallampati::  I    TM distance::  >3 FB    Neck ROM::  Full        Visit Vitals  /82   Pulse 66   Temp 36.8 °C (98.3 °F) (Temporal)   Ht 1.753 m (5' 9\")   Wt 93.7 kg (206 lb 8 oz)   SpO2 96%   BMI 30.49 kg/m²   Smoking Status Former   BSA 2.14 m²       DASI Risk Score      Flowsheet Row Pre-Admission Testing from 6/24/2025 in [facility]   Can you take care of yourself (eat, dress, bathe, or use toilet)?  2.75 filed at 06/24/2025 1054   Can you walk indoors, such as around your house? 1.75 filed at 06/24/2025 1054   Can you walk a block or two on level ground?  0 filed at 06/24/2025 1054   Can you climb a flight of stairs or walk up a hill? 5.5 filed at 06/24/2025 1054   Can you run a short distance? 0 filed at 06/24/2025 1054   Can you do light work around " the house like dusting or washing dishes? 0 filed at 06/24/2025 1054   Can you do moderate work around the house like vacuuming, sweeping floors or carrying groceries? 0 filed at 06/24/2025 1054   Can you do heavy work around the house like scrubbing floors or lifting and moving heavy furniture?  0 filed at 06/24/2025 1054   Can you do yard work like raking leaves, weeding or pushing a mower? 0 filed at 06/24/2025 1054   Can you have sexual relations? 0 filed at 06/24/2025 1054   Can you participate in moderate recreational activities like golf, bowling, dancing, doubles tennis or throwing a baseball or football? 0 filed at 06/24/2025 1054   Can you participate in strenous sports like swimming, singles tennis, football, basketball, or skiing? 0 filed at 06/24/2025 1054   DASI SCORE 10 filed at 06/24/2025 1054   METS Score (Will be calculated only when all the questions are answered) 4 filed at 06/24/2025 1054          [facility]      Flowsheet Row Pre-Admission Testing [facility] 6/24/2025 in [facility]   DVT Score (IF A SCORE IS NOT CALCULATING, MUST SELECT A BMI TO COMPLETE) 8 filed at 06/24/2025 1146   Surgical Factors Major surgery planned, lasting over 3 hours filed at 06/24/2025 1146   BMI (BMI MUST BE CHOSEN) 30 or less filed at 06/24/2025 1146          Modified Frailty Index    No data to display       [id number] Stroke Risk Points  Current as of just now        1 0 to 9 Points:      No Change          The [id number] risk score (Lip AGUSTIN, et al. 2009. © 2010 American College [employer]) quantifies the risk of stroke for a patient with atrial fibrillation. For patients without atrial fibrillation or under the age of 18 this score appears as N/A. Higher score values generally indicate higher risk of stroke.          Points Metrics   0 Has Congestive Heart Failure:  No     Patients with congestive heart failure get 1 point.    Current as of just now   1 Has Hypertension:  Yes      Patients with hypertension get 1 point.    Current as of just now   0 Age:  60     Patients 65 to 74 years old get 1 point, or patients 75 years and older get 2 points.    Current as of just now   0 Has Diabetes:  No     Patients with diabetes get 1 point.    Current as of just now   0 Had Stroke:  No  Had TIA:  No  Had Thromboembolism:  No     Patients who have had a stroke, TIA, or thromboembolism get 2 points.    Current as of just now   0 Has Vascular Disease:  No     Patients with vascular disease get 1 point.    Current as of just now   0 Clinically Relevant Sex:  Male     Patients with a clinically relevant sex of Female get 1 point.    Current as of just now             Revised Cardiac Risk Index      Flowsheet Row Pre-Admission Testing from 6/24/2025 in [facility]   High-Risk Surgery (Intraperitoneal, Intrathoracic,Suprainguinal vascular) 0 filed at 06/24/2025 1144   History of ischemic heart disease (History of MI, History of positive exercuse test, Current chest paint considered due to myocardial ischemia, Use of nitrate therapy, ECG with pathological Q Waves) 0 filed at 06/24/2025 1144   History of congestive heart failure (pulmonary edemia, bilateral rales or S3 gallop, Paroxysmal nocturnal dyspnea, CXR showing pulmonary vascular redistribution) 0 filed at 06/24/2025 1144   History of cerebrovascular disease (Prior TIA or stroke) 0 filed at 06/24/2025 1144   Pre-operative insulin treatment 0 filed at 06/24/2025 1144   Pre-operative creatinine>2 mg/dl 0 filed at 06/24/2025 1144   Revised Cardiac Risk Calculator 0 filed at 06/24/2025 1144          Apfel Simplified Score      Flowsheet Row Pre-Admission Testing from 6/24/2025 in [facility]   Smoking status 1 filed at 06/24/2025 1146   History of motion sickness or PONV  0 filed at 06/24/2025 1146   Use of postoperative opioids 1 filed at 06/24/2025 1146   Gender - Female 0=No filed at 06/24/2025 1146   Apfel Simplified  Score Calculator 2 filed at 06/24/2025 1146          Risk Analysis Index Results This Encounter    No data found in the last 10 encounters.       Stop Bang Score      Flowsheet Row Pre-Admission Testing from 6/24/2025 in [facility] [place] Colonoscopy & EGD from 3/13/2024 in [facility] with Faina Duenas MD   Do you snore loudly? 1 filed at 06/24/2025 1053 1 filed at 03/13/2024 1150   Do you often feel tired or fatigued after your sleep? 1 filed at 06/24/2025 1053 0 filed at 03/13/2024 1150   Has anyone ever observed you stop breathing in your sleep? 1  [utilizes CPAP X 20 yrs.] filed at 06/24/2025 1053 1 filed at 03/13/2024 1150   Do you have or are you being treated for high blood pressure? 1 filed at 06/24/2025 1053 1 filed at 03/13/2024 1150   Recent BMI (Calculated) 31.4 filed at 06/24/2025 1053 31.5 filed at 03/13/2024 1150   Is BMI greater than 35 kg/m2? 0=No filed at 06/24/2025 1053 0=No filed at 03/13/2024 1150   Age older than 50 years old? 1=Yes filed at 06/24/2025 1053 1=Yes filed at 03/13/2024 1150   Is your neck circumference greater than 17 inches (Male) or 16 inches (Female)? 0 filed at 06/24/2025 1053 1 filed at 03/13/2024 1150   Gender - Male 1=Yes filed at 06/24/2025 1053 1=Yes filed at 03/13/2024 1150   STOP-BANG Total Score 6 filed at 06/24/2025 1053 6 filed at 03/13/2024 1150          Prodigy: High Risk  Total Score: 21              Prodigy Age Score      Prodigy Gender Score     Prodigy SDB Score          ARISCAT Score for Postoperative Pulmonary Complications      Flowsheet Row Pre-Admission Testing from 6/24/2025 in [facility] [place]   Age Calculated Score 3 filed at 06/24/2025 1145   Preoperative SpO2 0 filed at 06/24/2025 1145   Respiratory infection in the last month Either upper or lower (i.e., URI, bronchitis, pneumonia), with fever and antibiotic treatment 0 filed at 06/24/2025 1145   Preoperative anemia (Hgb less than 10 g/dl) 0 filed at 06/24/2025  1145   Surgical incision  0 filed at 06/24/2025 1145   Duration of surgery  23 filed at 06/24/2025 1145   Emergency Procedure  0 filed at 06/24/2025 1145   ARISCAT Total Score  26 filed at 06/24/2025 1145          Arturo Perioperative Risk for Myocardial Infarction or Cardiac Arrest (NEVILLE)    No data to display         Assessment and Plan:   Anesthesia/Airway:  No anesthesia complications        Neuro:    #Anxiety, Depression, ADD, -medicated with Wellbutrin (continue), no further need for perioperative intervention.    Patient is at an increased risk for post operative delirium secondary to depression and type and duration of surgery.  Preoperative brain exercise educational handout provided to patient.    The patient is at an increased risk for perioperative stroke secondary to a-fib, HTN, general anesthesia, hypercoagulable state, and op time >2.5 hours.       HEENT/Airway:    No HEENT diagnosis or significant findings on chart review or clinical presentation and evaluation. No further preoperative testing/intervention indicated at this time.      Cardiovascular:    #HTN, Paroxsymal Atrial Fibrillation, - medicated with metoprolol tartrate (continue), losartan (hold 24 hours prior), flecainide (continue) chlorthalidone (continue) and follows with Cardiology. BP today is 126/82 and denies cardiovascular symptoms. Physical exam is benign. METS is 4 and scheduled for non-cardiac surgery.  Confirmed with Cards NP Diana who states he is scheduled with EP July 3, however it would not delay surgery if missed. She also reports if any episodes of Afib occur perioperatively to notify her. No additional preoperative testing is currently indicated.    METS are 4    RCRI  0 which is 3.9% 30 day risk of MACE (risk for cardiac death, nonfatal myocardial infarction, and nonfactal cardiac arrest    NEVILLE score which indicates a   0.1 % risk of intraoperative or 30-day postoperative MACE    EKG 1/31/25   Sinus Rhythm, incomplete  RBBB       CT Cardiac Scoring: 10/16/2023:  IMPRESSION:  1. Coronary artery calcium score of 0 *.    Echo 10/8/19  CONCLUSIONS:   - Exam indication: Nonsustained atrial fibrillation   - The left ventricle is normal in size. Left ventricular systolic function is   normal. EF = 63 ± 5% (2D biplane)   - The right ventricle is mildly dilated. Right ventricular systolic function is   normal.   - The left atrial cavity is mildly dilated.   - The right atrial cavity is mildly dilated.   - There are no significant valvular abnormalities.   - Estimated right ventricular systolic pressure is likely underestimated due to a   weak or incomplete tricuspid regurgitation signal and is, at least, 29 mmHg   consistent with normal pulmonary artery pressures. Estimated right atrial pressure    is 3 mmHg based on IVC assessment.   - The patient has not had a prior CC echocardiographic exam for comparison.         Pulmonary:    #LEATHA (CPAP)-patient reports he is compliant with his CPAP and directed to bring on day of surgery.  He denies any respiratory symptoms and physical exam is benign.  Preoperative deep breathing educational handout provided to patient.    Patient is at increased risk of perioperative complications secondary to  age > 60, obesity, duration of surgery > 2 hours, types of anesthetic    ARISCAT:    26  points which is a intermediate (13.3%) risk of in-hospital post-op pulmonary complications     PRODIGY:  21  points which is a high risk of post op opioid induced respiratory depression episodes    STOP BAN   points which is a high risk for moderate to severe LEATHA    Pumonary toilet education discussed, patient also provided deep breathing exercises and incentive spirometry educational handout      Renal:   No renal diagnosis, however patient is at increase risk for perioperative renal complications secondary to  Age equal to or greater than 56, BMI equal to or greater than 30, HTN, use of an ace, arb, or NSAID        Endocrine:   No endocrine diagnosis or significant findings on chart review or clinical presentation and evaluation. No further testing or intervention is indicated at this time.      Hematologic/Oncology:      No hematologic diagnosis, however patient is at an increased risk for DVT    Patient confirmed they would accept blood products if necessary.   Preoperative DVT educational handout provided to patient.  Caprini Score:  8  points which is a highest risk of perioperative VTE      Gastrointestinal:   #Erectile dysfunction, MORSE, GERD, diverticulosis- medicated with pantoprazole (continue), tadalafil (also 7/5). Denies GI/ symptoms. LFT wnl on last work up.     EAT-10 score of    0  : self-perceived oropharyngeal dysphagia scale (0-40)     Apfel: 2 points 39% risk for post operative N/V      Infectious disease:     No infectious diagnosis or significant findings on chart review or clinical presentation and evaluation.   Prescription provided for CHG body wash and dental rinse. CHG use instructions reviewed and provided to patient.  Staph screen collected      Musculoskeletal:    #Degenerative scoliosis in adult patient, Pseudoarthrosis of lumbar spine, Epidural lipomatosis, Lumbar radiculopathy, Degenerative lumbar spinal stenosis, chornic pain disorder, - seeking surgical intervention with spine.  He is medicated with naproxen (hold 7 days).       Other:     Hold Vit D supplementation day of surgery  Hold all other vitamins and supplements 7 days prior to surgery  Tylenol okay to continue, please hold Aleve/naproxen/ibuprofen/Excedrin (NSAIDs) for 7 days prior to surgery  No lotion/moisturizers or Deoderant after last shower prior to surgery        Labs ordered  cbc, basic, t/s, A1c, and MSSA/MRSA culture      Bindu Juarez PA-C     Medication instructions and NPO guidelines reviewed with the patient.  All questions or concerns discussed and addressed.   The above clinical summary has been dictated with  voice recognition software. It has not been proofread for grammatical errors, typographical mistakes, or other semantic inconsistencies.   All labs/imaging included on this documentation were reviewed/considered in medical decision making for perioperative planning, including labs ordered day of CPM appointment.             [1]   Past Medical History:  Diagnosis Date    Abnormal gait     ADD (attention deficit disorder)     Making appointments can be difficult due to losing focus according to pt    Anxiety     Arrhythmia     Paroxysmal A-fib, follows with Tracy M Schwab, APRN-CNP    Arthritis     Cervical radiculitis     Chronic pain disorder     left sided low back pain without sciatica    Depression     Diverticulosis     Drusen of both optic discs     Elevated PSA     Epidural lipomatosis     Fatty liver     GERD (gastroesophageal reflux disease)     controlled    History of diverticulitis 2020    S/p treatment Augmentin    History of palpitations     HL (hearing loss)     Hypertension     Lumbar radiculopathy     MORSE (nonalcoholic steatohepatitis)     Personal history of other diseases of the circulatory system 03/30/2022    History of hypertension    Pulmonary nodules     Scoliosis     degenerative scoliosis    SI (sacroiliac) joint dysfunction     Sleep apnea     On CPAP    Spinal stenosis     idiopathic cervical and lumbar   [2]   Past Surgical History:  Procedure Laterality Date    ABLATION OF DYSRHYTHMIC FOCUS  2019    PVI    APPENDECTOMY      BACK SURGERY N/A     COLONOSCOPY  2020    ESOPHAGOGASTRODUODENOSCOPY      OTHER SURGICAL HISTORY  03/30/2022    Appendectomy    OTHER SURGICAL HISTORY  12/30/2022    Left first carpometacarpal arthroplasty with trapezium excision and first metacarpal suspension.    SPINAL FUSION      Lumbar    US GUIDED NEEDLE LIVER BIOPSY  05/18/2020    US GUIDED NEEDLE LIVER BIOPSY 5/18/2020 [facility]   [3]   Family History  Problem Relation Name Age of Onset     Hypertension Mother      No Known Problems Father     [4]   Allergies  Allergen Reactions    Amlodipine Other and Cough     stomach cramps    Venlafaxine Other     Induces Irregular heart beat.          OTHER SURGICAL HISTORY  12/30/2022    Left first carpometacarpal arthroplasty with trapezium excision and first metacarpal suspension.    SPINAL FUSION      Lumbar    US GUIDED NEEDLE LIVER BIOPSY  05/18/2020    US GUIDED NEEDLE LIVER BIOPSY 5/18/2020 [facility]   [3]   Family History  Problem Relation Name Age of Onset    Hypertension Mother      No Known Problems Father     [4]   Allergies  Allergen Reactions    Amlodipine Other and Cough     stomach cramps    Venlafaxine Other     Induces Irregular heart beat.

## 2025-06-24 NOTE — NURSING NOTE
Patient seen in PAT. Orders reviewed with PAT Provider.     Following labs obtained by documenting RN:     AIC, MRSA SWAB, T/S, CBC W DIFF, PREALBUMIN, BMP       Patient discharged with: Pre-procedure instructions/AVS.    Rachell QUEZADAN, RN  [facility]   [facility]

## 2025-06-24 NOTE — PREPROCEDURE INSTRUCTIONS
Thank you for visiting [facility] ([facility]) today for your pre-procedure evaluation, you were seen by     Bindu Juarez PA-C   Department of Anesthesiology and Perioperative Medicine  Main phone [phone]  Fax [phone]    This summary includes instructions and information to aid you during your perioperative period.  Please read carefully. If you have any questions about your visit today, please call the number listed above.  If you become ill or have any changes to your health before your surgery, please contact your primary care provider and alert your surgeon.    General Medications Instructions (see back for further medication instructions)  Hold Vit D supplementation day of surgery  Hold all other vitamins and supplements 7 days prior to surgery  Tylenol okay to continue, please hold Aleve/naproxen/ibuprofen (NSAIDs) for 7 days prior to surgery  No lotion/moisturizers or Deoderant after last shower prior to surgery    You will be called business day prior to surgery to confirm arrival time.     Preparing for Surgery       Preoperative Fasting Guidelines  Why must I stop eating and drinking near surgery time?  With sedation, food or liquid in your stomach can enter your lungs causing serious complications  Food can increase nausea and vomiting  When do I need to stop eating and drinking before my surgery?  Do not eat any food after midnight the night before your surgery/procedure.  You may have up to 13.5 ounces of clear liquid until TWO hours before your instructed arrival time to the hospital.  This includes water, black tea/coffee, (no milk or cream) apple juice, and electrolyte drinks (Gatorade)  You may chew gum until TWO hours before your surgery/procedure    Tobacco and Alcohol;  Do not drink alcohol or smoke within 24 hours of surgery.  It is best to quit smoking for as long as possible before any surgery or procedure.       Other Instructions  Why did I have my nose,  "under my arms, and groin swabbed? The purpose of the swab is to identify Staphylococcus aureus inside your nose or on your skin.  The swab was sent to the laboratory for culture.  A positive swab/culture for Staphylococcus aureus is called colonization or carriage.   What is Staphylococcus aureus? Staphylococcus aureus, also known as \"staph\", is a germ found on the skin or in the nose of healthy people.  Sometimes Staphylococcus aureus can get into the body and cause an infection.  This can be minor (such as pimples, boils, or other skin problems).  It might also be serious (such as a blood infection, pneumonia, or a surgical site infection). What is Staphylococcus aureus colonization or carriage? Colonization or carriage means that a person has the germ but is not sick from it.  These bacteria can be spread on the hands or when breathing or sneezing. How is Staphylococcus aureus spread? It is most often spread by close contact with a person or item that carries it. What happens if my culture is positive for Staphylococcus aureus? Your doctor/medical team will use this information to guide any antibiotic treatment which may be necessary.  Regardless of the culture results, we will clean the inside of your nose with a betadine swab just before you have your surgery. Will I get an infection if I have Staphylococcus aureus in my nose or on my skin? Anyone can get an infection with Staphylococcus aureus.  However, the best way to reduce your risk of infection is to follow the instructions provided to you for the use of your CHG soap and dental rinse.  , Body Wash; What is a home preoperative antibacterial shower? This shower is a way of cleaning the skin with a germ-killing solution before surgery.  The solution contains chlorhexidine, commonly known as CHG.  CHG is a skin cleanser with germ-killing ability.  Let your doctor know if you are allergic to chlorhexidine. Why do I need to take a preoperative antibacterial " shower? Skin is not sterile.  It is best to try to make your skin as free of germs as possible before surgery.  Proper cleansing with a germ-killing soap before surgery can lower the number of germs on your skin.  This helps to reduce the risk of infection at the surgical site.  Following the instructions listed below will help you prepare your skin for surgery.   How do I use the solution? Steps:  Begin using your CHG soap 5 days before your scheduled surgery on ___________.   First, wash and rinse your hair using the CHG soap. Keep CHG soap away from ear canals and eyes.  Rinse completely, do not condition.  Hair extensions should be removed. , Oral/Dental Rinse: What is oral/dental rinse?  It is a mouthwash. It is a way of cleaning the mouth with a germ-killing solution before your surgery.  The solution contains chlorhexidine, commonly known as CHG. It is used inside the mouth to kill a bacteria known as Staphylococcus aureus.  Let your doctor know if you are allergic to Chlorhexidine. Why do I need to use CHG oral/dental rinse? The CHG oral/dental rinse helps to kill a bacteria in your mouth known as Staphylococcus aureus.  This reduces the risk of infection at the surgical site.  Using your CHG oral/dental rinse STEPS: Use your CHG oral/dental rinse after you brush your teeth the night before (at bedtime) and the morning of your surgery.  Follow all directions on your prescription label.  Use the cap on the container to measure 15 ml.  Swish (gargle if you can) the mouthwash in your mouth for at least 30 seconds, (do not swallow) and spit out.  After you use your CHG rinse, do not rinse your mouth with water, drink or eat.  Please refer to the prescription label for the appropriate time to resume oral intake What side effects might I have using the CHG oral/dental rinse? CHG rinse will stick to plaque on the teeth.  Brush and floss just before use.  Teeth brushing will help avoid staining of plaque during use.        The Week before Surgery        Seven days before Surgery  Check your [facility] medication instructions  Do the exercises provided to you by [facility]   Arrange for a responsible, adult licensed  to take you home after surgery and stay with you for 24 hours.  You will not be permitted to drive yourself home if you have received any anesthetic/sedation  Five days before surgery  Check your [facility] medication instructions  Do the exercises provided to you by [facility]   Start using Chlorhexidene (CHG) body wash if prescribed (Continue till day of surgery)      The Day before Surgery       Check your [facility] medication and all other [facility] instructions including when to stop eating and drinking  You will be called with your arrival time for surgery in the late afternoon.  If you do not receive a call please reach out to your surgeon's office.  Do not smoke or drink 24 hours before surgery  Prepare items to bring with you to the hospital  Shower with your chlorhexidine wash if prescribed  Brush your teeth and use your chlorhexidine dental rinse if prescribed    The Day of Surgery       Check your [facility] medication instructions  Shower, if prescribed use CHG.  Do not apply any lotions, creams, moisturizers, perfume or deodorant  Brush your teeth and use your CHG dental rinse if prescribed  Wear loose comfortable clothing  Avoid make-up  Remove  jewelry and piercings, consider professional piercing removal with a plastic spacer if needed  Bring photo ID and Insurance card  Bring an accurate medication list that includes medication dose, frequency and allergies  Bring a copy of your advanced directives (will, health care power of )  Bring any devices and controllers as well as medical devices you have been provided with for surgery (CPAP, slings, braces, etc.)  Dentures, eyeglasses, and contacts will be removed before surgery, please bring cases for contacts or glasses    Preoperative Deep Breathing Exercises    Why it is important to  do deep breathing exercises before my surgery?  Deep breathing exercises strengthen your breathing muscles.  This helps you to recover after your surgery and decreases the chance of breathing complications.    How are the deep breathing exercises done?  Sit straight with your back supported.  Breathe in deeply and slowly through your nose. Your lower rib cage should expand and your abdomen may move forward.  Hold that breath for 3 to 5 seconds.  Breathe out through pursed lips, slowly and completely.  Rest and repeat 10 times every hour while awake.  Rest longer if you become dizzy or lightheaded.      Preoperative Brain Exercises    What are brain exercises?  A brain exercise is any activity that engages your thinking (cognitive) skills.    What types of activities are considered brain exercises?  Jigsaw puzzles, crossword puzzles, word jumble, memory games, word search, and many more.  Many can be found free online or on your phone via a mobile raphael.    Why should I do brain exercises before my surgery?  More recent research has shown brain exercise before surgery can lower the risk of postoperative delirium (confusion) which can be especially important for older adults.  Patients who did brain exercises for 5 to 10 hours the days before surgery, cut their risk of postoperative delirium in half up to 1 week after surgery.    Sit-to-Stand Exercise    What is the sit-to-stand exercise?  The sit-to-stand exercise strengthens the muscles of your lower body and muscles in the center of your body (core muscles for stability) helping to maintain and improve your strength and mobility.  How do I do the sit-to-stand exercise?  The goal is to do this exercise without using your arms or hands.  If this is too difficult, use your arms and hands or a chair with armrests to help slowly push yourself to the standing position and lower yourself back to the sitting position. As the movement becomes easier use your arms and hands  less.    Steps to the sit-to-stand exercise  Sit up tall in a sturdy chair, knees bent, feet flat on the floor shoulder-width apart.  Shift your hips/pelvis forward in the chair to correctly position yourself for the next movement.  Lean forward at your hips.  Stand up straight putting equal weight on both feet.  Check to be sure you are properly aligned with the chair, in a slow controlled movement sit back down.  Repeat this exercise 10-15 times.  If needed you can do it fewer times until your strength improves.  Rest for 1 minute.  Do another 10-15 sit-to-stand exercises.  Try to do this in the morning and evening.       Simple things you can do to help prevent blood clots     Blood clots are blockages that can form in the body's veins. When a blood clot forms in your deep veins, it may be called a deep vein thrombosis, or DVT for short. Blood clots can happen in any part of the body where blood flows, but they are most common in the arms and legs. If a piece of a blood clot breaks free and travels to the lungs, it is called a pulmonary embolus (PE). A PE can be a very serious problem.      Being in the hospital or having surgery can raise your chances of getting a blood clot because you may not be well enough to move around as much as you normally do.         Ways you can help prevent blood clots in the hospital       Wearing SCDs  SCDs stands for Sequential Compression Devices.   SCDs are special sleeves that wrap around your legs. They attach to a pump that fills them with air to gently squeeze your legs every few minutes.  This helps return the blood in your legs to your heart.   SCDs should only be taken off when walking or bathing. SCDs may not be comfortable, but they can help save your life.              Pump SCD leg sleeves  Wearing compression stockings - if your doctor orders them. These special snug-fitting stockings gently squeeze your legs to help blood flow.       Walking. Walking helps move the  blood in your legs.   If your doctor says it is ok, try walking the halls at least   5 times a day. Ask us to help you get up, so you don't fall.      Taking any blood-thinning medicines your doctor orders.              Ways you can help prevent blood clots at home         Wearing compression stockings - if your doctor orders them.   Walking - to help move the blood in your legs.    Taking any blood-thinning medicines your doctor orders.      Signs of a blood clot or PE    Tell your doctor or nurse right away if you have any of the problems listed below.         If you are at home, seek medical care right away. Call 911 for chest pain or problems breathing.            Signs of a blood clot (DVT) - such as pain, swelling, redness, or warmth in your arm or legs.  Signs of a pulmonary embolism (PE) - such as chest pain or feeling short of breath

## 2025-06-24 NOTE — H&P (VIEW-ONLY)
CPM/PAT Evaluation       Name: Kvng June (Kvng June)  /Age: 1965/60 y.o.     Visit Type:   In-Person       Chief Complaint: perioperative evaluation    HPI HPI: 59 y/o male scheduled for T10-pelvis revision/extension fusion with L1-2, L2-3, and L5-S1 laminectomies; L2-3, L4-5, and L5-S1 transforaminal lumbar interbody fusion and bilateral sacroiliac joint fusion  on 2025  secondary to Degenerative scoliosis in adult patient, Pseudoarthrosis of lumbar spine, Epidural lipomatosis, Lumbar radiculopathy, Degenerative lumbar spinal stenosis  with Dr. Femi Rivera & Dr Miguel Berg who referred to CPM.  Presents to CPM today for perioperative risk stratification and optimization. PMHX includes Anxiety, Depression, ADD, HTN, Paroxsymal Atrial Fibrillation, LEATHA (CPAP), Erectile Dysfunction, MORSE, GERD, diverticulosis, Degenerative scoliosis in adult patient, Pseudoarthrosis of lumbar spine, Epidural lipomatosis, Lumbar radiculopathy, Degenerative lumbar spinal stenosis, chornic pain disorder, .    PCP: Rico Spencer DO    Specialists:  Neurosurgery- eFmi Rivera MD PhD   Ortho Surg- Jared Moraes MD   Cardiology- Tracy M Schwab, APRN-CNP   GI- JUAN Potter-KENNY             Medical History[1]    Surgical History[2]    Patient Sexual activity questions deferred to the physician.    Family History[3]    Allergies[4]    Prior to Admission medications    Medication Sig Start Date End Date Taking? Authorizing Provider   benzonatate (Tessalon) 100 mg capsule Take 1 capsule (100 mg) by mouth 3 times a day as needed for cough. Do not crush or chew. 25  Stanley Thurston, DO   buPROPion XL (Wellbutrin XL) 150 mg 24 hr tablet TAKE 1 TABLET BY MOUTH ONCE IN THE MORNING. DO NOT CRUSH, CHEW OR SPLIT TABLETS. 6/10/25   Stanley Thurston, DO   buPROPion XL (Wellbutrin XL) 300 mg 24 hr tablet TAKE 1 TABLET BY MOUTH ONCE DAILY IN THE MORNING. DO NOT CRUSH, CHEW OR SPLIT TABLETS.  5/6/25   Stanley Thurston, DO   chlorthalidone (Hygroton) 25 mg tablet Take 1 tablet (25 mg) by mouth once daily. 5/27/25 8/25/25  Stanley Thurston DO   famciclovir (Famvir) 250 mg tablet TAKE ONE TABLET BY MOUTH TWO TIMES A DAY 1/28/25   Stanley Thurston DO   fexofenadine (Allegra) 180 mg tablet Take 1 tablet (180 mg) by mouth once daily.    Historical Provider, MD   flaxseed oiL 1,000 mg capsule Take 1 capsule (1,000 mg) by mouth 2 times a day.    Historical Provider, MD   flecainide (Tambocor) 100 mg tablet Take 1 tablet (100 mg) by mouth 2 times a day. 1/31/25 1/31/26  Tracy M Schwab, APRN-CNP   gabapentin (Neurontin) 300 mg capsule Take by mouth. 4/12/23   Historical Provider, MD   glucosamine-chondroitin 500-400 mg tablet Take 1 tablet by mouth 3 times a day.    Historical Provider, MD   ipratropium (Atrovent) 21 mcg (0.03 %) nasal spray Administer 2 sprays into each nostril every 12 hours. 5/27/25 5/27/26  Stanley Thurston DO   losartan (Cozaar) 100 mg tablet Take 1 tablet (100 mg) by mouth once daily. 5/27/25 5/27/26  Stanley Thurston DO   metoprolol tartrate (Lopressor) 25 mg tablet Take 1 tablet (25 mg) by mouth 2 times a day. 5/27/25   Stanley Thurston, DO   multivitamin tablet Take 1 tablet by mouth once daily.    Historical Provider, MD   ondansetron (Zofran) 4 mg tablet Take 1 tablet (4 mg) by mouth every 8 hours if needed for nausea or vomiting.  Patient not taking: Reported on 5/27/2025 1/31/24   Keyur Bowden, DO   pantoprazole (ProtoNix) 40 mg EC tablet TAKE 1 TABLET BY MOUTH ONCE DAILY IN THE MORNING BEFORE A MEAL. DO NOT CRUSH, CHEW OR SPLIT TABLETS. 1/28/25   Stanley Thurston, DO   prednisoLONE acetate (Pred-Forte) 1 % ophthalmic suspension Administer 1 drop into the left eye 4 times a day. 10/31/24   Sonali Tarango, KADEEM   tadalafil (Cialis) 5 mg tablet Take 1 tablet (5 mg) by mouth once daily as needed for erectile dysfunction. 5/27/25 6/26/25  Stanley Thurston, DO   turmeric-turmeric root extract  450-50 mg capsule Take by mouth.    Historical Provider, MD MCCLELLAND ROS:   Constitutional:    no fever   no chills   no unexpected weight change  Neuro/Psych:    no numbness   no weakness   no light-headedness   no tremors   no confusion   not nervous/anxious   no self-injury   no suicidal ideation  Eyes:    no discharge   no vision loss   no diplopia   no visual disturbance   use of corrective lenses  Ears:    no ear pain   no hearing loss   no vertigo   no tinnitus   no hearing aides  Nose:    no nasal discharge   no sinus congestion   no epistaxis  Mouth:    no dental issues   no mouth pain   no oral bleeding   no mouth lesions  Throat:    no throat pain   no dysphagia   no voice change  Neck:    no neck pain   neck swelling   no neck stiffness   no neck masses  Cardio:    no chest pain   no palpitations   no peripheral edema   no dyspnea   no WRIGHT   no paroxysmal nocturnal dyspnea  Respiratory:    no cough   no wheezing   no hemoptysis   no shortness of breath  Endocrine:    no cold intolerance   no heat intolerance   no polydipsia  GI:    no abdominal distention   no abdominal pain   no constipation   no diarrhea   no nausea   no vomiting   no blood in stool  :    no difficulty urinating   no dysuria   no oliguria   no polyuria  Musculoskeletal:    no arthralgias   myalgias (back pain)   no decreased ROM   no swelling  Hematologic:    does not bruise/bleed easily   no excessive bleeding   no history of blood transfusion   no blood clots  Skin:   no skin changes   no sores/wound   no rash      Physical Exam  Vitals reviewed.   Constitutional:       General: He is not in acute distress.     Appearance: Normal appearance. He is obese. He is not ill-appearing, toxic-appearing or diaphoretic.   HENT:      Head: Normocephalic.      Nose: Nose normal. No congestion or rhinorrhea.      Mouth/Throat:      Mouth: Mucous membranes are moist.      Pharynx: Oropharynx is clear. No oropharyngeal exudate or posterior  "oropharyngeal erythema.   Eyes:      General: No scleral icterus.        Right eye: No discharge.         Left eye: No discharge.      Conjunctiva/sclera: Conjunctivae normal.   Neck:      Vascular: No carotid bruit.   Cardiovascular:      Rate and Rhythm: Normal rate and regular rhythm.      Pulses: Normal pulses.      Heart sounds: Normal heart sounds. No murmur heard.     No friction rub. No gallop.   Pulmonary:      Effort: Pulmonary effort is normal. No respiratory distress.      Breath sounds: Normal breath sounds. No stridor. No wheezing, rhonchi or rales.   Chest:      Chest wall: No tenderness.   Musculoskeletal:         General: No swelling or tenderness.      Cervical back: Normal range of motion. No rigidity or tenderness.   Skin:     Findings: Lesion (multiple small skin lesions on face/scalp. no drainage or sign of infection.) present.   Neurological:      General: No focal deficit present.      Mental Status: He is alert.   Psychiatric:         Mood and Affect: Mood normal.         Behavior: Behavior normal.         Thought Content: Thought content normal.         Judgment: Judgment normal.          PAT AIRWAY:   Airway:     Mallampati::  I    TM distance::  >3 FB    Neck ROM::  Full        Visit Vitals  /82   Pulse 66   Temp 36.8 °C (98.3 °F) (Temporal)   Ht 1.753 m (5' 9\")   Wt 93.7 kg (206 lb 8 oz)   SpO2 96%   BMI 30.49 kg/m²   Smoking Status Former   BSA 2.14 m²       DASI Risk Score      Flowsheet Row Pre-Admission Testing [facility] 6/24/2025 in [facility]   Can you take care of yourself (eat, dress, bathe, or use toilet)?  2.75 filed at 06/24/2025 1054   Can you walk indoors, such as around your house? 1.75 filed at 06/24/2025 1054   Can you walk a block or two on level ground?  0 filed at 06/24/2025 1054   Can you climb a flight of stairs or walk up a hill? 5.5 filed at 06/24/2025 1054   Can you run a short distance? 0 filed at 06/24/2025 1054   Can you do light work around " the house like dusting or washing dishes? 0 filed at 06/24/2025 1054   Can you do moderate work around the house like vacuuming, sweeping floors or carrying groceries? 0 filed at 06/24/2025 1054   Can you do heavy work around the house like scrubbing floors or lifting and moving heavy furniture?  0 filed at 06/24/2025 1054   Can you do yard work like raking leaves, weeding or pushing a mower? 0 filed at 06/24/2025 1054   Can you have sexual relations? 0 filed at 06/24/2025 1054   Can you participate in moderate recreational activities like golf, bowling, dancing, doubles tennis or throwing a baseball or football? 0 filed at 06/24/2025 1054   Can you participate in strenous sports like swimming, singles tennis, football, basketball, or skiing? 0 filed at 06/24/2025 1054   DASI SCORE 10 filed at 06/24/2025 1054   METS Score (Will be calculated only when all the questions are answered) 4 filed at 06/24/2025 1054          [facility]      Flowsheet Row Pre-Admission Testing from 6/24/2025 in [facility]   DVT Score (IF A SCORE IS NOT CALCULATING, MUST SELECT A BMI TO COMPLETE) 8 filed at 06/24/2025 1146   Surgical Factors Major surgery planned, lasting over 3 hours filed at 06/24/2025 1146   BMI (BMI MUST BE CHOSEN) 30 or less filed at 06/24/2025 1146          Modified Frailty Index    No data to display       [id number] Stroke Risk Points  Current as of just now        1 0 to 9 Points:      No Change          The [id number] risk score (Lip AGUSTIN, et al. 2009. © 2010 American College [employer]) quantifies the risk of stroke for a patient with atrial fibrillation. For patients without atrial fibrillation or under the age of 18 this score appears as N/A. Higher score values generally indicate higher risk of stroke.          Points Metrics   0 Has Congestive Heart Failure:  No     Patients with congestive heart failure get 1 point.    Current as of just now   1 Has Hypertension:  Yes      Patients with hypertension get 1 point.    Current as of just now   0 Age:  60     Patients 65 to 74 years old get 1 point, or patients 75 years and older get 2 points.    Current as of just now   0 Has Diabetes:  No     Patients with diabetes get 1 point.    Current as of just now   0 Had Stroke:  No  Had TIA:  No  Had Thromboembolism:  No     Patients who have had a stroke, TIA, or thromboembolism get 2 points.    Current as of just now   0 Has Vascular Disease:  No     Patients with vascular disease get 1 point.    Current as of just now   0 Clinically Relevant Sex:  Male     Patients with a clinically relevant sex of Female get 1 point.    Current as of just now             Revised Cardiac Risk Index      Flowsheet Row Pre-Admission Testing from 6/24/2025 in [facility]   High-Risk Surgery (Intraperitoneal, Intrathoracic,Suprainguinal vascular) 0 filed at 06/24/2025 1144   History of ischemic heart disease (History of MI, History of positive exercuse test, Current chest paint considered due to myocardial ischemia, Use of nitrate therapy, ECG with pathological Q Waves) 0 filed at 06/24/2025 1144   History of congestive heart failure (pulmonary edemia, bilateral rales or S3 gallop, Paroxysmal nocturnal dyspnea, CXR showing pulmonary vascular redistribution) 0 filed at 06/24/2025 1144   History of cerebrovascular disease (Prior TIA or stroke) 0 filed at 06/24/2025 1144   Pre-operative insulin treatment 0 filed at 06/24/2025 1144   Pre-operative creatinine>2 mg/dl 0 filed at 06/24/2025 1144   Revised Cardiac Risk Calculator 0 filed at 06/24/2025 1144          Apfel Simplified Score      Flowsheet Row Pre-Admission Testing from 6/24/2025 in [facility]   Smoking status 1 filed at 06/24/2025 1146   History of motion sickness or PONV  0 filed at 06/24/2025 1146   Use of postoperative opioids 1 filed at 06/24/2025 1146   Gender - Female 0=No filed at 06/24/2025 1146   Apfel Simplified  Score Calculator 2 filed at 06/24/2025 1146          Risk Analysis Index Results This Encounter    No data found in the last 10 encounters.       Stop Bang Score      Flowsheet Row Pre-Admission Testing from 6/24/2025 in [facility] Colonoscopy & EGD from 3/13/2024 in [facility] with Faina Duenas MD   Do you snore loudly? 1 filed at 06/24/2025 1053 1 filed at 03/13/2024 1150   Do you often feel tired or fatigued after your sleep? 1 filed at 06/24/2025 1053 0 filed at 03/13/2024 1150   Has anyone ever observed you stop breathing in your sleep? 1  [utilizes CPAP X 20 yrs.] filed at 06/24/2025 1053 1 filed at 03/13/2024 1150   Do you have or are you being treated for high blood pressure? 1 filed at 06/24/2025 1053 1 filed at 03/13/2024 1150   Recent BMI (Calculated) 31.4 filed at 06/24/2025 1053 31.5 filed at 03/13/2024 1150   Is BMI greater than 35 kg/m2? 0=No filed at 06/24/2025 1053 0=No filed at 03/13/2024 1150   Age older than 50 years old? 1=Yes filed at 06/24/2025 1053 1=Yes filed at 03/13/2024 1150   Is your neck circumference greater than 17 inches (Male) or 16 inches (Female)? 0 filed at 06/24/2025 1053 1 filed at 03/13/2024 1150   Gender - Male 1=Yes filed at 06/24/2025 1053 1=Yes filed at 03/13/2024 1150   STOP-BANG Total Score 6 filed at 06/24/2025 1053 6 filed at 03/13/2024 1150          Prodigy: High Risk  Total Score: 21              Prodigy Age Score      Prodigy Gender Score     Prodigy SDB Score          ARISCAT Score for Postoperative Pulmonary Complications      Flowsheet Row Pre-Admission Testing from 6/24/2025 in [facility]   Age Calculated Score 3 filed at 06/24/2025 1145   Preoperative SpO2 0 filed at 06/24/2025 1145   Respiratory infection in the last month Either upper or lower (i.e., URI, bronchitis, pneumonia), with fever and antibiotic treatment 0 filed at 06/24/2025 1145   Preoperative anemia (Hgb less than 10 g/dl) 0 filed at 06/24/2025  1145   Surgical incision  0 filed at 06/24/2025 1145   Duration of surgery  23 filed at 06/24/2025 1145   Emergency Procedure  0 filed at 06/24/2025 1145   ARISCAT Total Score  26 filed at 06/24/2025 1145          Arturo Perioperative Risk for Myocardial Infarction or Cardiac Arrest (NEVILLE)    No data to display         Assessment and Plan:   Anesthesia/Airway:  No anesthesia complications        Neuro:    #Anxiety, Depression, ADD, -medicated with Wellbutrin (continue), no further need for perioperative intervention.    Patient is at an increased risk for post operative delirium secondary to depression and type and duration of surgery.  Preoperative brain exercise educational handout provided to patient.    The patient is at an increased risk for perioperative stroke secondary to a-fib, HTN, general anesthesia, hypercoagulable state, and op time >2.5 hours.       HEENT/Airway:    No HEENT diagnosis or significant findings on chart review or clinical presentation and evaluation. No further preoperative testing/intervention indicated at this time.      Cardiovascular:    #HTN, Paroxsymal Atrial Fibrillation, - medicated with metoprolol tartrate (continue), losartan (hold 24 hours prior), flecainide (continue) chlorthalidone (continue) and follows with Cardiology. BP today is 126/82 and denies cardiovascular symptoms. Physical exam is benign. METS is 4 and scheduled for non-cardiac surgery.  Confirmed with Cards NP Diana who states he is scheduled with EP July 3, however it would not delay surgery if missed. She also reports if any episodes of Afib occur perioperatively to notify her. No additional preoperative testing is currently indicated.    METS are 4    RCRI  0 which is 3.9% 30 day risk of MACE (risk for cardiac death, nonfatal myocardial infarction, and nonfactal cardiac arrest    NEVILLE score which indicates a   0.1 % risk of intraoperative or 30-day postoperative MACE    EKG 1/31/25   Sinus Rhythm, incomplete  RBBB       CT Cardiac Scoring: 10/16/2023:  IMPRESSION:  1. Coronary artery calcium score of 0 *.    Echo 10/8/19  CONCLUSIONS:   - Exam indication: Nonsustained atrial fibrillation   - The left ventricle is normal in size. Left ventricular systolic function is   normal. EF = 63 ± 5% (2D biplane)   - The right ventricle is mildly dilated. Right ventricular systolic function is   normal.   - The left atrial cavity is mildly dilated.   - The right atrial cavity is mildly dilated.   - There are no significant valvular abnormalities.   - Estimated right ventricular systolic pressure is likely underestimated due to a   weak or incomplete tricuspid regurgitation signal and is, at least, 29 mmHg   consistent with normal pulmonary artery pressures. Estimated right atrial pressure    is 3 mmHg based on IVC assessment.   - The patient has not had a prior CC echocardiographic exam for comparison.         Pulmonary:    #LEATHA (CPAP)-patient reports he is compliant with his CPAP and directed to bring on day of surgery.  He denies any respiratory symptoms and physical exam is benign.  Preoperative deep breathing educational handout provided to patient.    Patient is at increased risk of perioperative complications secondary to  age > 60, obesity, duration of surgery > 2 hours, types of anesthetic    ARISCAT:    26  points which is a intermediate (13.3%) risk of in-hospital post-op pulmonary complications     PRODIGY:  21  points which is a high risk of post op opioid induced respiratory depression episodes    STOP BAN   points which is a high risk for moderate to severe LEATHA    Pumonary toilet education discussed, patient also provided deep breathing exercises and incentive spirometry educational handout      Renal:   No renal diagnosis, however patient is at increase risk for perioperative renal complications secondary to  Age equal to or greater than 56, BMI equal to or greater than 30, HTN, use of an ace, arb, or NSAID        Endocrine:   No endocrine diagnosis or significant findings on chart review or clinical presentation and evaluation. No further testing or intervention is indicated at this time.      Hematologic/Oncology:      No hematologic diagnosis, however patient is at an increased risk for DVT    Patient confirmed they would accept blood products if necessary.   Preoperative DVT educational handout provided to patient.  Caprini Score:  8  points which is a highest risk of perioperative VTE      Gastrointestinal:   #Erectile dysfunction, MORSE, GERD, diverticulosis- medicated with pantoprazole (continue), tadalafil (also 7/5). Denies GI/ symptoms. LFT wnl on last work up.     EAT-10 score of    0  : self-perceived oropharyngeal dysphagia scale (0-40)     Apfel: 2 points 39% risk for post operative N/V      Infectious disease:     No infectious diagnosis or significant findings on chart review or clinical presentation and evaluation.   Prescription provided for CHG body wash and dental rinse. CHG use instructions reviewed and provided to patient.  Staph screen collected      Musculoskeletal:    #Degenerative scoliosis in adult patient, Pseudoarthrosis of lumbar spine, Epidural lipomatosis, Lumbar radiculopathy, Degenerative lumbar spinal stenosis, chornic pain disorder, - seeking surgical intervention with spine.  He is medicated with naproxen (hold 7 days).       Other:     Hold Vit D supplementation day of surgery  Hold all other vitamins and supplements 7 days prior to surgery  Tylenol okay to continue, please hold Aleve/naproxen/ibuprofen/Excedrin (NSAIDs) for 7 days prior to surgery  No lotion/moisturizers or Deoderant after last shower prior to surgery        Labs ordered  Recent Results (from the past 3 weeks)   Basic Metabolic Panel    Collection Time: 06/24/25 11:09 AM   Result Value Ref Range    Glucose 151 (H) 74 - 99 mg/dL    Sodium 138 136 - 145 mmol/L    Potassium 4.3 3.5 - 5.3 mmol/L    Chloride 101 98 - 107  mmol/L    Bicarbonate 27 21 - 32 mmol/L    Anion Gap 14 10 - 20 mmol/L    Urea Nitrogen 27 (H) 6 - 23 mg/dL    Creatinine 1.47 (H) 0.50 - 1.30 mg/dL    eGFR 54 (L) >60 mL/min/1.73m*2    Calcium 10.2 8.6 - 10.6 mg/dL   Prealbumin    Collection Time: 06/24/25 11:09 AM   Result Value Ref Range    Prealbumin 29.8 18.0 - 40.0 mg/dL   CBC and Auto Differential    Collection Time: 06/24/25 11:09 AM   Result Value Ref Range    WBC 6.5 4.4 - 11.3 x10*3/uL    nRBC 0.0 0.0 - 0.0 /100 WBCs    RBC 3.34 (L) 4.50 - 5.90 x10*6/uL    Hemoglobin 11.4 (L) 13.5 - 17.5 g/dL    Hematocrit 35.0 (L) 41.0 - 52.0 %     (H) 80 - 100 fL    MCH 34.1 (H) 26.0 - 34.0 pg    MCHC 32.6 32.0 - 36.0 g/dL    RDW 13.1 11.5 - 14.5 %    Platelets 194 150 - 450 x10*3/uL    Neutrophils % 48.6 40.0 - 80.0 %    Immature Granulocytes %, Automated 0.8 0.0 - 0.9 %    Lymphocytes % 31.5 13.0 - 44.0 %    Monocytes % 15.3 2.0 - 10.0 %    Eosinophils % 3.3 0.0 - 6.0 %    Basophils % 0.5 0.0 - 2.0 %    Neutrophils Absolute 3.14 1.20 - 7.70 x10*3/uL    Immature Granulocytes Absolute, Automated 0.05 0.00 - 0.70 x10*3/uL    Lymphocytes Absolute 2.03 1.20 - 4.80 x10*3/uL    Monocytes Absolute 0.99 0.10 - 1.00 x10*3/uL    Eosinophils Absolute 0.21 0.00 - 0.70 x10*3/uL    Basophils Absolute 0.03 0.00 - 0.10 x10*3/uL   Type And Screen Is this order related to pregnancy or an upcoming surgery? Yes; Where will this surgery/delivery be performed? [facility]; What is the date of the surgery? 7/9/2025; Has this patient ever had a transfusion? No; Has th...    Collection Time: 06/24/25 11:09 AM   Result Value Ref Range    ABO TYPE O     Rh TYPE POS     ANTIBODY SCREEN NEG    Staphylococcus aureus/MRSA colonization, Culture    Collection Time: 06/24/25 11:09 AM    Specimen: Nares/Axilla/Groin; Swab   Result Value Ref Range    Staph/MRSA Screen Culture (A)      Isolated: Methicillin Susceptible Staphylococcus aureus (MSSA)   Hemoglobin A1C    Collection Time:  06/24/25 11:09 AM   Result Value Ref Range    Hemoglobin A1C 5.6 See comment %    Estimated Average Glucose 114 Not Established mg/dL           Bindu Juarez PA-C     Medication instructions and NPO guidelines reviewed with the patient.  All questions or concerns discussed and addressed.   The above clinical summary has been dictated with voice recognition software. It has not been proofread for grammatical errors, typographical mistakes, or other semantic inconsistencies.   All labs/imaging included on this documentation were reviewed/considered in medical decision making for perioperative planning, including labs ordered day of CPM appointment.             [1]   Past Medical History:  Diagnosis Date    Abnormal gait     ADD (attention deficit disorder)     Making appointments can be difficult due to losing focus according to pt    Anxiety     Arrhythmia     Paroxysmal A-fib, follows with Tracy M Schwab, APRN-CNP    Arthritis     Cervical radiculitis     Chronic pain disorder     left sided low back pain without sciatica    Depression     Diverticulosis     Drusen of both optic discs     Elevated PSA     Epidural lipomatosis     Fatty liver     GERD (gastroesophageal reflux disease)     controlled    History of diverticulitis 2020    S/p treatment Augmentin    History of palpitations     HL (hearing loss)     Hypertension     Lumbar radiculopathy     MORSE (nonalcoholic steatohepatitis)     Personal history of other diseases of the circulatory system 03/30/2022    History of hypertension    Pulmonary nodules     Scoliosis     degenerative scoliosis    SI (sacroiliac) joint dysfunction     Sleep apnea     On CPAP    Spinal stenosis     idiopathic cervical and lumbar   [2]   Past Surgical History:  Procedure Laterality Date    ABLATION OF DYSRHYTHMIC FOCUS  2019    PVI    APPENDECTOMY      BACK SURGERY N/A     COLONOSCOPY  2020    ESOPHAGOGASTRODUODENOSCOPY      OTHER SURGICAL HISTORY  03/30/2022    Appendectomy     OTHER SURGICAL HISTORY  12/30/2022    Left first carpometacarpal arthroplasty with trapezium excision and first metacarpal suspension.    SPINAL FUSION      Lumbar    US GUIDED NEEDLE LIVER BIOPSY  05/18/2020    US GUIDED NEEDLE LIVER BIOPSY 5/18/2020 [facility]   [3]   Family History  Problem Relation Name Age of Onset    Hypertension Mother      No Known Problems Father     [4]   Allergies  Allergen Reactions    Amlodipine Other and Cough     stomach cramps    Venlafaxine Other     Induces Irregular heart beat.

## 2025-06-25 DIAGNOSIS — Z98.1 S/P SPINAL FUSION: Primary | ICD-10-CM

## 2025-06-25 LAB — STAPHYLOCOCCUS SPEC CULT: ABNORMAL

## 2025-07-01 PROBLEM — R26.2 DIFFICULTY WALKING: Status: RESOLVED | Noted: 2023-10-24 | Resolved: 2025-07-01

## 2025-07-01 PROBLEM — M67.40 GANGLION: Status: RESOLVED | Noted: 2023-10-24 | Resolved: 2025-07-01

## 2025-07-01 PROBLEM — M19.049 ARTHROPATHY OF HAND: Status: RESOLVED | Noted: 2023-02-23 | Resolved: 2025-07-01

## 2025-07-01 PROBLEM — M79.646 THUMB PAIN: Status: RESOLVED | Noted: 2025-06-06 | Resolved: 2025-07-01

## 2025-07-03 ENCOUNTER — APPOINTMENT (OUTPATIENT)
Dept: CARDIOLOGY | Facility: CLINIC | Age: 60
End: 2025-07-03
Payer: COMMERCIAL

## 2025-07-03 VITALS
HEIGHT: 69 IN | BODY MASS INDEX: 30.51 KG/M2 | WEIGHT: 206 LBS | HEART RATE: 75 BPM | DIASTOLIC BLOOD PRESSURE: 96 MMHG | SYSTOLIC BLOOD PRESSURE: 140 MMHG | OXYGEN SATURATION: 95 %

## 2025-07-03 DIAGNOSIS — I48.91 ATRIAL FIBRILLATION, UNSPECIFIED TYPE (MULTI): ICD-10-CM

## 2025-07-03 PROCEDURE — 3077F SYST BP >= 140 MM HG: CPT | Performed by: STUDENT IN AN ORGANIZED HEALTH CARE EDUCATION/TRAINING PROGRAM

## 2025-07-03 PROCEDURE — 3008F BODY MASS INDEX DOCD: CPT | Performed by: STUDENT IN AN ORGANIZED HEALTH CARE EDUCATION/TRAINING PROGRAM

## 2025-07-03 PROCEDURE — 93000 ELECTROCARDIOGRAM COMPLETE: CPT | Performed by: STUDENT IN AN ORGANIZED HEALTH CARE EDUCATION/TRAINING PROGRAM

## 2025-07-03 PROCEDURE — 3080F DIAST BP >= 90 MM HG: CPT | Performed by: STUDENT IN AN ORGANIZED HEALTH CARE EDUCATION/TRAINING PROGRAM

## 2025-07-03 PROCEDURE — 99204 OFFICE O/P NEW MOD 45 MIN: CPT | Performed by: STUDENT IN AN ORGANIZED HEALTH CARE EDUCATION/TRAINING PROGRAM

## 2025-07-03 NOTE — PROGRESS NOTES
"    Cardiac Electrophysiology Office Visit     Referred by Dr. Schwab, Diana HENNING, APRN-C* for No chief complaint on file.    HPI:  Kvng June is a 60 y.o. year old male patient with h/o HTN, LEATHA, AF presenting today to establish care    Objective  Current Outpatient Medications   Medication Instructions    buPROPion XL (Wellbutrin XL) 150 mg 24 hr tablet TAKE 1 TABLET BY MOUTH ONCE IN THE MORNING. DO NOT CRUSH, CHEW OR SPLIT TABLETS.    buPROPion XL (Wellbutrin XL) 300 mg 24 hr tablet TAKE 1 TABLET BY MOUTH ONCE DAILY IN THE MORNING. DO NOT CRUSH, CHEW OR SPLIT TABLETS.    chlorhexidine (Hibiclens) 4 % external liquid Topical, Daily PRN, Use for 5 days prior to surgery as body wash, do not use on face or genital region. Leave on 3 min prior to removal    chlorthalidone (HYGROTON) 25 mg, oral, Daily    famciclovir (FAMVIR) 250 mg, oral, 2 times daily    fexofenadine (Allegra) 180 mg tablet 1 tablet, Daily    flaxseed oiL 1,000 mg capsule 1 capsule, 2 times daily    flecainide (TAMBOCOR) 100 mg, oral, 2 times daily    glucosamine-chondroitin 500-400 mg tablet 1 tablet, 3 times daily    ipratropium (Atrovent) 21 mcg (0.03 %) nasal spray 2 sprays, Each Nostril, Every 12 hours    losartan (COZAAR) 100 mg, oral, Daily    metoprolol tartrate (LOPRESSOR) 25 mg, oral, 2 times daily    multivitamin tablet 1 tablet, Daily    naproxen (NAPROSYN) 500 mg, 3 times daily (morning, midday, late afternoon)    pantoprazole (ProtoNix) 40 mg EC tablet TAKE 1 TABLET BY MOUTH ONCE DAILY IN THE MORNING BEFORE A MEAL. DO NOT CRUSH, CHEW OR SPLIT TABLETS.    prednisoLONE acetate (Pred-Forte) 1 % ophthalmic suspension 1 drop, Left Eye, 4 times daily    tadalafil (CIALIS) 5 mg, oral, Daily PRN    turmeric-turmeric root extract 450-50 mg capsule Take by mouth.         Visit Vitals  BP (!) 140/96 (BP Location: Left arm, Patient Position: Sitting)   Pulse 75   Ht 1.753 m (5' 9\")   Wt 93.4 kg (206 lb)   SpO2 95%   BMI 30.42 kg/m²   Smoking " Status Former   BSA 2.13 m²      Physical Exam  Vitals reviewed.   Constitutional:       Appearance: Normal appearance.   HENT:      Head: Normocephalic.   Cardiovascular:      Rate and Rhythm: Normal rate and regular rhythm.   Pulmonary:      Effort: Pulmonary effort is normal. No respiratory distress.      Breath sounds: No wheezing.   Skin:     General: Skin is warm and dry.      Capillary Refill: Capillary refill takes less than 2 seconds.   Neurological:      Mental Status: He is alert.   Psychiatric:         Mood and Affect: Mood normal.           My Interpretation of Reviewed Study(s):  Results  [id number] Score  Age <65: 0   Sex Male: 0   CHF History No: 0   HTN Yes: 1   Stroke/TIA/Thromboembolism No: 0   Vascular Dz: CAD/PAD/Aortic Plaque No: 0   DM No: 0   Total Score 1     DIAGNOSTIC  Echocardiogram (10/2019): Normal heart function, EF 63%, mildly dilated RV, mildly dilated LA, no other valvular abnormalities        Assessment & Plan  Paroxysmal atrial fibrillation  AF Dx History: 2000; h/o Cardioversion: Yes; AAD Use: Flecainide 100mg BID (current); Anticoagulation use: Warfarin (stopped due to post ablation); h/o Ablation: 2019; [id number] Score: 1  Successfully managed with ablation in 2019, currently asymptomatic with no episodes since ablation. Low stroke risk due to controlled hypertension and lack of additional risk factors. Current annual stroke risk is 0.9%-1.3%, similar to bleeding risk. As age increases, particularly after 65, stroke risk may surpass bleeding risk, necessitating reevaluation of anticoagulation needs.  - Continue flecainide 100 mg twice daily  - Discuss anticoagulation and loop recorder as he approaches age 64    Premature Ventricular Contractions (PVCs)  Previously symptomatic but well-controlled with flecainide. No current symptoms or significant PVC burden on recent monitoring.  - Continue flecainide 100 mg twice daily    Hypertension  Well-controlled with current  medication regimen. Recent increase in blood pressure likely due to cessation of NSAIDs and associated back pain.  - Continue losartan 100 mg daily  - Continue metoprolol 25 mg twice daily  - Continue chlorothiazide 25 mg daily    Return to Clinic: Patient should return to the EP Clinic in 1 year    Jonh Nick MD [facility]  Cardiac Electrophysiology  [email or web address]    **Disclaimer: This note was dictated by speech recognition, and every effort has been made to prevent any error in transcription, however minor errors may be present**    This medical note was created with the assistance of artificial intelligence (AI) for documentation purposes. The content has been reviewed and confirmed by the healthcare provider for accuracy and completeness. Patient consented to the use of audio recording and use of AI during their visit.

## 2025-07-07 ENCOUNTER — TELEPHONE (OUTPATIENT)
Facility: CLINIC | Age: 60
End: 2025-07-07
Payer: COMMERCIAL

## 2025-07-08 ENCOUNTER — ANESTHESIA EVENT (OUTPATIENT)
Dept: OPERATING ROOM | Facility: HOSPITAL | Age: 60
End: 2025-07-08
Payer: COMMERCIAL

## 2025-07-08 NOTE — PROGRESS NOTES
Pharmacy Medication History Review    Kvng June is a 60 y.o. male who is planned to be admitted for No Principal Problem: There is no principal problem currently on the Problem List. Please update the Problem List and refresh.. Pharmacy called the patient prior to their scheduled procedure and reviewed the patient's feoes-sy-bjubowagi medications for accuracy.    Medications ADDED:  none  Medications CHANGED:  Prednisolone 1% directions from #1OSQID to #1 drop into affected eye as needed for irritation  Medications REMOVED:   none    Please review updated prior to admission medication list and comments regarding how patient may be taking medications differently by going to Admission tab --> Admission Orders --> Admit Orders / Review prior to admission medications.     Preferred pharmacy, last doses of medications, and allergies to be confirmed with patient by nursing the day of procedure.     Sources used to complete the med history include:  [facility]  Pharmacy dispense history  Patient Interview Poor historian  Chart Review  Care Everywhere     Below are additional concerns with the patient's PTA list.  Patient states they use their prenisolone eye drop as needed for irritation / flare up. Does not use daily. L.F. 11/13/24 5ml/20d    Lizette Aceves [facility]  Please reach out via Secure Chat for questions

## 2025-07-09 ENCOUNTER — ANESTHESIA (OUTPATIENT)
Dept: OPERATING ROOM | Facility: HOSPITAL | Age: 60
End: 2025-07-09
Payer: COMMERCIAL

## 2025-07-09 ENCOUNTER — APPOINTMENT (OUTPATIENT)
Dept: RADIOLOGY | Facility: HOSPITAL | Age: 60
DRG: 427 | End: 2025-07-09
Payer: COMMERCIAL

## 2025-07-09 ENCOUNTER — HOSPITAL ENCOUNTER (INPATIENT)
Facility: HOSPITAL | Age: 60
DRG: 427 | End: 2025-07-09
Attending: NEUROLOGICAL SURGERY | Admitting: NEUROLOGICAL SURGERY
Payer: COMMERCIAL

## 2025-07-09 DIAGNOSIS — E88.2 EPIDURAL LIPOMATOSIS: ICD-10-CM

## 2025-07-09 DIAGNOSIS — Z98.1 S/P LUMBAR SPINAL FUSION: ICD-10-CM

## 2025-07-09 DIAGNOSIS — R00.2 PALPITATIONS: ICD-10-CM

## 2025-07-09 DIAGNOSIS — M48.061 DEGENERATIVE LUMBAR SPINAL STENOSIS: ICD-10-CM

## 2025-07-09 DIAGNOSIS — G89.18 ACUTE POST-OPERATIVE PAIN: ICD-10-CM

## 2025-07-09 DIAGNOSIS — M41.50 DEGENERATIVE SCOLIOSIS IN ADULT PATIENT: Primary | ICD-10-CM

## 2025-07-09 DIAGNOSIS — S32.009K PSEUDOARTHROSIS OF LUMBAR SPINE: ICD-10-CM

## 2025-07-09 DIAGNOSIS — Z74.09 IMPAIRED FUNCTIONAL MOBILITY, BALANCE, AND ENDURANCE: ICD-10-CM

## 2025-07-09 DIAGNOSIS — M54.16 LUMBAR RADICULOPATHY: ICD-10-CM

## 2025-07-09 PROBLEM — N18.31 CHRONIC RENAL IMPAIRMENT, STAGE 3A (MULTI): Status: ACTIVE | Noted: 2025-07-09

## 2025-07-09 PROBLEM — M19.90 OSTEOARTHRITIS: Status: ACTIVE | Noted: 2025-07-09

## 2025-07-09 PROBLEM — M51.9 LUMBAR DISC DISEASE: Status: ACTIVE | Noted: 2025-07-09

## 2025-07-09 PROBLEM — N18.9 CHRONIC RENAL INSUFFICIENCY: Status: ACTIVE | Noted: 2025-07-09

## 2025-07-09 LAB
ALBUMIN SERPL BCP-MCNC: 3.7 G/DL (ref 3.4–5)
ALP SERPL-CCNC: 38 U/L (ref 33–136)
ALT SERPL W P-5'-P-CCNC: 31 U/L (ref 10–52)
ANION GAP BLDA CALCULATED.4IONS-SCNC: 10 MMO/L (ref 10–25)
ANION GAP BLDA CALCULATED.4IONS-SCNC: 10 MMO/L (ref 10–25)
ANION GAP BLDA CALCULATED.4IONS-SCNC: 12 MMO/L (ref 10–25)
ANION GAP BLDA CALCULATED.4IONS-SCNC: 12 MMO/L (ref 10–25)
ANION GAP BLDA CALCULATED.4IONS-SCNC: 13 MMO/L (ref 10–25)
ANION GAP BLDA CALCULATED.4IONS-SCNC: 9 MMO/L (ref 10–25)
ANION GAP SERPL CALC-SCNC: 16 MMOL/L (ref 10–20)
AST SERPL W P-5'-P-CCNC: 52 U/L (ref 9–39)
BASE EXCESS BLDA CALC-SCNC: -0.4 MMOL/L (ref -2–3)
BASE EXCESS BLDA CALC-SCNC: -2.2 MMOL/L (ref -2–3)
BASE EXCESS BLDA CALC-SCNC: -4.6 MMOL/L (ref -2–3)
BASE EXCESS BLDA CALC-SCNC: -5.3 MMOL/L (ref -2–3)
BASE EXCESS BLDA CALC-SCNC: 0.8 MMOL/L (ref -2–3)
BASE EXCESS BLDA CALC-SCNC: 0.8 MMOL/L (ref -2–3)
BILIRUB SERPL-MCNC: 0.8 MG/DL (ref 0–1.2)
BLOOD EXPIRATION DATE: NORMAL
BLOOD EXPIRATION DATE: NORMAL
BODY TEMPERATURE: 37 DEGREES CELSIUS
BUN SERPL-MCNC: 24 MG/DL (ref 6–23)
CA-I BLDA-SCNC: 1.04 MMOL/L (ref 1.1–1.33)
CA-I BLDA-SCNC: 1.13 MMOL/L (ref 1.1–1.33)
CA-I BLDA-SCNC: 1.19 MMOL/L (ref 1.1–1.33)
CA-I BLDA-SCNC: 1.26 MMOL/L (ref 1.1–1.33)
CA-I BLDA-SCNC: 1.27 MMOL/L (ref 1.1–1.33)
CA-I BLDA-SCNC: 1.28 MMOL/L (ref 1.1–1.33)
CALCIUM SERPL-MCNC: 7.8 MG/DL (ref 8.6–10.6)
CHLORIDE BLDA-SCNC: 100 MMOL/L (ref 98–107)
CHLORIDE BLDA-SCNC: 100 MMOL/L (ref 98–107)
CHLORIDE BLDA-SCNC: 101 MMOL/L (ref 98–107)
CHLORIDE BLDA-SCNC: 102 MMOL/L (ref 98–107)
CHLORIDE BLDA-SCNC: 102 MMOL/L (ref 98–107)
CHLORIDE BLDA-SCNC: 106 MMOL/L (ref 98–107)
CHLORIDE SERPL-SCNC: 100 MMOL/L (ref 98–107)
CO2 SERPL-SCNC: 21 MMOL/L (ref 21–32)
CREAT SERPL-MCNC: 1.33 MG/DL (ref 0.5–1.3)
DISPENSE STATUS: NORMAL
DISPENSE STATUS: NORMAL
EGFRCR SERPLBLD CKD-EPI 2021: 61 ML/MIN/1.73M*2
ERYTHROCYTE [DISTWIDTH] IN BLOOD BY AUTOMATED COUNT: 15.6 % (ref 11.5–14.5)
FIBRINOGEN PPP-MCNC: 189 MG/DL (ref 200–400)
GLUCOSE BLD MANUAL STRIP-MCNC: 156 MG/DL (ref 74–99)
GLUCOSE BLDA-MCNC: 152 MG/DL (ref 74–99)
GLUCOSE BLDA-MCNC: 155 MG/DL (ref 74–99)
GLUCOSE BLDA-MCNC: 160 MG/DL (ref 74–99)
GLUCOSE BLDA-MCNC: 161 MG/DL (ref 74–99)
GLUCOSE BLDA-MCNC: 165 MG/DL (ref 74–99)
GLUCOSE BLDA-MCNC: 182 MG/DL (ref 74–99)
GLUCOSE SERPL-MCNC: 177 MG/DL (ref 74–99)
HCO3 BLDA-SCNC: 20.2 MMOL/L (ref 22–26)
HCO3 BLDA-SCNC: 20.6 MMOL/L (ref 22–26)
HCO3 BLDA-SCNC: 22.4 MMOL/L (ref 22–26)
HCO3 BLDA-SCNC: 24.8 MMOL/L (ref 22–26)
HCO3 BLDA-SCNC: 25.3 MMOL/L (ref 22–26)
HCO3 BLDA-SCNC: 26 MMOL/L (ref 22–26)
HCT VFR BLD AUTO: 25 % (ref 41–52)
HCT VFR BLD EST: 23 % (ref 41–52)
HCT VFR BLD EST: 30 % (ref 41–52)
HCT VFR BLD EST: 32 % (ref 41–52)
HCT VFR BLD EST: 32 % (ref 41–52)
HCT VFR BLD EST: 35 % (ref 41–52)
HCT VFR BLD EST: 36 % (ref 41–52)
HGB BLD-MCNC: 8.8 G/DL (ref 13.5–17.5)
HGB BLDA-MCNC: 10.5 G/DL (ref 13.5–17.5)
HGB BLDA-MCNC: 10.8 G/DL (ref 13.5–17.5)
HGB BLDA-MCNC: 11.5 G/DL (ref 13.5–17.5)
HGB BLDA-MCNC: 11.9 G/DL (ref 13.5–17.5)
HGB BLDA-MCNC: 7.7 G/DL (ref 13.5–17.5)
HGB BLDA-MCNC: 9.9 G/DL (ref 13.5–17.5)
INHALED O2 CONCENTRATION: 21 %
INHALED O2 CONCENTRATION: 40 %
INHALED O2 CONCENTRATION: 45 %
INHALED O2 CONCENTRATION: 50 %
LACTATE BLDA-SCNC: 1 MMOL/L (ref 0.4–2)
LACTATE BLDA-SCNC: 1.1 MMOL/L (ref 0.4–2)
LACTATE BLDA-SCNC: 1.2 MMOL/L (ref 0.4–2)
LACTATE BLDA-SCNC: 1.2 MMOL/L (ref 0.4–2)
LACTATE BLDA-SCNC: 1.3 MMOL/L (ref 0.4–2)
LACTATE BLDA-SCNC: 1.5 MMOL/L (ref 0.4–2)
MCH RBC QN AUTO: 34 PG (ref 26–34)
MCHC RBC AUTO-ENTMCNC: 35.2 G/DL (ref 32–36)
MCV RBC AUTO: 97 FL (ref 80–100)
NRBC BLD-RTO: 0 /100 WBCS (ref 0–0)
OXYHGB MFR BLDA: 96 % (ref 94–98)
OXYHGB MFR BLDA: 96.1 % (ref 94–98)
OXYHGB MFR BLDA: 96.3 % (ref 94–98)
OXYHGB MFR BLDA: 96.4 % (ref 94–98)
OXYHGB MFR BLDA: 96.5 % (ref 94–98)
OXYHGB MFR BLDA: 97.1 % (ref 94–98)
PCO2 BLDA: 35 MM HG (ref 38–42)
PCO2 BLDA: 37 MM HG (ref 38–42)
PCO2 BLDA: 39 MM HG (ref 38–42)
PCO2 BLDA: 41 MM HG (ref 38–42)
PCO2 BLDA: 42 MM HG (ref 38–42)
PCO2 BLDA: 43 MM HG (ref 38–42)
PH BLDA: 7.31 PH (ref 7.38–7.42)
PH BLDA: 7.37 PH (ref 7.38–7.42)
PH BLDA: 7.38 PH (ref 7.38–7.42)
PH BLDA: 7.39 PH (ref 7.38–7.42)
PH BLDA: 7.39 PH (ref 7.38–7.42)
PH BLDA: 7.42 PH (ref 7.38–7.42)
PLATELET # BLD AUTO: 119 X10*3/UL (ref 150–450)
PO2 BLDA: 102 MM HG (ref 85–95)
PO2 BLDA: 119 MM HG (ref 85–95)
PO2 BLDA: 128 MM HG (ref 85–95)
PO2 BLDA: 157 MM HG (ref 85–95)
PO2 BLDA: 171 MM HG (ref 85–95)
PO2 BLDA: 97 MM HG (ref 85–95)
POTASSIUM BLDA-SCNC: 3.8 MMOL/L (ref 3.5–5.3)
POTASSIUM BLDA-SCNC: 3.9 MMOL/L (ref 3.5–5.3)
POTASSIUM BLDA-SCNC: 4 MMOL/L (ref 3.5–5.3)
POTASSIUM BLDA-SCNC: 4.1 MMOL/L (ref 3.5–5.3)
POTASSIUM BLDA-SCNC: 4.1 MMOL/L (ref 3.5–5.3)
POTASSIUM BLDA-SCNC: 4.4 MMOL/L (ref 3.5–5.3)
POTASSIUM SERPL-SCNC: 4 MMOL/L (ref 3.5–5.3)
PRODUCT BLOOD TYPE: 5100
PRODUCT BLOOD TYPE: 5100
PRODUCT CODE: NORMAL
PRODUCT CODE: NORMAL
PROT SERPL-MCNC: 5.2 G/DL (ref 6.4–8.2)
RBC # BLD AUTO: 2.59 X10*6/UL (ref 4.5–5.9)
SAO2 % BLDA: 100 % (ref 94–100)
SAO2 % BLDA: 100 % (ref 94–100)
SAO2 % BLDA: 99 % (ref 94–100)
SODIUM BLDA-SCNC: 131 MMOL/L (ref 136–145)
SODIUM BLDA-SCNC: 132 MMOL/L (ref 136–145)
SODIUM BLDA-SCNC: 132 MMOL/L (ref 136–145)
SODIUM BLDA-SCNC: 134 MMOL/L (ref 136–145)
SODIUM SERPL-SCNC: 133 MMOL/L (ref 136–145)
UNIT ABO: NORMAL
UNIT ABO: NORMAL
UNIT NUMBER: NORMAL
UNIT NUMBER: NORMAL
UNIT RH: NORMAL
UNIT RH: NORMAL
UNIT VOLUME: 284
UNIT VOLUME: 310
WBC # BLD AUTO: 19.7 X10*3/UL (ref 4.4–11.3)
XM INTEP: NORMAL
XM INTEP: NORMAL

## 2025-07-09 PROCEDURE — 2500000001 HC RX 250 WO HCPCS SELF ADMINISTERED DRUGS (ALT 637 FOR MEDICARE OP): Performed by: STUDENT IN AN ORGANIZED HEALTH CARE EDUCATION/TRAINING PROGRAM

## 2025-07-09 PROCEDURE — 63048 LAM FACETEC &FORAMOT EA ADDL: CPT | Performed by: NEUROLOGICAL SURGERY

## 2025-07-09 PROCEDURE — 3600000018 HC OR TIME - INITIAL BASE CHARGE - PROCEDURE LEVEL SIX: Performed by: NEUROLOGICAL SURGERY

## 2025-07-09 PROCEDURE — XRGF058 FUSION OF LEFT SACROILIAC JOINT USING INTERNAL FIXATION DEVICE WITH TULIP CONNECTOR, OPEN APPROACH, NEW TECHNOLOGY GROUP 8: ICD-10-PCS | Performed by: NEUROLOGICAL SURGERY

## 2025-07-09 PROCEDURE — 85027 COMPLETE CBC AUTOMATED: CPT | Performed by: ANESTHESIOLOGY

## 2025-07-09 PROCEDURE — 0RG70K1 FUSION OF 2 TO 7 THORACIC VERTEBRAL JOINTS WITH NONAUTOLOGOUS TISSUE SUBSTITUTE, POSTERIOR APPROACH, POSTERIOR COLUMN, OPEN APPROACH: ICD-10-PCS | Performed by: NEUROLOGICAL SURGERY

## 2025-07-09 PROCEDURE — 2500000004 HC RX 250 GENERAL PHARMACY W/ HCPCS (ALT 636 FOR OP/ED): Mod: JZ | Performed by: ANESTHESIOLOGY

## 2025-07-09 PROCEDURE — 0QP004Z REMOVAL OF INTERNAL FIXATION DEVICE FROM LUMBAR VERTEBRA, OPEN APPROACH: ICD-10-PCS | Performed by: NEUROLOGICAL SURGERY

## 2025-07-09 PROCEDURE — 63052 LAM FACETC/FRMT ARTHRD LUM 1: CPT | Performed by: NEUROLOGICAL SURGERY

## 2025-07-09 PROCEDURE — P9016 RBC LEUKOCYTES REDUCED: HCPCS

## 2025-07-09 PROCEDURE — 85384 FIBRINOGEN ACTIVITY: CPT | Performed by: STUDENT IN AN ORGANIZED HEALTH CARE EDUCATION/TRAINING PROGRAM

## 2025-07-09 PROCEDURE — 22853 INSJ BIOMECHANICAL DEVICE: CPT | Performed by: NEUROLOGICAL SURGERY

## 2025-07-09 PROCEDURE — 3700000001 HC GENERAL ANESTHESIA TIME - INITIAL BASE CHARGE: Performed by: NEUROLOGICAL SURGERY

## 2025-07-09 PROCEDURE — 0SG30AJ FUSION OF LUMBOSACRAL JOINT WITH INTERBODY FUSION DEVICE, POSTERIOR APPROACH, ANTERIOR COLUMN, OPEN APPROACH: ICD-10-PCS | Performed by: NEUROLOGICAL SURGERY

## 2025-07-09 PROCEDURE — 00NY0ZZ RELEASE LUMBAR SPINAL CORD, OPEN APPROACH: ICD-10-PCS | Performed by: NEUROLOGICAL SURGERY

## 2025-07-09 PROCEDURE — 8E0W0CZ ROBOTIC ASSISTED PROCEDURE OF TRUNK REGION, OPEN APPROACH: ICD-10-PCS | Performed by: NEUROLOGICAL SURGERY

## 2025-07-09 PROCEDURE — 2500000005 HC RX 250 GENERAL PHARMACY W/O HCPCS: Performed by: ANESTHESIOLOGY

## 2025-07-09 PROCEDURE — 27280 ARTHR SI JT OPN B1GRF INSTRM: CPT | Performed by: NEUROLOGICAL SURGERY

## 2025-07-09 PROCEDURE — P9045 ALBUMIN (HUMAN), 5%, 250 ML: HCPCS | Mod: JZ

## 2025-07-09 PROCEDURE — 63047 LAM FACETEC & FORAMOT LUMBAR: CPT | Performed by: NEUROLOGICAL SURGERY

## 2025-07-09 PROCEDURE — 01NB0ZZ RELEASE LUMBAR NERVE, OPEN APPROACH: ICD-10-PCS | Performed by: NEUROLOGICAL SURGERY

## 2025-07-09 PROCEDURE — 2500000005 HC RX 250 GENERAL PHARMACY W/O HCPCS: Performed by: NEUROLOGICAL SURGERY

## 2025-07-09 PROCEDURE — 2500000004 HC RX 250 GENERAL PHARMACY W/ HCPCS (ALT 636 FOR OP/ED)

## 2025-07-09 PROCEDURE — C1713 ANCHOR/SCREW BN/BN,TIS/BN: HCPCS | Performed by: NEUROLOGICAL SURGERY

## 2025-07-09 PROCEDURE — 84132 ASSAY OF SERUM POTASSIUM: CPT

## 2025-07-09 PROCEDURE — 3700000002 HC GENERAL ANESTHESIA TIME - EACH INCREMENTAL 1 MINUTE: Performed by: NEUROLOGICAL SURGERY

## 2025-07-09 PROCEDURE — 63053 LAM FACTC/FRMT ARTHRD LUM EA: CPT | Performed by: NEUROLOGICAL SURGERY

## 2025-07-09 PROCEDURE — C1889 IMPLANT/INSERT DEVICE, NOC: HCPCS | Performed by: NEUROLOGICAL SURGERY

## 2025-07-09 PROCEDURE — 7100000002 HC RECOVERY ROOM TIME - EACH INCREMENTAL 1 MINUTE: Performed by: NEUROLOGICAL SURGERY

## 2025-07-09 PROCEDURE — 0SG10AJ FUSION OF 2 OR MORE LUMBAR VERTEBRAL JOINTS WITH INTERBODY FUSION DEVICE, POSTERIOR APPROACH, ANTERIOR COLUMN, OPEN APPROACH: ICD-10-PCS | Performed by: NEUROLOGICAL SURGERY

## 2025-07-09 PROCEDURE — XRGE058 FUSION OF RIGHT SACROILIAC JOINT USING INTERNAL FIXATION DEVICE WITH TULIP CONNECTOR, OPEN APPROACH, NEW TECHNOLOGY GROUP 8: ICD-10-PCS | Performed by: NEUROLOGICAL SURGERY

## 2025-07-09 PROCEDURE — 2500000004 HC RX 250 GENERAL PHARMACY W/ HCPCS (ALT 636 FOR OP/ED): Mod: JZ

## 2025-07-09 PROCEDURE — 2500000005 HC RX 250 GENERAL PHARMACY W/O HCPCS: Performed by: REGISTERED NURSE

## 2025-07-09 PROCEDURE — 2500000004 HC RX 250 GENERAL PHARMACY W/ HCPCS (ALT 636 FOR OP/ED): Performed by: STUDENT IN AN ORGANIZED HEALTH CARE EDUCATION/TRAINING PROGRAM

## 2025-07-09 PROCEDURE — 0RGA0K1 FUSION OF THORACOLUMBAR VERTEBRAL JOINT WITH NONAUTOLOGOUS TISSUE SUBSTITUTE, POSTERIOR APPROACH, POSTERIOR COLUMN, OPEN APPROACH: ICD-10-PCS | Performed by: NEUROLOGICAL SURGERY

## 2025-07-09 PROCEDURE — 22633 ARTHRD CMBN 1NTRSPC LUMBAR: CPT | Performed by: NEUROLOGICAL SURGERY

## 2025-07-09 PROCEDURE — 22843 INSERT SPINE FIXATION DEVICE: CPT | Performed by: NEUROLOGICAL SURGERY

## 2025-07-09 PROCEDURE — 36430 TRANSFUSION BLD/BLD COMPNT: CPT | Mod: GC

## 2025-07-09 PROCEDURE — 7100000001 HC RECOVERY ROOM TIME - INITIAL BASE CHARGE: Performed by: NEUROLOGICAL SURGERY

## 2025-07-09 PROCEDURE — 37799 UNLISTED PX VASCULAR SURGERY: CPT | Performed by: ANESTHESIOLOGY

## 2025-07-09 PROCEDURE — 2500000005 HC RX 250 GENERAL PHARMACY W/O HCPCS

## 2025-07-09 PROCEDURE — C1737 HC OR 278 NO HCPCS: HCPCS | Performed by: NEUROLOGICAL SURGERY

## 2025-07-09 PROCEDURE — 01NR0ZZ RELEASE SACRAL NERVE, OPEN APPROACH: ICD-10-PCS | Performed by: NEUROLOGICAL SURGERY

## 2025-07-09 PROCEDURE — 84132 ASSAY OF SERUM POTASSIUM: CPT | Performed by: ANESTHESIOLOGY

## 2025-07-09 PROCEDURE — 2720000007 HC OR 272 NO HCPCS: Performed by: NEUROLOGICAL SURGERY

## 2025-07-09 PROCEDURE — 82947 ASSAY GLUCOSE BLOOD QUANT: CPT

## 2025-07-09 PROCEDURE — C1781 MESH (IMPLANTABLE): HCPCS | Performed by: NEUROLOGICAL SURGERY

## 2025-07-09 PROCEDURE — 2500000004 HC RX 250 GENERAL PHARMACY W/ HCPCS (ALT 636 FOR OP/ED): Performed by: NEUROLOGICAL SURGERY

## 2025-07-09 PROCEDURE — 22614 ARTHRD PST TQ 1NTRSPC EA ADD: CPT | Performed by: NEUROLOGICAL SURGERY

## 2025-07-09 PROCEDURE — 2780000003 HC OR 278 NO HCPCS: Performed by: NEUROLOGICAL SURGERY

## 2025-07-09 PROCEDURE — 3600000017 HC OR TIME - EACH INCREMENTAL 1 MINUTE - PROCEDURE LEVEL SIX: Performed by: NEUROLOGICAL SURGERY

## 2025-07-09 PROCEDURE — 36620 INSERTION CATHETER ARTERY: CPT | Performed by: ANESTHESIOLOGY

## 2025-07-09 PROCEDURE — 22634 ARTHRD CMBN 1NTRSPC EA ADDL: CPT | Performed by: NEUROLOGICAL SURGERY

## 2025-07-09 PROCEDURE — 2020000001 HC ICU ROOM DAILY

## 2025-07-09 PROCEDURE — 61783 SCAN PROC SPINAL: CPT | Performed by: NEUROLOGICAL SURGERY

## 2025-07-09 PROCEDURE — 0ST40ZZ RESECTION OF LUMBOSACRAL DISC, OPEN APPROACH: ICD-10-PCS | Performed by: NEUROLOGICAL SURGERY

## 2025-07-09 DEVICE — IMPLANTABLE DEVICE: Type: IMPLANTABLE DEVICE | Site: SPINE LUMBAR | Status: FUNCTIONAL

## 2025-07-09 DEVICE — SCREW, SOLERA 5.5/6.0 ATS, 7.5 X 45: Type: IMPLANTABLE DEVICE | Site: BACK | Status: FUNCTIONAL

## 2025-07-09 DEVICE — SCREW, SOLERA 5.5/6.0 ATS, 7.5 X 55: Type: IMPLANTABLE DEVICE | Site: BACK | Status: FUNCTIONAL

## 2025-07-09 DEVICE — BONE GRAFT SUBSTITUTE - SILICATE SUBSTITUTED CALCIUM PHOSPHATE - 15.8ML PACK SIZE
Type: IMPLANTABLE DEVICE | Site: BACK | Status: FUNCTIONAL
Brand: ACTIFUSE SHAPE

## 2025-07-09 DEVICE — ALLOGRAFT, MAGNUS 5.5CC: Type: IMPLANTABLE DEVICE | Site: SPINE LUMBAR | Status: FUNCTIONAL

## 2025-07-09 DEVICE — SCREW SET, SOLERA VOYAGER, 5.5/6.0: Type: IMPLANTABLE DEVICE | Site: BACK | Status: FUNCTIONAL

## 2025-07-09 DEVICE — BONE GRAFT SUBSTITUTE - SILICATE SUBSTITUTED CALCIUM PHOSPHATE - 7.5ML PACK SIZE
Type: IMPLANTABLE DEVICE | Site: BACK | Status: FUNCTIONAL
Brand: ACTIFUSE MIS

## 2025-07-09 DEVICE — SCREW, SOLERA 5.5/6.0 ATS, 7.5 X 50: Type: IMPLANTABLE DEVICE | Site: BACK | Status: FUNCTIONAL

## 2025-07-09 DEVICE — IMPLANTABLE DEVICE: Type: IMPLANTABLE DEVICE | Site: BACK | Status: FUNCTIONAL

## 2025-07-09 DEVICE — SET SCREW, 5.5, TI NS BRK OFF: Type: IMPLANTABLE DEVICE | Site: BACK | Status: FUNCTIONAL

## 2025-07-09 DEVICE — SPACER, CATALYFT PL, 7MM, SHORT: Type: IMPLANTABLE DEVICE | Site: SPINE LUMBAR | Status: FUNCTIONAL

## 2025-07-09 RX ORDER — FENTANYL CITRATE 50 UG/ML
INJECTION, SOLUTION INTRAMUSCULAR; INTRAVENOUS AS NEEDED
Status: DISCONTINUED | OUTPATIENT
Start: 2025-07-09 | End: 2025-07-09

## 2025-07-09 RX ORDER — HYDROMORPHONE HCL/0.9% NACL/PF 15 MG/30ML
PATIENT CONTROLLED ANALGESIA SYRINGE INTRAVENOUS CONTINUOUS
Refills: 0 | Status: DISCONTINUED | OUTPATIENT
Start: 2025-07-09 | End: 2025-07-10

## 2025-07-09 RX ORDER — ONDANSETRON HYDROCHLORIDE 2 MG/ML
4 INJECTION, SOLUTION INTRAVENOUS ONCE AS NEEDED
Status: DISCONTINUED | OUTPATIENT
Start: 2025-07-09 | End: 2025-07-09 | Stop reason: SDUPTHER

## 2025-07-09 RX ORDER — PROMETHAZINE HYDROCHLORIDE 25 MG/1
25 SUPPOSITORY RECTAL EVERY 12 HOURS PRN
Status: DISCONTINUED | OUTPATIENT
Start: 2025-07-09 | End: 2025-07-14 | Stop reason: HOSPADM

## 2025-07-09 RX ORDER — OXYCODONE HYDROCHLORIDE 5 MG/1
10 TABLET ORAL EVERY 4 HOURS PRN
Status: DISCONTINUED | OUTPATIENT
Start: 2025-07-09 | End: 2025-07-14 | Stop reason: HOSPADM

## 2025-07-09 RX ORDER — CYCLOBENZAPRINE HCL 10 MG
10 TABLET ORAL 3 TIMES DAILY PRN
Status: DISCONTINUED | OUTPATIENT
Start: 2025-07-09 | End: 2025-07-14 | Stop reason: HOSPADM

## 2025-07-09 RX ORDER — OXYCODONE HYDROCHLORIDE 5 MG/1
5 TABLET ORAL EVERY 4 HOURS PRN
Refills: 0 | Status: DISCONTINUED | OUTPATIENT
Start: 2025-07-09 | End: 2025-07-09 | Stop reason: SDUPTHER

## 2025-07-09 RX ORDER — DEXTROSE 50 % IN WATER (D50W) INTRAVENOUS SYRINGE
12.5
Status: DISCONTINUED | OUTPATIENT
Start: 2025-07-09 | End: 2025-07-14 | Stop reason: HOSPADM

## 2025-07-09 RX ORDER — HYDROMORPHONE HYDROCHLORIDE 0.2 MG/ML
0.2 INJECTION INTRAMUSCULAR; INTRAVENOUS; SUBCUTANEOUS EVERY 5 MIN PRN
Status: DISCONTINUED | OUTPATIENT
Start: 2025-07-09 | End: 2025-07-09 | Stop reason: HOSPADM

## 2025-07-09 RX ORDER — LABETALOL HYDROCHLORIDE 5 MG/ML
5 INJECTION, SOLUTION INTRAVENOUS ONCE AS NEEDED
Status: DISCONTINUED | OUTPATIENT
Start: 2025-07-09 | End: 2025-07-09 | Stop reason: HOSPADM

## 2025-07-09 RX ORDER — ALBUTEROL SULFATE 0.83 MG/ML
2.5 SOLUTION RESPIRATORY (INHALATION) ONCE AS NEEDED
Status: DISCONTINUED | OUTPATIENT
Start: 2025-07-09 | End: 2025-07-09 | Stop reason: SDUPTHER

## 2025-07-09 RX ORDER — ALBUTEROL SULFATE 0.83 MG/ML
2.5 SOLUTION RESPIRATORY (INHALATION) ONCE AS NEEDED
Status: DISCONTINUED | OUTPATIENT
Start: 2025-07-09 | End: 2025-07-09 | Stop reason: HOSPADM

## 2025-07-09 RX ORDER — MEPERIDINE HYDROCHLORIDE 25 MG/ML
12.5 INJECTION INTRAMUSCULAR; INTRAVENOUS; SUBCUTANEOUS EVERY 10 MIN PRN
Status: DISCONTINUED | OUTPATIENT
Start: 2025-07-09 | End: 2025-07-09 | Stop reason: SDUPTHER

## 2025-07-09 RX ORDER — SODIUM CHLORIDE, SODIUM LACTATE, POTASSIUM CHLORIDE, CALCIUM CHLORIDE 600; 310; 30; 20 MG/100ML; MG/100ML; MG/100ML; MG/100ML
125 INJECTION, SOLUTION INTRAVENOUS CONTINUOUS
Status: ACTIVE | OUTPATIENT
Start: 2025-07-09 | End: 2025-07-09

## 2025-07-09 RX ORDER — LIDOCAINE HYDROCHLORIDE AND EPINEPHRINE 5; 5 MG/ML; UG/ML
INJECTION, SOLUTION INFILTRATION; PERINEURAL AS NEEDED
Status: DISCONTINUED | OUTPATIENT
Start: 2025-07-09 | End: 2025-07-09 | Stop reason: HOSPADM

## 2025-07-09 RX ORDER — VANCOMYCIN HYDROCHLORIDE 1 G/20ML
INJECTION, POWDER, LYOPHILIZED, FOR SOLUTION INTRAVENOUS AS NEEDED
Status: DISCONTINUED | OUTPATIENT
Start: 2025-07-09 | End: 2025-07-09

## 2025-07-09 RX ORDER — TRANEXAMIC ACID 10 MG/ML
INJECTION, SOLUTION INTRAVENOUS AS NEEDED
Status: DISCONTINUED | OUTPATIENT
Start: 2025-07-09 | End: 2025-07-09

## 2025-07-09 RX ORDER — HYDROMORPHONE HYDROCHLORIDE 1 MG/ML
0.2 INJECTION, SOLUTION INTRAMUSCULAR; INTRAVENOUS; SUBCUTANEOUS ONCE
Status: DISCONTINUED | OUTPATIENT
Start: 2025-07-09 | End: 2025-07-10

## 2025-07-09 RX ORDER — POLYMYXIN B 500000 [USP'U]/1
INJECTION, POWDER, LYOPHILIZED, FOR SOLUTION INTRAMUSCULAR; INTRATHECAL; INTRAVENOUS; OPHTHALMIC AS NEEDED
Status: DISCONTINUED | OUTPATIENT
Start: 2025-07-09 | End: 2025-07-09 | Stop reason: HOSPADM

## 2025-07-09 RX ORDER — PHENYLEPHRINE 10 MG/250 ML(40 MCG/ML)IN 0.9 % SOD.CHLORIDE INTRAVENOUS
CONTINUOUS PRN
Status: DISCONTINUED | OUTPATIENT
Start: 2025-07-09 | End: 2025-07-09

## 2025-07-09 RX ORDER — POLYETHYLENE GLYCOL 3350 17 G/17G
17 POWDER, FOR SOLUTION ORAL DAILY
Status: DISCONTINUED | OUTPATIENT
Start: 2025-07-10 | End: 2025-07-11

## 2025-07-09 RX ORDER — PROPOFOL 10 MG/ML
INJECTION, EMULSION INTRAVENOUS AS NEEDED
Status: DISCONTINUED | OUTPATIENT
Start: 2025-07-09 | End: 2025-07-09

## 2025-07-09 RX ORDER — METOCLOPRAMIDE HYDROCHLORIDE 5 MG/ML
10 INJECTION INTRAMUSCULAR; INTRAVENOUS ONCE AS NEEDED
Status: DISCONTINUED | OUTPATIENT
Start: 2025-07-09 | End: 2025-07-09 | Stop reason: HOSPADM

## 2025-07-09 RX ORDER — HYDROMORPHONE HYDROCHLORIDE 1 MG/ML
INJECTION, SOLUTION INTRAMUSCULAR; INTRAVENOUS; SUBCUTANEOUS AS NEEDED
Status: DISCONTINUED | OUTPATIENT
Start: 2025-07-09 | End: 2025-07-09

## 2025-07-09 RX ORDER — OXYCODONE HYDROCHLORIDE 5 MG/1
5 TABLET ORAL EVERY 4 HOURS PRN
Status: DISCONTINUED | OUTPATIENT
Start: 2025-07-09 | End: 2025-07-09 | Stop reason: HOSPADM

## 2025-07-09 RX ORDER — MIDAZOLAM HYDROCHLORIDE 1 MG/ML
INJECTION INTRAMUSCULAR; INTRAVENOUS AS NEEDED
Status: DISCONTINUED | OUTPATIENT
Start: 2025-07-09 | End: 2025-07-09

## 2025-07-09 RX ORDER — ACETAMINOPHEN 325 MG/1
650 TABLET ORAL EVERY 6 HOURS
Status: DISCONTINUED | OUTPATIENT
Start: 2025-07-09 | End: 2025-07-14 | Stop reason: HOSPADM

## 2025-07-09 RX ORDER — FENTANYL CITRATE 50 UG/ML
50 INJECTION, SOLUTION INTRAMUSCULAR; INTRAVENOUS EVERY 5 MIN PRN
Refills: 0 | Status: DISCONTINUED | OUTPATIENT
Start: 2025-07-09 | End: 2025-07-09 | Stop reason: HOSPADM

## 2025-07-09 RX ORDER — HYDRALAZINE HYDROCHLORIDE 20 MG/ML
10 INJECTION INTRAMUSCULAR; INTRAVENOUS EVERY 30 MIN PRN
Status: DISCONTINUED | OUTPATIENT
Start: 2025-07-09 | End: 2025-07-09 | Stop reason: HOSPADM

## 2025-07-09 RX ORDER — VANCOMYCIN HYDROCHLORIDE 1 G/20ML
INJECTION, POWDER, LYOPHILIZED, FOR SOLUTION INTRAVENOUS AS NEEDED
Status: DISCONTINUED | OUTPATIENT
Start: 2025-07-09 | End: 2025-07-09 | Stop reason: HOSPADM

## 2025-07-09 RX ORDER — OXYCODONE HYDROCHLORIDE 5 MG/1
2.5 TABLET ORAL EVERY 4 HOURS PRN
Status: DISCONTINUED | OUTPATIENT
Start: 2025-07-09 | End: 2025-07-14 | Stop reason: HOSPADM

## 2025-07-09 RX ORDER — ALBUMIN HUMAN 50 G/1000ML
25 SOLUTION INTRAVENOUS ONCE
Status: COMPLETED | OUTPATIENT
Start: 2025-07-09 | End: 2025-07-09

## 2025-07-09 RX ORDER — PROMETHAZINE HYDROCHLORIDE 25 MG/1
25 TABLET ORAL EVERY 6 HOURS PRN
Status: DISCONTINUED | OUTPATIENT
Start: 2025-07-09 | End: 2025-07-14 | Stop reason: HOSPADM

## 2025-07-09 RX ORDER — NOREPINEPHRINE BITARTRATE/D5W 8 MG/250ML
.01-1 PLASTIC BAG, INJECTION (ML) INTRAVENOUS CONTINUOUS
Status: DISCONTINUED | OUTPATIENT
Start: 2025-07-09 | End: 2025-07-10

## 2025-07-09 RX ORDER — FENTANYL CITRATE 50 UG/ML
25 INJECTION, SOLUTION INTRAMUSCULAR; INTRAVENOUS EVERY 5 MIN PRN
Status: DISCONTINUED | OUTPATIENT
Start: 2025-07-09 | End: 2025-07-09 | Stop reason: HOSPADM

## 2025-07-09 RX ORDER — LIDOCAINE HYDROCHLORIDE 20 MG/ML
INJECTION, SOLUTION INFILTRATION; PERINEURAL AS NEEDED
Status: DISCONTINUED | OUTPATIENT
Start: 2025-07-09 | End: 2025-07-09

## 2025-07-09 RX ORDER — CEFAZOLIN 1 G/1
INJECTION, POWDER, FOR SOLUTION INTRAVENOUS AS NEEDED
Status: DISCONTINUED | OUTPATIENT
Start: 2025-07-09 | End: 2025-07-09

## 2025-07-09 RX ORDER — MAGNESIUM SULFATE HEPTAHYDRATE 500 MG/ML
INJECTION, SOLUTION INTRAMUSCULAR; INTRAVENOUS AS NEEDED
Status: DISCONTINUED | OUTPATIENT
Start: 2025-07-09 | End: 2025-07-09

## 2025-07-09 RX ORDER — ONDANSETRON HYDROCHLORIDE 2 MG/ML
4 INJECTION, SOLUTION INTRAVENOUS ONCE AS NEEDED
Status: DISCONTINUED | OUTPATIENT
Start: 2025-07-09 | End: 2025-07-09 | Stop reason: HOSPADM

## 2025-07-09 RX ORDER — SODIUM CHLORIDE, SODIUM LACTATE, POTASSIUM CHLORIDE, CALCIUM CHLORIDE 600; 310; 30; 20 MG/100ML; MG/100ML; MG/100ML; MG/100ML
100 INJECTION, SOLUTION INTRAVENOUS CONTINUOUS
Status: DISCONTINUED | OUTPATIENT
Start: 2025-07-09 | End: 2025-07-09

## 2025-07-09 RX ORDER — SODIUM CHLORIDE 0.9 G/100ML
INJECTION, SOLUTION IRRIGATION AS NEEDED
Status: DISCONTINUED | OUTPATIENT
Start: 2025-07-09 | End: 2025-07-09 | Stop reason: HOSPADM

## 2025-07-09 RX ORDER — OXYCODONE HYDROCHLORIDE 5 MG/1
5 TABLET ORAL EVERY 4 HOURS PRN
Status: DISCONTINUED | OUTPATIENT
Start: 2025-07-09 | End: 2025-07-14 | Stop reason: HOSPADM

## 2025-07-09 RX ORDER — ONDANSETRON HYDROCHLORIDE 2 MG/ML
INJECTION, SOLUTION INTRAVENOUS AS NEEDED
Status: DISCONTINUED | OUTPATIENT
Start: 2025-07-09 | End: 2025-07-09

## 2025-07-09 RX ORDER — PHENYLEPHRINE HCL IN 0.9% NACL 0.4MG/10ML
SYRINGE (ML) INTRAVENOUS AS NEEDED
Status: DISCONTINUED | OUTPATIENT
Start: 2025-07-09 | End: 2025-07-09

## 2025-07-09 RX ORDER — ONDANSETRON HYDROCHLORIDE 2 MG/ML
4 INJECTION, SOLUTION INTRAVENOUS EVERY 8 HOURS PRN
Status: DISCONTINUED | OUTPATIENT
Start: 2025-07-09 | End: 2025-07-14 | Stop reason: HOSPADM

## 2025-07-09 RX ORDER — LIDOCAINE HYDROCHLORIDE 10 MG/ML
0.1 INJECTION, SOLUTION INFILTRATION; PERINEURAL ONCE
Status: DISCONTINUED | OUTPATIENT
Start: 2025-07-09 | End: 2025-07-09 | Stop reason: SDUPTHER

## 2025-07-09 RX ORDER — MEPERIDINE HYDROCHLORIDE 25 MG/ML
12.5 INJECTION INTRAMUSCULAR; INTRAVENOUS; SUBCUTANEOUS EVERY 10 MIN PRN
Status: DISCONTINUED | OUTPATIENT
Start: 2025-07-09 | End: 2025-07-09 | Stop reason: HOSPADM

## 2025-07-09 RX ORDER — HEPARIN SODIUM 5000 [USP'U]/ML
5000 INJECTION, SOLUTION INTRAVENOUS; SUBCUTANEOUS EVERY 8 HOURS
Status: DISCONTINUED | OUTPATIENT
Start: 2025-07-09 | End: 2025-07-14 | Stop reason: HOSPADM

## 2025-07-09 RX ORDER — DEXTROSE 50 % IN WATER (D50W) INTRAVENOUS SYRINGE
25
Status: DISCONTINUED | OUTPATIENT
Start: 2025-07-09 | End: 2025-07-14 | Stop reason: HOSPADM

## 2025-07-09 RX ORDER — NALOXONE HYDROCHLORIDE 0.4 MG/ML
0.2 INJECTION, SOLUTION INTRAMUSCULAR; INTRAVENOUS; SUBCUTANEOUS EVERY 5 MIN PRN
Status: DISCONTINUED | OUTPATIENT
Start: 2025-07-09 | End: 2025-07-14 | Stop reason: HOSPADM

## 2025-07-09 RX ORDER — HYDROMORPHONE HYDROCHLORIDE 0.2 MG/ML
0.2 INJECTION INTRAMUSCULAR; INTRAVENOUS; SUBCUTANEOUS EVERY 4 HOURS PRN
Status: CANCELLED | OUTPATIENT
Start: 2025-07-09

## 2025-07-09 RX ORDER — LIDOCAINE HYDROCHLORIDE 10 MG/ML
0.1 INJECTION, SOLUTION INFILTRATION; PERINEURAL ONCE
Status: DISCONTINUED | OUTPATIENT
Start: 2025-07-09 | End: 2025-07-09 | Stop reason: HOSPADM

## 2025-07-09 RX ORDER — ALBUMIN HUMAN 50 G/1000ML
SOLUTION INTRAVENOUS AS NEEDED
Status: DISCONTINUED | OUTPATIENT
Start: 2025-07-09 | End: 2025-07-09

## 2025-07-09 RX ORDER — NALOXONE HYDROCHLORIDE 0.4 MG/ML
0.2 INJECTION, SOLUTION INTRAMUSCULAR; INTRAVENOUS; SUBCUTANEOUS AS NEEDED
Status: DISCONTINUED | OUTPATIENT
Start: 2025-07-09 | End: 2025-07-11

## 2025-07-09 RX ORDER — SODIUM CHLORIDE 9 MG/ML
100 INJECTION, SOLUTION INTRAVENOUS CONTINUOUS
Status: ACTIVE | OUTPATIENT
Start: 2025-07-09 | End: 2025-07-10

## 2025-07-09 RX ORDER — ONDANSETRON 4 MG/1
4 TABLET, FILM COATED ORAL EVERY 8 HOURS PRN
Status: DISCONTINUED | OUTPATIENT
Start: 2025-07-09 | End: 2025-07-14 | Stop reason: HOSPADM

## 2025-07-09 RX ORDER — HYDROMORPHONE HYDROCHLORIDE 0.2 MG/ML
INJECTION INTRAMUSCULAR; INTRAVENOUS; SUBCUTANEOUS
Status: COMPLETED
Start: 2025-07-09 | End: 2025-07-09

## 2025-07-09 RX ORDER — ROCURONIUM BROMIDE 10 MG/ML
INJECTION, SOLUTION INTRAVENOUS AS NEEDED
Status: DISCONTINUED | OUTPATIENT
Start: 2025-07-09 | End: 2025-07-09

## 2025-07-09 RX ORDER — ACETAMINOPHEN 10 MG/ML
1000 INJECTION, SOLUTION INTRAVENOUS ONCE
Status: COMPLETED | OUTPATIENT
Start: 2025-07-09 | End: 2025-07-09

## 2025-07-09 RX ORDER — AMOXICILLIN 250 MG
2 CAPSULE ORAL 2 TIMES DAILY
Status: DISCONTINUED | OUTPATIENT
Start: 2025-07-09 | End: 2025-07-14 | Stop reason: HOSPADM

## 2025-07-09 RX ADMIN — ALBUMIN HUMAN 250 ML: 0.05 INJECTION, SOLUTION INTRAVENOUS at 12:30

## 2025-07-09 RX ADMIN — Medication 80 MCG: at 10:12

## 2025-07-09 RX ADMIN — ROCURONIUM BROMIDE 20 MG: 10 INJECTION INTRAVENOUS at 17:08

## 2025-07-09 RX ADMIN — ROCURONIUM BROMIDE 70 MG: 10 INJECTION INTRAVENOUS at 09:00

## 2025-07-09 RX ADMIN — SODIUM CHLORIDE 100 ML/HR: 0.9 INJECTION, SOLUTION INTRAVENOUS at 22:48

## 2025-07-09 RX ADMIN — ROCURONIUM BROMIDE 20 MG: 10 INJECTION INTRAVENOUS at 17:51

## 2025-07-09 RX ADMIN — ROCURONIUM BROMIDE 20 MG: 10 INJECTION INTRAVENOUS at 12:55

## 2025-07-09 RX ADMIN — ONDANSETRON 4 MG: 2 INJECTION, SOLUTION INTRAMUSCULAR; INTRAVENOUS at 19:18

## 2025-07-09 RX ADMIN — TRANEXAMIC ACID 1876 MG: 10 INJECTION, SOLUTION INTRAVENOUS at 09:43

## 2025-07-09 RX ADMIN — Medication 80 MCG: at 16:30

## 2025-07-09 RX ADMIN — LIDOCAINE HYDROCHLORIDE 100 MG: 20 INJECTION, SOLUTION INFILTRATION; PERINEURAL at 08:59

## 2025-07-09 RX ADMIN — CEFAZOLIN 2 G: 330 INJECTION, POWDER, FOR SOLUTION INTRAMUSCULAR; INTRAVENOUS at 17:44

## 2025-07-09 RX ADMIN — ALBUMIN HUMAN 500 ML: 0.05 INJECTION, SOLUTION INTRAVENOUS at 20:12

## 2025-07-09 RX ADMIN — SODIUM CHLORIDE, SODIUM LACTATE, POTASSIUM CHLORIDE, AND CALCIUM CHLORIDE: 600; 310; 30; 20 INJECTION, SOLUTION INTRAVENOUS at 08:53

## 2025-07-09 RX ADMIN — Medication 0.2 MG/KG/HR: at 09:53

## 2025-07-09 RX ADMIN — NOREPINEPHRINE BITARTRATE 0.01 MCG/KG/MIN: 8 INJECTION, SOLUTION INTRAVENOUS at 23:39

## 2025-07-09 RX ADMIN — Medication 120 MCG: at 19:32

## 2025-07-09 RX ADMIN — HYDROMORPHONE HYDROCHLORIDE 0.2 MG: 0.2 INJECTION, SOLUTION INTRAMUSCULAR; INTRAVENOUS; SUBCUTANEOUS at 20:26

## 2025-07-09 RX ADMIN — ACETAMINOPHEN 1000 MG: 10 INJECTION INTRAVENOUS at 20:26

## 2025-07-09 RX ADMIN — Medication 80 MCG: at 19:26

## 2025-07-09 RX ADMIN — HYDROMORPHONE HYDROCHLORIDE 0.5 MG: 1 INJECTION, SOLUTION INTRAMUSCULAR; INTRAVENOUS; SUBCUTANEOUS at 20:57

## 2025-07-09 RX ADMIN — ROCURONIUM BROMIDE 20 MG: 10 INJECTION INTRAVENOUS at 13:42

## 2025-07-09 RX ADMIN — SODIUM CHLORIDE, SODIUM LACTATE, POTASSIUM CHLORIDE, AND CALCIUM CHLORIDE 100 ML/HR: .6; .31; .03; .02 INJECTION, SOLUTION INTRAVENOUS at 20:51

## 2025-07-09 RX ADMIN — SODIUM CHLORIDE, SODIUM LACTATE, POTASSIUM CHLORIDE, AND CALCIUM CHLORIDE: 600; 310; 30; 20 INJECTION, SOLUTION INTRAVENOUS at 12:54

## 2025-07-09 RX ADMIN — SODIUM CHLORIDE: 9 INJECTION, SOLUTION INTRAVENOUS at 13:57

## 2025-07-09 RX ADMIN — ROCURONIUM BROMIDE 20 MG: 10 INJECTION INTRAVENOUS at 15:27

## 2025-07-09 RX ADMIN — ACETAMINOPHEN 650 MG: 325 TABLET ORAL at 22:47

## 2025-07-09 RX ADMIN — HYDROMORPHONE HYDROCHLORIDE 0.2 MG: 0.2 INJECTION, SOLUTION INTRAMUSCULAR; INTRAVENOUS; SUBCUTANEOUS at 21:59

## 2025-07-09 RX ADMIN — PROPOFOL 20 MG: 10 INJECTION, EMULSION INTRAVENOUS at 09:28

## 2025-07-09 RX ADMIN — Medication 80 MCG: at 19:34

## 2025-07-09 RX ADMIN — Medication 40 MCG: at 11:25

## 2025-07-09 RX ADMIN — Medication 120 MCG: at 09:20

## 2025-07-09 RX ADMIN — ALBUMIN HUMAN 25 G: 0.05 INJECTION, SOLUTION INTRAVENOUS at 19:52

## 2025-07-09 RX ADMIN — CEFAZOLIN 2 G: 330 INJECTION, POWDER, FOR SOLUTION INTRAMUSCULAR; INTRAVENOUS at 09:47

## 2025-07-09 RX ADMIN — FENTANYL CITRATE 100 MCG: 50 INJECTION, SOLUTION INTRAMUSCULAR; INTRAVENOUS at 08:59

## 2025-07-09 RX ADMIN — ALBUMIN HUMAN 250 ML: 0.05 INJECTION, SOLUTION INTRAVENOUS at 13:06

## 2025-07-09 RX ADMIN — OXYCODONE HYDROCHLORIDE 10 MG: 5 TABLET ORAL at 22:47

## 2025-07-09 RX ADMIN — MAGNESIUM SULFATE HEPTAHYDRATE 4 G: 500 INJECTION, SOLUTION INTRAMUSCULAR; INTRAVENOUS at 11:06

## 2025-07-09 RX ADMIN — SODIUM CHLORIDE, SODIUM LACTATE, POTASSIUM CHLORIDE, AND CALCIUM CHLORIDE: 600; 310; 30; 20 INJECTION, SOLUTION INTRAVENOUS at 19:17

## 2025-07-09 RX ADMIN — ROCURONIUM BROMIDE 20 MG: 10 INJECTION INTRAVENOUS at 10:58

## 2025-07-09 RX ADMIN — HYDROMORPHONE HYDROCHLORIDE 0.5 MG: 1 INJECTION, SOLUTION INTRAMUSCULAR; INTRAVENOUS; SUBCUTANEOUS at 18:08

## 2025-07-09 RX ADMIN — ROCURONIUM BROMIDE 20 MG: 10 INJECTION INTRAVENOUS at 11:45

## 2025-07-09 RX ADMIN — MIDAZOLAM HYDROCHLORIDE 2 MG: 2 INJECTION, SOLUTION INTRAMUSCULAR; INTRAVENOUS at 08:56

## 2025-07-09 RX ADMIN — Medication 120 MCG: at 18:10

## 2025-07-09 RX ADMIN — SODIUM CHLORIDE, SODIUM LACTATE, POTASSIUM CHLORIDE, AND CALCIUM CHLORIDE: 600; 310; 30; 20 INJECTION, SOLUTION INTRAVENOUS at 19:12

## 2025-07-09 RX ADMIN — POVIDONE-IODINE 1 APPLICATION: 5 SOLUTION TOPICAL at 08:00

## 2025-07-09 RX ADMIN — ROCURONIUM BROMIDE 20 MG: 10 INJECTION INTRAVENOUS at 10:11

## 2025-07-09 RX ADMIN — CEFAZOLIN 2 G: 330 INJECTION, POWDER, FOR SOLUTION INTRAMUSCULAR; INTRAVENOUS at 13:47

## 2025-07-09 RX ADMIN — Medication 80 MCG: at 11:49

## 2025-07-09 RX ADMIN — DEXAMETHASONE SODIUM PHOSPHATE 8 MG: 4 INJECTION INTRA-ARTICULAR; INTRALESIONAL; INTRAMUSCULAR; INTRAVENOUS; SOFT TISSUE at 09:42

## 2025-07-09 RX ADMIN — PHENYLEPHRINE-NACL IV SOLUTION 10 MG/250ML-0.9% 0.3 MCG/KG/MIN: 10-0.9/25 SOLUTION at 12:27

## 2025-07-09 RX ADMIN — PROPOFOL 160 MG: 10 INJECTION, EMULSION INTRAVENOUS at 08:59

## 2025-07-09 RX ADMIN — SUGAMMADEX 200 MG: 100 INJECTION, SOLUTION INTRAVENOUS at 19:22

## 2025-07-09 RX ADMIN — Medication 2 L/MIN: at 21:09

## 2025-07-09 RX ADMIN — ROCURONIUM BROMIDE 20 MG: 10 INJECTION INTRAVENOUS at 16:27

## 2025-07-09 RX ADMIN — Medication 120 MCG: at 18:28

## 2025-07-09 RX ADMIN — ROCURONIUM BROMIDE 10 MG: 10 INJECTION INTRAVENOUS at 18:30

## 2025-07-09 RX ADMIN — Medication 80 MCG: at 18:49

## 2025-07-09 RX ADMIN — Medication 80 MCG: at 12:12

## 2025-07-09 RX ADMIN — VANCOMYCIN HYDROCHLORIDE 1500 MG: 1025 INJECTION, POWDER, FOR SOLUTION INTRAVENOUS; ORAL at 09:32

## 2025-07-09 RX ADMIN — TRANEXAMIC ACID 2 MG/KG/HR: 100 INJECTION INTRAVENOUS at 10:13

## 2025-07-09 SDOH — HEALTH STABILITY: MENTAL HEALTH: CURRENT SMOKER: 0

## 2025-07-09 ASSESSMENT — PAIN SCALES - GENERAL
PAINLEVEL_OUTOF10: 7
PAINLEVEL_OUTOF10: 8
PAINLEVEL_OUTOF10: 8
PAINLEVEL_OUTOF10: 7
PAINLEVEL_OUTOF10: 7
PAINLEVEL_OUTOF10: 6
PAINLEVEL_OUTOF10: 6
PAINLEVEL_OUTOF10: 7
PAINLEVEL_OUTOF10: 8
PAINLEVEL_OUTOF10: 7
PAINLEVEL_OUTOF10: 7

## 2025-07-09 ASSESSMENT — PAIN - FUNCTIONAL ASSESSMENT
PAIN_FUNCTIONAL_ASSESSMENT: 0-10

## 2025-07-09 NOTE — ANESTHESIA PREPROCEDURE EVALUATION
"Patient: Kvng June    Procedure Information       Date/Time: 2515    Procedure: T10-pelvis revision/extension fusion with L1-2, L2-3, and L5-S1 laminectomies; L2-3, L4-5, and L5-S1 transforaminal lumbar interbody fusion and bilateral sacroiliac joint fusion - DOCTOR REQUESTS 8 HRS FOR THIS PROCEDURE  ALL  CPT CODES FOR THIS PROCEDURE  20343, 70660, 16949, 22853 x3, 63048 x1, 45639, 63053 x1, 31420, 22634 x2, 08438, 84822, 19302    Location: [facility] OR 23 / Virtual [facility] OR    Surgeons: Femi Rivera MD PhD     HPI:  59 y/o male scheduled for T10-pelvis revision/extension fusion with L1-2, L2-3, and L5-S1 laminectomies; L2-3, L4-5, and L5-S1 transforaminal lumbar interbody fusion and bilateral sacroiliac joint fusion. Patient has Lumbar radiculopathy 2/2 degenerative lumbar spinal stenosis, with a chronic pain disorder.      PMH: Anxiety, Depression, ADD, HTN, Paroxsymal Atrial Fibrillation, LEATHA (CPAP), Erectile Dysfunction, MORSE, GERD, diverticulosis, Degenerative scoliosis     Allergies:    Amlodipine   Venlafaxine    VS: 25  BP: 128/81  Pulse: 71  Resp: 15  SpO2: 99  Temp: 36.3 °C (97.3 °F)  Height: 1.753 m (5' 9\")  (25)  Weight: 93.8 kg (206 lb 12.7 oz)  (25)  BMI: 30.52  IBW: 70.7 kg (155 lb 15.1 oz)    [id number]  NSR 74; nl axis; NSIVCD vs I-RBBB    LABS:   K 4.3  BUN 27  Cr 1.47  Hgb A-1C  5.6  H/H 11.4/35.0  Plt 194     Relevant Problems   Anesthesia   (-) Malignant hyperthermia   (-) PONV (postoperative nausea and vomiting)      Cardiac   (+) Atrial fibrillation (Multi) (S/P Ablation. NOT on an anticoagulant)   (+) Hypertension, essential   (-) Chest pain   (-) MI (myocardial infarction) (Multi)      Neuro   (+) Anxiety disorder   (+) Cervical radiculitis   (+) Lumbar radiculopathy   (-) CVA (cerebral vascular accident) (Multi)   (-) Seizures (Multi)      /Renal   (+) Chronic renal impairment, stage 3a (Multi)      Endocrine   (+) Class 1 obesity " with body mass index (BMI) of 30.0 to 30.9 in adult   (-) Diabetes mellitus, type 2 (Multi)   (-) Hyperthyroidism   (-) Hypothyroidism      Musculoskeletal   (+) Chronic left-sided low back pain without sciatica   (+) Degenerative lumbar spinal stenosis   (+) Degenerative scoliosis in adult patient   (+) Idiopathic cervical spinal stenosis   (+) Lumbar disc disease   (+) Lumbar foraminal stenosis   (+) Osteoarthritis   (+) Spinal stenosis of lumbar region with neurogenic claudication      ID   (+) Herpes simplex   (+) Paronychia of finger of left hand       Clinical information reviewed:   Tobacco  Allergies  Meds  Problems  Med Hx  Surg Hx   Fam Hx          NPO Detail:  No data recorded     Physical Exam    Airway  Mallampati: I  TM distance: >3 FB  Neck ROM: limited  Mouth opening: 3 or more finger widths     Cardiovascular   Rhythm: regular  Rate: normal     Dental - normal exam     Pulmonary Breath sounds clear to auscultation     Abdominal            Anesthesia Plan    History of general anesthesia?: yes  History of complications of general anesthesia?: no    ASA 3     general   (Two large bore Ivs  Arterial Line  Type and XM 2u pRBCs)  The patient is not a current smoker.    intravenous induction   Postoperative pain plan includes opioids.  Trial extubation is planned.  Anesthetic plan and risks discussed with patient.  Use of blood products discussed with patient who consented to blood products.    Plan discussed with CRNA, CAA and resident.

## 2025-07-09 NOTE — ANESTHESIA POSTPROCEDURE EVALUATION
Patient: Kvng June    Procedure Summary       Date: 07/09/25 Room / Location: [facility] OR 23 / Virtual [facility] [place] [place] [facility] OR    Anesthesia Start: 0853 Anesthesia Stop:     Procedure: T10-pelvis revision/extension fusion with L1-2, L2-3, and L5-S1 laminectomies; L2-3, L4-5, and L5-S1 transforaminal lumbar interbody fusion and bilateral sacroiliac joint fusion (Back) Diagnosis:       Degenerative scoliosis in adult patient      Pseudoarthrosis of lumbar spine      Epidural lipomatosis      Lumbar radiculopathy      Degenerative lumbar spinal stenosis      (Degenerative scoliosis in adult patient [M41.50])      (Pseudoarthrosis of lumbar spine [S32.009K])      (Epidural lipomatosis [E88.2])      (Lumbar radiculopathy [M54.16])      (Degenerative lumbar spinal stenosis [M48.061])    Surgeons: Femi Rivera MD PhD Responsible Provider: Jadon Raymond MD    Anesthesia Type: general ASA Status: 3            Anesthesia Type: general    Vitals Value Taken Time   /61 07/09/25 19:45   Temp 36.6 °C (97.9 °F) 07/09/25 19:30   Pulse 87 07/09/25 19:47   Resp 14 07/09/25 19:47   SpO2 100 % 07/09/25 19:47   Vitals shown include unfiled device data.    Anesthesia Post Evaluation    Patient location during evaluation: PACU  Patient participation: complete - patient participated  Level of consciousness: awake and alert  Pain management: adequate  Airway patency: patent  Cardiovascular status: hypotensive (active treatment with phenylephrine and albumin bolus)  Respiratory status: face mask and acceptable  Hydration status: hypovolemic  Postoperative Nausea and Vomiting: none        There were no known notable events for this encounter.

## 2025-07-09 NOTE — BRIEF OP NOTE
Date: 2025  OR Location: [facility] OR    Name: Kvng June, : 1965, Age: 60 y.o., MRN: [id number], Sex: male    Diagnosis  Pre-op Diagnosis      * Degenerative scoliosis in adult patient [M41.50]     * Pseudoarthrosis of lumbar spine [S32.009K]     * Epidural lipomatosis [E88.2]     * Lumbar radiculopathy [M54.16]     * Degenerative lumbar spinal stenosis [M48.061] Post-op Diagnosis     * Degenerative scoliosis in adult patient [M41.50]     * Pseudoarthrosis of lumbar spine [S32.009K]     * Epidural lipomatosis [E88.2]     * Lumbar radiculopathy [M54.16]     * Degenerative lumbar spinal stenosis [M48.061]     Procedures  T10-pelvis revision/extension fusion with L1-2, L2-3, and L5-S1 laminectomies; L2-3, L4-5, and L5-S1 transforaminal lumbar interbody fusion and bilateral sacroiliac joint fusion   - NM ARTHRODESIS COMBINED TQ 1NTRSPC LUMBAR    NM POSTERIOR SEGMENTAL INSTRUMENTATION 7-12 VRT SEG [05671]  NM ALLOGRAFT FOR SPINE SURGERY ONLY MORSELIZED []  NM AUTOGRAFT SPINE SURGERY LOCAL FROM SAME INCISION []  NM INSJ BIOMCHN DEV INTERVERTEBRAL DSC SPC W/ARTHRD [99487]  NM SCRUGGS FACETECTOMY&FORAMOT 1 VRT SGM EA ADDL SGM [99084]  NM SCRUGGS FACETEC/FORAMOT DRG ARTHRD LUMBAR 1 VRT SGM [87552]  NM SCRUGGS FACETEC/FORAMOT DRG ARTHRD LMBR EA ADDL SGM [89150]  NM ARTHRODESIS CMBN TQ 1NTRSPC EACH ADDITIONAL [31046]  NM OSTEOTOMY SPINE PST/PSTLAT APPR 1 VRT SGM LMBR [62220]  NM STEREOTACTIC COMPUTER ASSISTED PX SPINAL [23261]  NM ARTHRODESIS SI JT OPN W/OBTAINING B1 GRF INSTRMJ [77625]  Surgeons      * Femi Rivera - Primary    Resident/Fellow/Other Assistant:  Surgeons and Role:     * Salvador Womack MD - Resident - Assisting     * Vic Kulkarni MD - Resident - Assisting    Staff:   Circulator: Suzi Cristina Person: Esdras  Circulator: Patricia  Scrub Person: Manny Ernst Circulator: Niharika  Relief Scrub: Leesa  Relief Circulator: Kimberly  Relief Scrub: Emeterio  Relief Circulator:  Emeterio    Anesthesia Staff: Anesthesiologist: Renny Fierro MD; Cintia Ramirez MD; Jadon Raymond MD; Ike Flood MD  CRNA: JUAN Julien-CRNA  C-AA: MIKE Meehan  Anesthesia Resident: Luke Rothman MD; Michelle Schulz MD; Sydney Ring MD    Procedure Summary  Anesthesia: General  ASA: III  Estimated Blood Loss: 2100mL  Intra-op Medications:   Administrations occurring from 0815 to 1650 on 07/09/25:   Medication Name Total Dose   lidocaine-epinephrine (Xylocaine W/EPI) 0.5 %-1:200,000 injection 12 mL   polymyxin B injection 500,000 Units   sodium chloride 0.9 % irrigation solution 1,000 mL   albumin human bottle 5% 500 mL   ceFAZolin (Ancef) vial 1 g 4 g   dexAMETHasone (Decadron) 4 mg/mL IV Syringe 2 mL 8 mg   fentaNYL (Sublimaze) injection 50 mcg/mL 100 mcg   ketamine (Ketalar) 500 mg in dextrose 5% 250 mL (2 mg/mL) infusion 130.38 mg   LR bolus Cannot be calculated   lidocaine (Xylocaine) injection 2 % 100 mg   magnesium sulfate 50 % injection 4 g   midazolam PF (Versed) injection 1 mg/mL 2 mg   phenylephrine (Hiram-Synephrine) 10 mg in sodium chloride 0.9% 250 mL (0.04 mg/mL) infusion (premix) 6.76 mg   phenylephrine 40 mcg/mL syringe 10 mL 480 mcg   propofol (Diprivan) injection 10 mg/mL 180 mg   rocuronium (ZeMuron) 50 mg/5 mL injection 210 mg   NaCl 0.9 % bolus Cannot be calculated   tranexamic acid (Cyklokapron) 5,000 mg in sodium chloride 0.9% 250 mL (20 mg/mL) infusion 1,241.29 mg   tranexamic acid 1,000 mg/100 mL NS (premix) 1,876 mg   vancomycin 1 g 1,500 mg              Anesthesia Record               Intraprocedure I/O Totals          Intake    Transfuse RBC :30 Minutes 700.00 mL    Ketamine 0.00 mL    The total shown is the total volume documented since Anesthesia Start was filed.    LR bolus 3000.00 mL    NaCl 0.9 % bolus 250.00 mL    Tranexamic Acid 0.00 mL    The total shown is the total volume documented since Anesthesia Start was filed.    Phenylephrine Drip  0.00 mL    The total shown is the total volume documented since Anesthesia Start was filed.    Total Intake 3950 mL       Output    Urine 710 mL    Est. Blood Loss 2520 mL    Total Output 3230 mL       Net    Net Volume 720 mL          Specimen: No specimens collected               Findings: good restoration of lordosis    Complications:  None; patient tolerated the procedure well.     Disposition: PACU - hemodynamically stable.  Condition: stable  Specimens Collected: No specimens collected  Attending Attestation:     Femi Rivera  Phone Number: [phone]

## 2025-07-09 NOTE — ANESTHESIA PROCEDURE NOTES
Airway  Date/Time: 7/9/2025 9:05 AM  Reason: elective      Staffing  Performed: CAA   Authorized by: Ike Flood MD    Performed by: Ike Flood MD  Patient location during procedure: OR    Patient Condition  Indications for airway management: anesthesia  Patient position: neck leftin neutral position.  MILS not maintained throughout  Sedation level: deep     Final Airway Details   Preoxygenated: yes  Final airway type: endotracheal airway  Successful airway: ETT   Successful intubation technique: video laryngoscopy  Adjuncts used in placement: intubating stylet  Endotracheal tube insertion site: oral  Blade size: #4  ETT size (mm): 7.5  Cormack-Lehane Classification: grade I - full view of glottis  Placement verified by: chest auscultation and capnometry   Placement verification comments: (Video laryngoscopy)  Measured from: teeth  ETT to teeth (cm): 22  Number of attempts at approach: 1  Number of other approaches attempted: 0

## 2025-07-09 NOTE — ANESTHESIA PROCEDURE NOTES
Arterial Line:    Date/Time: 7/9/2025 9:27 AM    Staffing  Performed: CAA   Authorized by: Ike Flood MD    Performed by: Ike Flood MD    An arterial line was placed. Procedure performed using surface landmarks.in the OR for the following indication(s): continuous blood pressure monitoring and blood sampling needed.    A 20 gauge (size), 1 and 3/4 inch (length) (type) catheter was placed into the Right radial artery, secured by Tegaderm and esy on 1st attempt,   Seldinger technique used.  Events:  patient tolerated procedure well with no complications.

## 2025-07-10 LAB
ANION GAP BLDA CALCULATED.4IONS-SCNC: 10 MMO/L (ref 10–25)
ANION GAP SERPL CALC-SCNC: 11 MMOL/L (ref 10–20)
APTT PPP: 21 SECONDS (ref 26–36)
BASE EXCESS BLDA CALC-SCNC: -0.9 MMOL/L (ref -2–3)
BASO STIPL BLD QL SMEAR: PRESENT
BASOPHILS # BLD AUTO: 0.01 X10*3/UL (ref 0–0.1)
BASOPHILS # BLD AUTO: 0.01 X10*3/UL (ref 0–0.1)
BASOPHILS # BLD AUTO: 0.02 X10*3/UL (ref 0–0.1)
BASOPHILS # BLD MANUAL: 0 X10*3/UL (ref 0–0.1)
BASOPHILS NFR BLD AUTO: 0.1 %
BASOPHILS NFR BLD AUTO: 0.1 %
BASOPHILS NFR BLD AUTO: 0.2 %
BASOPHILS NFR BLD MANUAL: 0 %
BLASTS # BLD MANUAL: 0 X10*3/UL
BLASTS NFR BLD MANUAL: 0 %
BLOOD EXPIRATION DATE: NORMAL
BODY TEMPERATURE: ABNORMAL
BUN SERPL-MCNC: 26 MG/DL (ref 6–23)
CA-I BLD-SCNC: 1.12 MMOL/L (ref 1.1–1.33)
CA-I BLDA-SCNC: 1.07 MMOL/L (ref 1.1–1.33)
CALCIUM SERPL-MCNC: 7.7 MG/DL (ref 8.6–10.6)
CHLORIDE BLDA-SCNC: 100 MMOL/L (ref 98–107)
CHLORIDE SERPL-SCNC: 101 MMOL/L (ref 98–107)
CO2 SERPL-SCNC: 26 MMOL/L (ref 21–32)
CREAT SERPL-MCNC: 1.36 MG/DL (ref 0.5–1.3)
DISPENSE STATUS: NORMAL
EGFRCR SERPLBLD CKD-EPI 2021: 59 ML/MIN/1.73M*2
EOSINOPHIL # BLD AUTO: 0.02 X10*3/UL (ref 0–0.7)
EOSINOPHIL # BLD AUTO: 0.06 X10*3/UL (ref 0–0.7)
EOSINOPHIL # BLD AUTO: 0.11 X10*3/UL (ref 0–0.7)
EOSINOPHIL # BLD MANUAL: 0 X10*3/UL (ref 0–0.7)
EOSINOPHIL NFR BLD AUTO: 0.1 %
EOSINOPHIL NFR BLD AUTO: 0.4 %
EOSINOPHIL NFR BLD AUTO: 0.9 %
EOSINOPHIL NFR BLD MANUAL: 0 %
ERYTHROCYTE [DISTWIDTH] IN BLOOD BY AUTOMATED COUNT: 17.5 % (ref 11.5–14.5)
ERYTHROCYTE [DISTWIDTH] IN BLOOD BY AUTOMATED COUNT: 17.6 % (ref 11.5–14.5)
ERYTHROCYTE [DISTWIDTH] IN BLOOD BY AUTOMATED COUNT: 18.6 % (ref 11.5–14.5)
ERYTHROCYTE [DISTWIDTH] IN BLOOD BY AUTOMATED COUNT: 19.1 % (ref 11.5–14.5)
ERYTHROCYTE [DISTWIDTH] IN BLOOD BY AUTOMATED COUNT: 19.2 % (ref 11.5–14.5)
FIBRINOGEN PPP-MCNC: 218 MG/DL (ref 200–400)
FIBRINOGEN PPP-MCNC: 272 MG/DL (ref 200–400)
GLUCOSE BLD MANUAL STRIP-MCNC: 125 MG/DL (ref 74–99)
GLUCOSE BLD MANUAL STRIP-MCNC: 145 MG/DL (ref 74–99)
GLUCOSE BLD MANUAL STRIP-MCNC: 148 MG/DL (ref 74–99)
GLUCOSE BLD MANUAL STRIP-MCNC: 167 MG/DL (ref 74–99)
GLUCOSE BLDA-MCNC: 140 MG/DL (ref 74–99)
GLUCOSE SERPL-MCNC: 168 MG/DL (ref 74–99)
HCO3 BLDA-SCNC: 24.3 MMOL/L (ref 22–26)
HCT VFR BLD AUTO: 21 % (ref 41–52)
HCT VFR BLD AUTO: 21.2 % (ref 41–52)
HCT VFR BLD AUTO: 22.3 % (ref 41–52)
HCT VFR BLD AUTO: 23.1 % (ref 41–52)
HCT VFR BLD AUTO: 23.5 % (ref 41–52)
HCT VFR BLD EST: 23 % (ref 41–52)
HGB BLD-MCNC: 6.9 G/DL (ref 13.5–17.5)
HGB BLD-MCNC: 7 G/DL (ref 13.5–17.5)
HGB BLD-MCNC: 7.4 G/DL (ref 13.5–17.5)
HGB BLD-MCNC: 8 G/DL (ref 13.5–17.5)
HGB BLD-MCNC: 8 G/DL (ref 13.5–17.5)
HGB BLDA-MCNC: 7.5 G/DL (ref 13.5–17.5)
IMM GRANULOCYTES # BLD AUTO: 0.13 X10*3/UL (ref 0–0.7)
IMM GRANULOCYTES # BLD AUTO: 0.16 X10*3/UL (ref 0–0.7)
IMM GRANULOCYTES # BLD AUTO: 0.19 X10*3/UL (ref 0–0.7)
IMM GRANULOCYTES # BLD AUTO: 0.2 X10*3/UL (ref 0–0.7)
IMM GRANULOCYTES NFR BLD AUTO: 1 % (ref 0–0.9)
IMM GRANULOCYTES NFR BLD AUTO: 1.2 % (ref 0–0.9)
INHALED O2 CONCENTRATION: 21 %
INR PPP: 1.1 (ref 0.9–1.1)
LACTATE BLDA-SCNC: 1.2 MMOL/L (ref 0.4–2)
LYMPHOCYTES # BLD AUTO: 1.67 X10*3/UL (ref 1.2–4.8)
LYMPHOCYTES # BLD AUTO: 2.27 X10*3/UL (ref 1.2–4.8)
LYMPHOCYTES # BLD AUTO: 2.64 X10*3/UL (ref 1.2–4.8)
LYMPHOCYTES # BLD MANUAL: 2.28 X10*3/UL (ref 1.2–4.8)
LYMPHOCYTES NFR BLD AUTO: 13.4 %
LYMPHOCYTES NFR BLD AUTO: 14.4 %
LYMPHOCYTES NFR BLD AUTO: 15.9 %
LYMPHOCYTES NFR BLD MANUAL: 11.9 %
MAGNESIUM SERPL-MCNC: 1.86 MG/DL (ref 1.6–2.4)
MCH RBC QN AUTO: 32.6 PG (ref 26–34)
MCH RBC QN AUTO: 32.7 PG (ref 26–34)
MCH RBC QN AUTO: 32.8 PG (ref 26–34)
MCH RBC QN AUTO: 33.5 PG (ref 26–34)
MCH RBC QN AUTO: 34.3 PG (ref 26–34)
MCHC RBC AUTO-ENTMCNC: 32.5 G/DL (ref 32–36)
MCHC RBC AUTO-ENTMCNC: 33.2 G/DL (ref 32–36)
MCHC RBC AUTO-ENTMCNC: 33.3 G/DL (ref 32–36)
MCHC RBC AUTO-ENTMCNC: 34 G/DL (ref 32–36)
MCHC RBC AUTO-ENTMCNC: 34.6 G/DL (ref 32–36)
MCV RBC AUTO: 101 FL (ref 80–100)
MCV RBC AUTO: 103 FL (ref 80–100)
MCV RBC AUTO: 95 FL (ref 80–100)
MCV RBC AUTO: 98 FL (ref 80–100)
MCV RBC AUTO: 98 FL (ref 80–100)
METAMYELOCYTES # BLD MANUAL: 0 X10*3/UL
METAMYELOCYTES NFR BLD MANUAL: 0 %
MONOCYTES # BLD AUTO: 2.7 X10*3/UL (ref 0.1–1)
MONOCYTES # BLD AUTO: 3.62 X10*3/UL (ref 0.1–1)
MONOCYTES # BLD AUTO: 3.89 X10*3/UL (ref 0.1–1)
MONOCYTES # BLD MANUAL: 1.79 X10*3/UL (ref 0.1–1)
MONOCYTES NFR BLD AUTO: 21.7 %
MONOCYTES NFR BLD AUTO: 21.8 %
MONOCYTES NFR BLD AUTO: 24.7 %
MONOCYTES NFR BLD MANUAL: 9.3 %
MYELOCYTES # BLD MANUAL: 0 X10*3/UL
MYELOCYTES NFR BLD MANUAL: 0 %
NEUTROPHILS # BLD AUTO: 10.13 X10*3/UL (ref 1.2–7.7)
NEUTROPHILS # BLD AUTO: 7.82 X10*3/UL (ref 1.2–7.7)
NEUTROPHILS # BLD AUTO: 9.32 X10*3/UL (ref 1.2–7.7)
NEUTROPHILS # BLD MANUAL: 14.8 X10*3/UL (ref 1.2–7.7)
NEUTROPHILS NFR BLD AUTO: 59.2 %
NEUTROPHILS NFR BLD AUTO: 61.1 %
NEUTROPHILS NFR BLD AUTO: 62.8 %
NEUTS BAND # BLD MANUAL: 0 X10*3/UL (ref 0–0.7)
NEUTS BAND NFR BLD MANUAL: 0 %
NEUTS SEG # BLD MANUAL: 14.8 X10*3/UL (ref 1.2–7)
NEUTS SEG NFR BLD MANUAL: 77.1 %
NRBC BLD MANUAL-RTO: 0 % (ref 0–0)
NRBC BLD-RTO: 0 /100 WBCS (ref 0–0)
OXYHGB MFR BLDA: 95 % (ref 94–98)
PCO2 BLDA: 42 MM HG (ref 38–42)
PH BLDA: 7.37 PH (ref 7.38–7.42)
PHOSPHATE SERPL-MCNC: 3.7 MG/DL (ref 2.5–4.9)
PLASMA CELLS # BLD MANUAL: 0 X10*3/UL
PLASMA CELLS NFR BLD MANUAL: 0 %
PLATELET # BLD AUTO: 103 X10*3/UL (ref 150–450)
PLATELET # BLD AUTO: 103 X10*3/UL (ref 150–450)
PLATELET # BLD AUTO: 78 X10*3/UL (ref 150–450)
PLATELET # BLD AUTO: 87 X10*3/UL (ref 150–450)
PLATELET # BLD AUTO: 96 X10*3/UL (ref 150–450)
PO2 BLDA: 72 MM HG (ref 85–95)
POTASSIUM BLDA-SCNC: 3.4 MMOL/L (ref 3.5–5.3)
POTASSIUM SERPL-SCNC: 3.8 MMOL/L (ref 3.5–5.3)
PRODUCT BLOOD TYPE: 5100
PRODUCT CODE: NORMAL
PROMYELOCYTES # BLD MANUAL: 0 X10*3/UL
PROMYELOCYTES NFR BLD MANUAL: 0 %
PROTHROMBIN TIME: 12.2 SECONDS (ref 9.8–12.4)
RBC # BLD AUTO: 2.04 X10*6/UL (ref 4.5–5.9)
RBC # BLD AUTO: 2.11 X10*6/UL (ref 4.5–5.9)
RBC # BLD AUTO: 2.27 X10*6/UL (ref 4.5–5.9)
RBC # BLD AUTO: 2.39 X10*6/UL (ref 4.5–5.9)
RBC # BLD AUTO: 2.44 X10*6/UL (ref 4.5–5.9)
RBC MORPH BLD: ABNORMAL
SAO2 % BLDA: 97 % (ref 94–100)
SODIUM BLDA-SCNC: 131 MMOL/L (ref 136–145)
SODIUM SERPL-SCNC: 134 MMOL/L (ref 136–145)
TOTAL CELLS COUNTED BLD: 118
UNIT ABO: NORMAL
UNIT NUMBER: NORMAL
UNIT RH: NORMAL
UNIT VOLUME: 271
UNIT VOLUME: 315
UNIT VOLUME: 322
VARIANT LYMPHS # BLD MANUAL: 0.33 X10*3/UL (ref 0–0.5)
VARIANT LYMPHS NFR BLD: 1.7 %
WBC # BLD AUTO: 12.5 X10*3/UL (ref 4.4–11.3)
WBC # BLD AUTO: 15.7 X10*3/UL (ref 4.4–11.3)
WBC # BLD AUTO: 16.6 X10*3/UL (ref 4.4–11.3)
WBC # BLD AUTO: 19 X10*3/UL (ref 4.4–11.3)
WBC # BLD AUTO: 19.2 X10*3/UL (ref 4.4–11.3)
XM INTEP: NORMAL

## 2025-07-10 PROCEDURE — 84132 ASSAY OF SERUM POTASSIUM: CPT

## 2025-07-10 PROCEDURE — P9016 RBC LEUKOCYTES REDUCED: HCPCS

## 2025-07-10 PROCEDURE — 2500000004 HC RX 250 GENERAL PHARMACY W/ HCPCS (ALT 636 FOR OP/ED): Performed by: REGISTERED NURSE

## 2025-07-10 PROCEDURE — 83735 ASSAY OF MAGNESIUM: CPT

## 2025-07-10 PROCEDURE — 2500000001 HC RX 250 WO HCPCS SELF ADMINISTERED DRUGS (ALT 637 FOR MEDICARE OP)

## 2025-07-10 PROCEDURE — 37799 UNLISTED PX VASCULAR SURGERY: CPT | Performed by: STUDENT IN AN ORGANIZED HEALTH CARE EDUCATION/TRAINING PROGRAM

## 2025-07-10 PROCEDURE — 2500000004 HC RX 250 GENERAL PHARMACY W/ HCPCS (ALT 636 FOR OP/ED): Performed by: STUDENT IN AN ORGANIZED HEALTH CARE EDUCATION/TRAINING PROGRAM

## 2025-07-10 PROCEDURE — 97162 PT EVAL MOD COMPLEX 30 MIN: CPT | Mod: GP

## 2025-07-10 PROCEDURE — 85007 BL SMEAR W/DIFF WBC COUNT: CPT

## 2025-07-10 PROCEDURE — 2500000001 HC RX 250 WO HCPCS SELF ADMINISTERED DRUGS (ALT 637 FOR MEDICARE OP): Performed by: STUDENT IN AN ORGANIZED HEALTH CARE EDUCATION/TRAINING PROGRAM

## 2025-07-10 PROCEDURE — 2500000004 HC RX 250 GENERAL PHARMACY W/ HCPCS (ALT 636 FOR OP/ED)

## 2025-07-10 PROCEDURE — 2500000005 HC RX 250 GENERAL PHARMACY W/O HCPCS

## 2025-07-10 PROCEDURE — 85610 PROTHROMBIN TIME: CPT

## 2025-07-10 PROCEDURE — 36415 COLL VENOUS BLD VENIPUNCTURE: CPT

## 2025-07-10 PROCEDURE — 85027 COMPLETE CBC AUTOMATED: CPT

## 2025-07-10 PROCEDURE — 2500000002 HC RX 250 W HCPCS SELF ADMINISTERED DRUGS (ALT 637 FOR MEDICARE OP, ALT 636 FOR OP/ED)

## 2025-07-10 PROCEDURE — 82374 ASSAY BLOOD CARBON DIOXIDE: CPT | Performed by: STUDENT IN AN ORGANIZED HEALTH CARE EDUCATION/TRAINING PROGRAM

## 2025-07-10 PROCEDURE — 2020000001 HC ICU ROOM DAILY

## 2025-07-10 PROCEDURE — 85027 COMPLETE CBC AUTOMATED: CPT | Performed by: STUDENT IN AN ORGANIZED HEALTH CARE EDUCATION/TRAINING PROGRAM

## 2025-07-10 PROCEDURE — 85384 FIBRINOGEN ACTIVITY: CPT

## 2025-07-10 PROCEDURE — 99291 CRITICAL CARE FIRST HOUR: CPT | Performed by: REGISTERED NURSE

## 2025-07-10 PROCEDURE — 84100 ASSAY OF PHOSPHORUS: CPT

## 2025-07-10 PROCEDURE — 85025 COMPLETE CBC W/AUTO DIFF WBC: CPT

## 2025-07-10 PROCEDURE — 99291 CRITICAL CARE FIRST HOUR: CPT

## 2025-07-10 PROCEDURE — 97165 OT EVAL LOW COMPLEX 30 MIN: CPT | Mod: GO

## 2025-07-10 PROCEDURE — 85384 FIBRINOGEN ACTIVITY: CPT | Performed by: STUDENT IN AN ORGANIZED HEALTH CARE EDUCATION/TRAINING PROGRAM

## 2025-07-10 PROCEDURE — 82947 ASSAY GLUCOSE BLOOD QUANT: CPT

## 2025-07-10 PROCEDURE — 82330 ASSAY OF CALCIUM: CPT

## 2025-07-10 PROCEDURE — 36430 TRANSFUSION BLD/BLD COMPNT: CPT

## 2025-07-10 RX ORDER — BUPROPION HYDROCHLORIDE 150 MG/1
150 TABLET ORAL DAILY
Status: DISCONTINUED | OUTPATIENT
Start: 2025-07-10 | End: 2025-07-14 | Stop reason: HOSPADM

## 2025-07-10 RX ORDER — OXYCODONE HYDROCHLORIDE 5 MG/1
5 TABLET ORAL EVERY 6 HOURS PRN
Qty: 28 TABLET | Refills: 0 | Status: CANCELLED | OUTPATIENT
Start: 2025-07-10 | End: 2025-07-17

## 2025-07-10 RX ORDER — INSULIN LISPRO 100 [IU]/ML
0-5 INJECTION, SOLUTION INTRAVENOUS; SUBCUTANEOUS
Status: DISCONTINUED | OUTPATIENT
Start: 2025-07-10 | End: 2025-07-14 | Stop reason: HOSPADM

## 2025-07-10 RX ORDER — POLYETHYLENE GLYCOL 3350 17 G/17G
17 POWDER, FOR SOLUTION ORAL DAILY
Qty: 7 PACKET | Refills: 0 | Status: CANCELLED | OUTPATIENT
Start: 2025-07-11 | End: 2025-07-18

## 2025-07-10 RX ORDER — OXYCODONE HYDROCHLORIDE 5 MG/1
10 TABLET ORAL EVERY 4 HOURS PRN
Refills: 0 | Status: DISCONTINUED | OUTPATIENT
Start: 2025-07-10 | End: 2025-07-10

## 2025-07-10 RX ORDER — POTASSIUM CHLORIDE 1.5 G/1.58G
40 POWDER, FOR SOLUTION ORAL EVERY 6 HOURS PRN
Status: DISCONTINUED | OUTPATIENT
Start: 2025-07-10 | End: 2025-07-11

## 2025-07-10 RX ORDER — POTASSIUM CHLORIDE 20 MEQ/1
20 TABLET, EXTENDED RELEASE ORAL EVERY 6 HOURS PRN
Status: DISCONTINUED | OUTPATIENT
Start: 2025-07-10 | End: 2025-07-11

## 2025-07-10 RX ORDER — MAGNESIUM SULFATE HEPTAHYDRATE 40 MG/ML
2 INJECTION, SOLUTION INTRAVENOUS EVERY 6 HOURS PRN
Status: DISCONTINUED | OUTPATIENT
Start: 2025-07-10 | End: 2025-07-11

## 2025-07-10 RX ORDER — POTASSIUM CHLORIDE 20 MEQ/1
40 TABLET, EXTENDED RELEASE ORAL EVERY 6 HOURS PRN
Status: DISCONTINUED | OUTPATIENT
Start: 2025-07-10 | End: 2025-07-11

## 2025-07-10 RX ORDER — MAGNESIUM SULFATE HEPTAHYDRATE 40 MG/ML
4 INJECTION, SOLUTION INTRAVENOUS EVERY 6 HOURS PRN
Status: DISCONTINUED | OUTPATIENT
Start: 2025-07-10 | End: 2025-07-11

## 2025-07-10 RX ORDER — DEXTROSE 50 % IN WATER (D50W) INTRAVENOUS SYRINGE
12.5
Status: DISCONTINUED | OUTPATIENT
Start: 2025-07-10 | End: 2025-07-14 | Stop reason: HOSPADM

## 2025-07-10 RX ORDER — PANTOPRAZOLE SODIUM 20 MG/1
20 TABLET, DELAYED RELEASE ORAL
Status: DISCONTINUED | OUTPATIENT
Start: 2025-07-10 | End: 2025-07-14 | Stop reason: HOSPADM

## 2025-07-10 RX ORDER — FLECAINIDE ACETATE 100 MG/1
100 TABLET ORAL 2 TIMES DAILY
Status: DISCONTINUED | OUTPATIENT
Start: 2025-07-10 | End: 2025-07-14 | Stop reason: HOSPADM

## 2025-07-10 RX ORDER — POTASSIUM CHLORIDE 14.9 MG/ML
20 INJECTION INTRAVENOUS EVERY 6 HOURS PRN
Status: DISCONTINUED | OUTPATIENT
Start: 2025-07-10 | End: 2025-07-11

## 2025-07-10 RX ORDER — CALCIUM GLUCONATE 20 MG/ML
1 INJECTION, SOLUTION INTRAVENOUS EVERY 6 HOURS PRN
Status: DISCONTINUED | OUTPATIENT
Start: 2025-07-10 | End: 2025-07-11

## 2025-07-10 RX ORDER — HYDROMORPHONE HYDROCHLORIDE 1 MG/ML
0.2 INJECTION, SOLUTION INTRAMUSCULAR; INTRAVENOUS; SUBCUTANEOUS EVERY 4 HOURS PRN
Status: DISCONTINUED | OUTPATIENT
Start: 2025-07-10 | End: 2025-07-11

## 2025-07-10 RX ORDER — ACETAMINOPHEN 325 MG/1
650 TABLET ORAL EVERY 6 HOURS
Qty: 56 TABLET | Refills: 0 | Status: CANCELLED | OUTPATIENT
Start: 2025-07-10 | End: 2025-07-17

## 2025-07-10 RX ORDER — POTASSIUM CHLORIDE 1.5 G/1.58G
20 POWDER, FOR SOLUTION ORAL EVERY 6 HOURS PRN
Status: DISCONTINUED | OUTPATIENT
Start: 2025-07-10 | End: 2025-07-11

## 2025-07-10 RX ORDER — SODIUM CHLORIDE 9 MG/ML
100 INJECTION, SOLUTION INTRAVENOUS CONTINUOUS
Status: DISCONTINUED | OUTPATIENT
Start: 2025-07-10 | End: 2025-07-11

## 2025-07-10 RX ORDER — PREDNISOLONE ACETATE 10 MG/ML
1 SUSPENSION/ DROPS OPHTHALMIC 4 TIMES DAILY
Status: DISCONTINUED | OUTPATIENT
Start: 2025-07-10 | End: 2025-07-14 | Stop reason: HOSPADM

## 2025-07-10 RX ORDER — HYDROMORPHONE HCL/0.9% NACL/PF 15 MG/30ML
PATIENT CONTROLLED ANALGESIA SYRINGE INTRAVENOUS CONTINUOUS
Refills: 0 | Status: DISCONTINUED | OUTPATIENT
Start: 2025-07-10 | End: 2025-07-10

## 2025-07-10 RX ORDER — METOPROLOL TARTRATE 25 MG/1
25 TABLET, FILM COATED ORAL 2 TIMES DAILY
Status: DISCONTINUED | OUTPATIENT
Start: 2025-07-10 | End: 2025-07-14 | Stop reason: HOSPADM

## 2025-07-10 RX ORDER — HYDROMORPHONE HYDROCHLORIDE 1 MG/ML
0.4 INJECTION, SOLUTION INTRAMUSCULAR; INTRAVENOUS; SUBCUTANEOUS
Status: DISCONTINUED | OUTPATIENT
Start: 2025-07-10 | End: 2025-07-10

## 2025-07-10 RX ORDER — CALCIUM GLUCONATE 20 MG/ML
2 INJECTION, SOLUTION INTRAVENOUS EVERY 6 HOURS PRN
Status: DISCONTINUED | OUTPATIENT
Start: 2025-07-10 | End: 2025-07-11

## 2025-07-10 RX ORDER — OXYCODONE HYDROCHLORIDE 5 MG/1
5 TABLET ORAL EVERY 4 HOURS PRN
Refills: 0 | Status: DISCONTINUED | OUTPATIENT
Start: 2025-07-10 | End: 2025-07-10

## 2025-07-10 RX ADMIN — SODIUM CHLORIDE 1000 ML: 0.9 INJECTION, SOLUTION INTRAVENOUS at 01:33

## 2025-07-10 RX ADMIN — HYDROMORPHONE HYDROCHLORIDE 0.2 MG: 1 INJECTION, SOLUTION INTRAMUSCULAR; INTRAVENOUS; SUBCUTANEOUS at 19:18

## 2025-07-10 RX ADMIN — PREDNISOLONE ACETATE 1 DROP: 10 SUSPENSION/ DROPS OPHTHALMIC at 08:04

## 2025-07-10 RX ADMIN — ACETAMINOPHEN 650 MG: 325 TABLET ORAL at 09:46

## 2025-07-10 RX ADMIN — ACETAMINOPHEN 650 MG: 325 TABLET ORAL at 05:33

## 2025-07-10 RX ADMIN — BUPROPION HYDROCHLORIDE 150 MG: 150 TABLET, EXTENDED RELEASE ORAL at 08:02

## 2025-07-10 RX ADMIN — Medication: at 00:00

## 2025-07-10 RX ADMIN — ACETAMINOPHEN 650 MG: 325 TABLET ORAL at 15:33

## 2025-07-10 RX ADMIN — ACETAMINOPHEN 650 MG: 325 TABLET ORAL at 22:23

## 2025-07-10 RX ADMIN — OXYCODONE HYDROCHLORIDE 10 MG: 5 TABLET ORAL at 20:39

## 2025-07-10 RX ADMIN — INSULIN LISPRO 1 UNITS: 100 INJECTION, SOLUTION INTRAVENOUS; SUBCUTANEOUS at 09:01

## 2025-07-10 RX ADMIN — OXYCODONE HYDROCHLORIDE 10 MG: 5 TABLET ORAL at 16:37

## 2025-07-10 RX ADMIN — CYCLOBENZAPRINE 10 MG: 10 TABLET, FILM COATED ORAL at 06:10

## 2025-07-10 RX ADMIN — PREDNISOLONE ACETATE 1 DROP: 10 SUSPENSION/ DROPS OPHTHALMIC at 12:19

## 2025-07-10 RX ADMIN — HEPARIN SODIUM 5000 UNITS: 5000 INJECTION INTRAVENOUS; SUBCUTANEOUS at 05:33

## 2025-07-10 RX ADMIN — SODIUM CHLORIDE 500 ML: 9 INJECTION, SOLUTION INTRAVENOUS at 16:53

## 2025-07-10 RX ADMIN — METOPROLOL TARTRATE 25 MG: 25 TABLET, FILM COATED ORAL at 08:01

## 2025-07-10 RX ADMIN — PANTOPRAZOLE SODIUM 20 MG: 20 TABLET, DELAYED RELEASE ORAL at 08:02

## 2025-07-10 RX ADMIN — OXYCODONE HYDROCHLORIDE 10 MG: 5 TABLET ORAL at 05:32

## 2025-07-10 RX ADMIN — HEPARIN SODIUM 5000 UNITS: 5000 INJECTION INTRAVENOUS; SUBCUTANEOUS at 22:24

## 2025-07-10 RX ADMIN — CYCLOBENZAPRINE 10 MG: 10 TABLET, FILM COATED ORAL at 22:23

## 2025-07-10 RX ADMIN — HEPARIN SODIUM 5000 UNITS: 5000 INJECTION INTRAVENOUS; SUBCUTANEOUS at 14:09

## 2025-07-10 RX ADMIN — CYCLOBENZAPRINE 10 MG: 10 TABLET, FILM COATED ORAL at 18:23

## 2025-07-10 RX ADMIN — HYDROMORPHONE HYDROCHLORIDE 0.2 MG: 1 INJECTION, SOLUTION INTRAMUSCULAR; INTRAVENOUS; SUBCUTANEOUS at 23:22

## 2025-07-10 RX ADMIN — PREDNISOLONE ACETATE 1 DROP: 10 SUSPENSION/ DROPS OPHTHALMIC at 16:15

## 2025-07-10 RX ADMIN — PREDNISOLONE ACETATE 1 DROP: 10 SUSPENSION/ DROPS OPHTHALMIC at 20:42

## 2025-07-10 RX ADMIN — FLECAINIDE ACETATE 100 MG: 100 TABLET ORAL at 08:01

## 2025-07-10 RX ADMIN — SENNOSIDES AND DOCUSATE SODIUM 2 TABLET: 50; 8.6 TABLET ORAL at 20:40

## 2025-07-10 SDOH — SOCIAL STABILITY: SOCIAL INSECURITY: ABUSE: ADULT

## 2025-07-10 SDOH — SOCIAL STABILITY: SOCIAL INSECURITY
ASK PARENT OR GUARDIAN: ARE THERE TIMES WHEN YOU, YOUR CHILD(REN), OR ANY MEMBER OF YOUR HOUSEHOLD FEEL UNSAFE, HARMED, OR THREATENED AROUND PERSONS WITH WHOM YOU KNOW OR LIVE?: NO

## 2025-07-10 SDOH — SOCIAL STABILITY: SOCIAL INSECURITY: ARE THERE ANY APPARENT SIGNS OF INJURIES/BEHAVIORS THAT COULD BE RELATED TO ABUSE/NEGLECT?: NO

## 2025-07-10 SDOH — SOCIAL STABILITY: SOCIAL INSECURITY: ARE YOU OR HAVE YOU BEEN THREATENED OR ABUSED PHYSICALLY, EMOTIONALLY, OR SEXUALLY BY ANYONE?: NO

## 2025-07-10 SDOH — SOCIAL STABILITY: SOCIAL INSECURITY: HAVE YOU HAD ANY THOUGHTS OF HARMING ANYONE ELSE?: NO

## 2025-07-10 SDOH — ECONOMIC STABILITY: HOUSING INSECURITY: DO YOU FEEL UNSAFE GOING BACK TO THE PLACE WHERE YOU LIVE?: NO

## 2025-07-10 SDOH — SOCIAL STABILITY: SOCIAL INSECURITY: WERE YOU ABLE TO COMPLETE ALL THE BEHAVIORAL HEALTH SCREENINGS?: YES

## 2025-07-10 SDOH — SOCIAL STABILITY: SOCIAL INSECURITY: HAS ANYONE EVER THREATENED TO HURT YOUR FAMILY OR YOUR PETS?: NO

## 2025-07-10 SDOH — SOCIAL STABILITY: SOCIAL INSECURITY: DOES ANYONE TRY TO KEEP YOU FROM HAVING/CONTACTING OTHER FRIENDS OR DOING THINGS OUTSIDE YOUR HOME?: NO

## 2025-07-10 SDOH — SOCIAL STABILITY: SOCIAL INSECURITY: DO YOU FEEL UNSAFE GOING BACK TO THE PLACE WHERE YOU ARE LIVING?: NO

## 2025-07-10 SDOH — SOCIAL STABILITY: SOCIAL INSECURITY: DO YOU FEEL ANYONE HAS EXPLOITED OR TAKEN ADVANTAGE OF YOU FINANCIALLY OR OF YOUR PERSONAL PROPERTY?: NO

## 2025-07-10 SDOH — SOCIAL STABILITY: SOCIAL INSECURITY: HAVE YOU HAD THOUGHTS OF HARMING ANYONE ELSE?: NO

## 2025-07-10 ASSESSMENT — PAIN SCALES - GENERAL
PAINLEVEL_OUTOF10: 9
PAINLEVEL_OUTOF10: 3
PAINLEVEL_OUTOF10: 5 - MODERATE PAIN
PAINLEVEL_OUTOF10: 5 - MODERATE PAIN
PAINLEVEL_OUTOF10: 8
PAINLEVEL_OUTOF10: 5 - MODERATE PAIN
PAINLEVEL_OUTOF10: 7
PAINLEVEL_OUTOF10: 2
PAINLEVEL_OUTOF10: 5 - MODERATE PAIN
PAINLEVEL_OUTOF10: 5 - MODERATE PAIN
PAINLEVEL_OUTOF10: 7

## 2025-07-10 ASSESSMENT — LIFESTYLE VARIABLES
AUDIT TOTAL SCORE: 0
AUDIT-C TOTAL SCORE: 3
AUDIT-C TOTAL SCORE: 3
HOW OFTEN DURING THE LAST YEAR HAVE YOU FAILED TO DO WHAT WAS NORMALLY EXPECTED FROM YOU BECAUSE OF DRINKING: NEVER
HOW MANY STANDARD DRINKS CONTAINING ALCOHOL DO YOU HAVE ON A TYPICAL DAY: 1 OR 2
HAS A RELATIVE, FRIEND, DOCTOR, OR ANOTHER HEALTH PROFESSIONAL EXPRESSED CONCERN ABOUT YOUR DRINKING OR SUGGESTED YOU CUT DOWN: NO
AUDIT TOTAL SCORE: 3
HOW OFTEN DURING THE LAST YEAR HAVE YOU HAD A FEELING OF GUILT OR REMORSE AFTER DRINKING: NEVER
HAVE YOU OR SOMEONE ELSE BEEN INJURED AS A RESULT OF YOUR DRINKING: NO
HOW OFTEN DURING THE LAST YEAR HAVE YOU FOUND THAT YOU WERE NOT ABLE TO STOP DRINKING ONCE YOU HAD STARTED: NEVER
PRESCIPTION_ABUSE_PAST_12_MONTHS: NO
HOW OFTEN DURING THE LAST YEAR HAVE YOU BEEN UNABLE TO REMEMBER WHAT HAPPENED THE NIGHT BEFORE BECAUSE YOU HAD BEEN DRINKING: NEVER
SUBSTANCE_ABUSE_PAST_12_MONTHS: NO
HOW OFTEN DURING THE LAST YEAR HAVE YOU NEEDED AN ALCOHOLIC DRINK FIRST THING IN THE MORNING TO GET YOURSELF GOING AFTER A NIGHT OF HEAVY DRINKING: NEVER
SKIP TO QUESTIONS 9-10: 1
HOW OFTEN DO YOU HAVE 6 OR MORE DRINKS ON ONE OCCASION: NEVER
HOW OFTEN DO YOU HAVE A DRINK CONTAINING ALCOHOL: 2-3 TIMES A WEEK

## 2025-07-10 ASSESSMENT — COGNITIVE AND FUNCTIONAL STATUS - GENERAL
DAILY ACTIVITIY SCORE: 18
MOVING FROM LYING ON BACK TO SITTING ON SIDE OF FLAT BED WITH BEDRAILS: A LITTLE
DRESSING REGULAR UPPER BODY CLOTHING: A LOT
MOVING FROM LYING ON BACK TO SITTING ON SIDE OF FLAT BED WITH BEDRAILS: A LITTLE
HELP NEEDED FOR BATHING: A LITTLE
DRESSING REGULAR UPPER BODY CLOTHING: A LITTLE
MOBILITY SCORE: 17
STANDING UP FROM CHAIR USING ARMS: A LITTLE
CLIMB 3 TO 5 STEPS WITH RAILING: A LOT
DRESSING REGULAR LOWER BODY CLOTHING: A LOT
HELP NEEDED FOR BATHING: A LITTLE
TURNING FROM BACK TO SIDE WHILE IN FLAT BAD: A LITTLE
MOVING TO AND FROM BED TO CHAIR: A LITTLE
MOVING TO AND FROM BED TO CHAIR: A LITTLE
WALKING IN HOSPITAL ROOM: A LITTLE
WALKING IN HOSPITAL ROOM: A LOT
STANDING UP FROM CHAIR USING ARMS: A LITTLE
TOILETING: A LOT
DAILY ACTIVITIY SCORE: 19
CLIMB 3 TO 5 STEPS WITH RAILING: A LOT
TURNING FROM BACK TO SIDE WHILE IN FLAT BAD: A LITTLE
MOBILITY SCORE: 16
TOILETING: A LITTLE
DRESSING REGULAR LOWER BODY CLOTHING: A LITTLE
PATIENT BASELINE BEDBOUND: NO

## 2025-07-10 ASSESSMENT — ACTIVITIES OF DAILY LIVING (ADL)
HEARING - RIGHT EAR: FUNCTIONAL
ADLS_ADDRESSED: NO
DRESSING YOURSELF: INDEPENDENT
PATIENT'S MEMORY ADEQUATE TO SAFELY COMPLETE DAILY ACTIVITIES?: YES
LACK_OF_TRANSPORTATION: NO
ADL_ASSISTANCE: INDEPENDENT
BATHING: INDEPENDENT
HEARING - LEFT EAR: FUNCTIONAL
WALKS IN HOME: INDEPENDENT
JUDGMENT_ADEQUATE_SAFELY_COMPLETE_DAILY_ACTIVITIES: YES
FEEDING YOURSELF: INDEPENDENT
ADEQUATE_TO_COMPLETE_ADL: YES
GROOMING: INDEPENDENT
LACK_OF_TRANSPORTATION: NO
ADL_ASSISTANCE: INDEPENDENT
BATHING_ASSISTANCE: MINIMAL
TOILETING: INDEPENDENT

## 2025-07-10 ASSESSMENT — PAIN - FUNCTIONAL ASSESSMENT
PAIN_FUNCTIONAL_ASSESSMENT: 0-10

## 2025-07-10 ASSESSMENT — PATIENT HEALTH QUESTIONNAIRE - PHQ9
SUM OF ALL RESPONSES TO PHQ9 QUESTIONS 1 & 2: 0
2. FEELING DOWN, DEPRESSED OR HOPELESS: NOT AT ALL
1. LITTLE INTEREST OR PLEASURE IN DOING THINGS: NOT AT ALL

## 2025-07-10 ASSESSMENT — PAIN DESCRIPTION - DESCRIPTORS
DESCRIPTORS: ACHING;THROBBING
DESCRIPTORS: ACHING;THROBBING

## 2025-07-10 ASSESSMENT — PAIN DESCRIPTION - LOCATION: LOCATION: HEAD

## 2025-07-10 NOTE — CARE PLAN
Problem: Pain - Adult  Goal: Verbalizes/displays adequate comfort level or baseline comfort level  7/10/2025 1010 by Komal Cuenca RN  Outcome: Progressing  7/10/2025 1009 by Komal Cuenca RN  Outcome: Progressing     Problem: Safety - Adult  Goal: Free from fall injury  7/10/2025 1010 by Komal Cuenca RN  Outcome: Progressing  7/10/2025 1009 by Komal Cuenca RN  Outcome: Progressing     Problem: Chronic Conditions and Co-morbidities  Goal: Patient's chronic conditions and co-morbidity symptoms are monitored and maintained or improved  7/10/2025 1010 by Komal Cuenca RN  Outcome: Progressing  7/10/2025 1009 by Komal Cuenca RN  Outcome: Progressing     Problem: Nutrition  Goal: Nutrient intake appropriate for maintaining nutritional needs  7/10/2025 1010 by Komal Cuenca RN  Outcome: Progressing  7/10/2025 1009 by Komal Cuenca RN  Outcome: Progressing     Problem: Skin  Goal: Decreased wound size/increased tissue granulation at next dressing change  Outcome: Progressing  Goal: Participates in plan/prevention/treatment measures  7/10/2025 1010 by Komal Cuenca RN  Outcome: Progressing  7/10/2025 1009 by Komal Cuenca RN  Outcome: Progressing  Goal: Prevent/manage excess moisture  Outcome: Progressing  Goal: Prevent/minimize sheer/friction injuries  Outcome: Progressing  Goal: Promote skin healing  Outcome: Progressing     Problem: Fall/Injury  Goal: Not fall by end of shift  Outcome: Progressing  Goal: Verbalize understanding of personal risk factors for fall in the hospital  Outcome: Progressing  Goal: Verbalize understanding of risk factor reduction measures to prevent injury from fall in the home  Outcome: Progressing     Problem: Pain  Goal: Takes deep breaths with improved pain control throughout the shift  Outcome: Progressing  Goal: Turns in bed with improved pain control throughout the shift  Outcome: Progressing  Goal: Performs ADL's with improved pain control throughout shift  Outcome: Progressing

## 2025-07-10 NOTE — PROGRESS NOTES
[facility]  NEUROSCIENCE INTENSIVE CARE UNIT  DAILY PROGRESS NOTE       Patient Name: Kvng June   MRN: [id number]     Admit Date: 2025     : 1965 AGE: 60 y.o. GENDER: male        Subjective      Significant Events:  - This is a 61 yo M with PMHX includes Anxiety, Depression, ADD, HTN, Paroxsymal Atrial Fibrillation, LEATHA (CPAP), Erectile Dysfunction, MORSE, GERD, diverticulosis, Degenerative scoliosis in adult patient, Pseudoarthrosis of lumbar spine, Epidural lipomatosis, Lumbar radiculopathy, Degenerative lumbar spinal stenosis, chornic pain disorder. He is scheduled for T10-pelvis revision/extension fusion with L1-2, L2-3, and L5-S1 laminectomies; L2-3, L4-5, and L5-S1 transforaminal lumbar interbody fusion and bilateral sacroiliac joint fusion  on 2025. He comes to NSICU for further management.     Interval Events: Post op T10-pelvis revision/extension fusion with L1-2, L2-3, and L5-S1 laminectomies; L2-3, L4-5, and L5-S1 transforaminal lumbar interbody fusion and bilateral sacroiliac joint fusion      Objective   VITALS (24H):  Temp:  [36.3 °C (97.3 °F)-36.6 °C (97.9 °F)] 36.3 °C (97.3 °F)  Heart Rate:  [71-96] 86  Resp:  [10-18] 12  BP: ()/(43-81) 113/72  Arterial Line BP 1: ()/(37-62) 97/48  INTAKE/OUTPUT:  Intake/Output Summary (Last 24 hours) at 7/10/2025 0219  Last data filed at 2025 2100  Gross per 24 hour   Intake 5717.82 ml   Output 3655 ml   Net 2062.82 ml     VENT SETTINGS:        PHYSICAL EXAM:  NEURO:  - Alert, oriented x4, follows commands  - EOS, PERRL, EOMI, VFF  - ISAAC 5/5 strength, no drift, no ataxia  CV:  - RRR on telemetry, NSR  - Arterial line in place, waveform: good  RESP:  - No signs of resp distress  - Oxygen: 2L    :  - artis catheter in place  GI:  - Abdomen NT/ND, soft  SKIN:  - Surgical site Intact    MEDICATIONS:  Scheduled: PRN: Continuous:   Scheduled Medications[1] PRN Medications[2] Continuous Medications[3]     LAB  RESULTS:  Results from last 72 hours   Lab Units 07/09/25 2011   GLUCOSE mg/dL 177*   SODIUM mmol/L 133*   POTASSIUM mmol/L 4.0   CHLORIDE mmol/L 100   CO2 mmol/L 21   ANION GAP mmol/L 16   BUN mg/dL 24*   CREATININE mg/dL 1.33*   EGFR mL/min/1.73m*2 61   CALCIUM mg/dL 7.8*   ALBUMIN g/dL 3.7      Results from last 72 hours   Lab Units 07/09/25 2011   WBC AUTO x10*3/uL 19.7*   NRBC AUTO /100 WBCs 0.0   RBC AUTO x10*6/uL 2.59*   HEMOGLOBIN g/dL 8.8*   HEMATOCRIT % 25.0*   MCV fL 97   MCH pg 34.0   MCHC g/dL 35.2   RDW % 15.6*   PLATELETS AUTO x10*3/uL 119*             IMAGING RESULTS:  FL fluoro images no charge   Final Result             Assessment/Plan    This is a 61 yo M with PMHX includes Anxiety, Depression, ADD, HTN, Paroxsymal Atrial Fibrillation, LEATHA (CPAP), Erectile Dysfunction, MORSE, GERD, diverticulosis, Degenerative scoliosis in adult patient, Pseudoarthrosis of lumbar spine, Epidural lipomatosis, Lumbar radiculopathy, Degenerative lumbar spinal stenosis, chornic pain disorder. He is scheduled for T10-pelvis revision/extension fusion with L1-2, L2-3, and L5-S1 laminectomies; L2-3, L4-5, and L5-S1 transforaminal lumbar interbody fusion and bilateral sacroiliac joint fusion  on 07/09/2025. He comes to NSICU for further management.     NEURO:  #T10-pelvis revision/extension fusion with L1-2, L2-3, and L5-S1 laminectomies; L2-3, L4-5, and L5-S1 transforaminal lumbar interbody fusion and bilateral sacroiliac joint fusion   Assessment:  - see aobve  Plan:  - NSU  - Neuro Checks: Q1H  - Sedation: none  - Pain: hydromorphone pca  - Nausea: ondansetron  - PT/OT/SLP  - Hemovac to full suction  - SBP<200  - MAPs>65  - Keep Hgb 8    CARDIOVASCULAR:  #HTN  # pAFIB  Assessment:  - holding home BP  - ECHO: EF = 63 ± 5% (2D biplane)       Plan:  - Continue to monitor on telemetry  - BP goal: SBP<200 and MAPs>65      RESPIRATORY:  #LEATHA  Assessment:    Plan:  - Continuous pulse oximetry   - O2 PRN to maintain SpO2 >  94%, wean as tolerated  - Incentive spirometry while awake and CPAP    RENAL/:  #No acute isses  Assessment:    Results from last 72 hours   Lab Units 25   BUN mg/dL 24*   CREATININE mg/dL 1.33*       Plan:  - Monitor with daily RFP  - Maintain artis catheter for: critically ill patient who need accurate urinary output measurements    FEN/GI:  #MORSE, GERD, diverticulosis    Plan:  - Monitor and replace electrolytes per protocol  - IVF: NS @ 100 mL/hr  - Diet: regular   - Bowel Regimen: Docusate-Senna QD and Miralax QD    ENDOCRINE:  #No acute issues  Assessment:  Results from last 7 days   Lab Units 25  2159 25   POCT GLUCOSE mg/dL 156*  --    GLUCOSE mg/dL  --  177*   SODIUM mmol/L  --  133*        Plan:  - Accuchecks & ISS PRN     HEMATOLOGY:  #No acute isses  Assessment:    Results from last 7 days   Lab Units 25   HEMOGLOBIN g/dL 8.8*   HEMATOCRIT % 25.0*   PLATELETS AUTO x10*3/uL 119*     Plan:  - Continue to monitor with daily CBC and Coag panel    INFECTIOUS DISEASE:  #Leucocytosis likely due to surgical intervention  Assessment:  Results from last 7 days   Lab Units 25   WBC AUTO x10*3/uL 19.7*    - Temp (24hrs), Av.4 °C (97.5 °F), Min:36.3 °C (97.3 °F), Max:36.6 °C (97.9 °F)     Plan:  - Continue to monitor for s/sx of infection  - Pan culture for temperature > 38.4 C    MUSCULOSKELETAL:  - No acute issues    SKIN:  - No acute issues  - Turns and skin care per NSU protocol    ACCESS:  - PIV    PROPHYLAXIS:  - DVT Ppx: SCDs  - GI Ppx: Pantoprazole    RESTRAINTS:  Not indicated/Patient does not meet criteria for restraints    JUAN Olivo-CNP  Neuroscience Intensive Care       Total critical care time of 50 minutes, with > 50% of time spent in direct contact with patient/family for education, counseling and coordination of care.        [1] acetaminophen, 650 mg, oral, q6h  heparin (porcine), 5,000 Units, subcutaneous, q8h  HYDROmorphone, 0.2 mg,  intravenous, Once  polyethylene glycol, 17 g, oral, Daily  sennosides-docusate sodium, 2 tablet, oral, BID  sodium chloride, 1,000 mL, intravenous, Once  [2] PRN medications: cyclobenzaprine, dextrose, dextrose, glucagon, glucagon, naloxone, [Transfer Hold] naloxone, ondansetron **OR** ondansetron, oxyCODONE, oxyCODONE, oxyCODONE, oxygen, promethazine **OR** promethazine  [3] HYDROmorphone,   norepinephrine, 0.01-1 mcg/kg/min, Last Rate: 0.04 mcg/kg/min (07/10/25 0134)  sodium chloride 0.9%, 100 mL/hr, Last Rate: 100 mL/hr (07/09/25 7777)

## 2025-07-10 NOTE — PROGRESS NOTES
Physical Therapy    Physical Therapy Evaluation    Patient Name: Kvng June  MRN: [id number]  Today's Date: 7/10/2025   Room: 10/10  Time Calculation  Start Time: 1052  Stop Time: 1116  Time Calculation (min): 24 min    Assessment/Plan   PT Assessment  PT Assessment Results: Decreased strength, Decreased endurance, Impaired balance, Decreased mobility, Orthopedic restrictions, Pain  Rehab Prognosis: Excellent  Barriers to Discharge Home: No anticipated barriers  Evaluation/Treatment Tolerance: Patient tolerated treatment well  End of Session Communication: Bedside nurse  Assessment Comment: Patient to benefit from ongoing PT services to address the above limitations and to facilitate timely return to prior level of function.  End of Session Patient Position: Up in chair, Alarm on  IP OR SWING BED PT PLAN  Inpatient or Swing Bed: Inpatient  PT Plan  Treatment/Interventions: Bed mobility, Transfer training, Gait training, Stair training, Balance training, Neuromuscular re-education, Strengthening, Endurance training, Therapeutic exercise, Therapeutic activity, Home exercise program, Postural re-education  PT Plan: Ongoing PT  PT Frequency: Daily  PT Discharge Recommendations: Low intensity level of continued care  Equipment Recommended upon Discharge: Other (comment) (No device needs)  PT Recommended Transfer Status: Assist x1, Assistive device  PT - OK to Discharge: Yes      Subjective   General Visit Information:  Reason for Referral: Pt p/w recurrent back pain and radiculopathy, 7/9 s/p revision/extension T10-pelvis fusion, multilevel decompression and TLIFs (EBL: 2.1L)  Past Medical History Relevant to Rehab: HTN, Afib (not on AC), LEATHA, OA, BPH, depression, prior L3-5 fusion ([facility] 10/2022)  Prior to Session Communication: Bedside nurse  Patient Position Received: Bed, 3 rail up, Alarm off, not on at start of session  Family/Caregiver Present: No  General Comment: Rehab aide assisted with room setup      Home Living:  Home Living  Type of Home: House  Lives With: Spouse  Home Adaptive Equipment: Walker rolling or standard, Scooter (Scooter for very long community distances, otherwise uses rollator. Owns front wheeled walker.)  Home Layout: One level  Home Access: Stairs to enter with rails  Entrance Stairs-Rails: Right  Entrance Stairs-Number of Steps: 3  Bathroom Shower/Tub: Tub/shower unit    Prior Level of Function:  Prior Function Per Pt/Caregiver Report  Level of [place]: Independent with ADLs and functional transfers, Independent with homemaking with ambulation  ADL Assistance: Independent  Homemaking Assistance: Independent  Ambulatory Assistance: Independent  Vocational:  ( at [employer]; Not presently working due to back pain but plans to go back to work at some point)  Hand Dominance: Right  Prior Function Comments: Drives, reports history of frequent falls due to BLE giving out    Precautions:  Precautions  Hearing/Visual Limitations: WFL  Medical Precautions: Fall precautions  Post-Surgical Precautions: Spinal precautions  Precautions Comment: SBP <160, MAP >65    Vital Signs:   Date/Time Vitals Session Patient Position Pulse Resp SpO2 BP MAP (mmHg)    07/10/25 1052 Pre PT  Lying  84  13  94 %  134/85  98     07/10/25 1100 --  --  81  18  98 %  128/78  92     07/10/25 1102 During PT  Sitting  85  11  98 %  115/80  84     07/10/25 1116 Post PT  --  83  --  95 %  --  --     07/10/25 1200 --  --  79  20  97 %  --  --             Objective   Lines/Tubes/Drains:  Arterial Line 07/09/25 Right Radial (Active)   Number of days: 1       Urethral Catheter Non-latex 16 Fr. (Active)   Number of days: 1       Closed/Suction Drain 1 Midline Back (Active)   Number of days: 0       Closed/Suction Drain 2 Left;Midline Back (Active)   Number of days: 0       Continuous Medications/Drips:  HYDROmorphone,   norepinephrine, 0.01-1 mcg/kg/min, Last Rate: 0.04 mcg/kg/min (07/10/25 1131)  sodium  chloride 0.9%, 100 mL/hr, Last Rate: 100 mL/hr (07/10/25 0800)      Oxygen: room air     Pain:  Pain Assessment  Pain Assessment: 0-10  0-10 (Numeric) Pain Score: 2 (Up to 8/10 during mobility, 3/10 post)  Pain Type: Acute pain, Surgical pain  Pain Location: Back  Pain Interventions: Repositioned  Response to Interventions: Decrease in pain    Cognition:  Cognition  Overall Cognitive Status: Within Functional Limits  Orientation Level: Oriented X4  Insight: Within function limits  Impulsive: Within functional limits  Processing Speed: Within funtional limits      Extremity/Trunk Assessments:  Strength:       RUE   RUE : Within Functional Limits    LUE   LUE: Within Functional Limits    RLE   RLE : Exceptions to WFL  Strength RLE  R Knee Flexion: 4+/5  R Knee Extension: 4+/5  R Ankle Dorsiflexion: 4+/5  R Ankle Plantar Flexion: 4+/5    LLE   LLE : Exceptions to WFL  Strength LLE  L Knee Flexion: 4+/5  L Knee Extension: 4+/5  L Ankle Dorsiflexion: 3+/5  L Ankle Plantar Flexion: 4+/5    General Assessments:         Activity Tolerance  Endurance: Tolerates 10 - 20 min exercise with multiple rests  Early Mobility/Exercise Safety Screen: PEEP greater than 12cm H2O  Sensation  Light Touch:  (Pt reports chronic LLE medial numbness distal to knee. Reporitng RUE numbness in ulnar N. distribution since surgery.)  Coordination  Movements are Fluid and Coordinated: Yes  Static Sitting Balance  Static Sitting-Balance Support: Bilateral upper extremity supported, Feet supported  Static Sitting-Level of Assistance: Close supervision  Dynamic Sitting Balance  Dynamic Sitting-Balance Support: Bilateral upper extremity supported, Feet supported  Dynamic Sitting-Level of Assistance: Close supervision  Dynamic Sitting-Balance: Forward lean  Static Standing Balance  Static Standing-Balance Support: Bilateral upper extremity supported  Static Standing-Level of Assistance: Contact guard  Dynamic Standing Balance  Dynamic Standing-Balance  Support: Bilateral upper extremity supported  Dynamic Standing-Level of Assistance: Contact guard  Dynamic Standing-Balance: Turning    Functional Assessments:  Bed Mobility  Bed Mobility: Yes  Bed Mobility 1  Bed Mobility 1: Supine to sitting, Log roll  Level of Assistance 1: Minimum assistance  Bed Mobility Comments 1: Cues for proper hand placement, task sequencing    Transfers  Transfer: Yes  Transfer 1  Transfer From 1: Sit to  Transfer to 1: Stand  Technique 1: Stand to sit, Sit to stand  Transfer Device 1: Walker  Transfer Level of Assistance 1: Contact guard  Trials/Comments 1: Cues for proper hand placement and controlling speed.    Ambulation/Gait Training  Ambulation/Gait Training Performed: Yes  Ambulation/Gait Training 1  Surface 1: Level tile  Device 1: Rolling walker  Assistance 1: Contact guard  Quality of Gait 1: Narrow base of support, Diminished heel strike, Decreased step length, Antalgic  Comments/Distance (ft) 1: 3 steps laterally to R    Stairs  Stairs: No         Outcome Measures:  [facility] Basic Mobility  Turning from your back to your side while in a flat bed without using bedrails: A little  Moving from lying on your back to sitting on the side of a flat bed without using bedrails: A little  Moving to and from bed to chair (including a wheelchair): A little  Standing up from a chair using your arms (e.g. wheelchair or bedside chair): A little  To walk in hospital room: A little  Climbing 3-5 steps with railing: A lot  Basic Mobility - Total Score: 17                   FSS-ICU  Ambulation: Walks <50 feet with any assistance x1 or walks any distance with assistance x2 people  Rolling: Supervision or set-up only  Sitting: Supervision or set-up only  Transfer Sit-to-Stand: Supervision or set-up only  Transfer Supine-to-Sit: Supervision or set-up only  Total Score: 21    ICU Mobility Screen  Early Mobility/Exercise Safety Screen: PEEP greater than 12cm H2O  ICU Mobility Scale: Transferring bed to  chair                        Encounter Problems       Encounter Problems (Active)       PT Problem       Patient will perform bed mobility IND to reduce risk of developing decubitus ulcers.  (Progressing)       Start:  07/10/25    Expected End:  07/24/25            Patient will perform sit to stand and stand to sit transfers MOD-I with LRD to increase functional strength.   (Progressing)       Start:  07/10/25    Expected End:  07/24/25            Patient will ambulate at least 250  ft. MOD-I with LRD to improve tolerance of community distances.     (Progressing)       Start:  07/10/25    Expected End:  07/24/25            Patient will ascend and descend >/= 3 steps with railing and </= close sup to facilitate safe navigation of stairs in the home.    (Progressing)       Start:  07/10/25    Expected End:  07/24/25            Patient will score >/= 24/28 points on the [employer] Performance Oriented Mobility Assessment to indicate low falls risk. (Progressing)       Start:  07/10/25    Expected End:  07/24/25                   Education Documentation  Precautions, taught by Sydney Mcgarry PT at 7/10/2025 12:03 PM.  Learner: Patient  Readiness: Acceptance  Method: Explanation  Response: Verbalizes Understanding  Comment: PT expectations, mobility precautions, d/c recs    Body Mechanics, taught by Sydney Mcgarry PT at 7/10/2025 12:03 PM.  Learner: Patient  Readiness: Acceptance  Method: Explanation  Response: Verbalizes Understanding  Comment: PT expectations, mobility precautions, d/c recs    Mobility Training, taught by Sydney Mcgarry PT at 7/10/2025 12:03 PM.  Learner: Patient  Readiness: Acceptance  Method: Explanation  Response: Verbalizes Understanding  Comment: PT expectations, mobility precautions, d/c recs    Education Comments  No comments found.            07/10/25 at 12:04 PM   Sydney Mcgarry PT   Rehab Office: [phone]

## 2025-07-10 NOTE — HOSPITAL COURSE
Kvng June is a 60 y.o. male with a past medical history of HTN, Afib (not on AC), LEATHA, OA, BPH, depression and prior L3-5 fusion ([facility] 10/2022) who presented with recurrent back pain and radiculopathy. Patient taken to the OR on 7/9 for revision/extension T10-pelvis fusion, multilevel decompression and TLIFs (EBL: 2.1L). He was admitted to the neurosurgery service post operatively.     7/10 Concern for LLE weakness, CT L spine with hardware in position.   7/11 Hbg 7.7 s/p 1 unit PRBC (post transfusion Hbg 8.6.     PT/OT evaluated patient and recommended low level of continued therapy for which patient provided with outpatient prescriptions.      On the day of discharge, the patient was seen and evaluated by the neurosurgery team and deemed suitable for discharge. The patient was given detailed discharge instructions and were scheduled to follow up as an outpatient.

## 2025-07-10 NOTE — PROGRESS NOTES
[facility]  NEUROSCIENCE INTENSIVE CARE UNIT  DAILY PROGRESS NOTE       Patient Name: Kvng June   MRN: [id number]     Admit Date: 2025     : 1965 AGE: 60 y.o. GENDER: male        Subjective    61 yo M with PMHX Anxiety, Depression, ADD, HTN, Paroxsymal Atrial Fibrillation, LEATHA (CPAP), Erectile Dysfunction, MORSE, GERD, diverticulosis, Degenerative scoliosis in adult patient, Pseudoarthrosis of lumbar spine, Epidural lipomatosis, Lumbar radiculopathy, Degenerative lumbar spinal stenosis, chornic pain disorder. He is scheduled for T10-pelvis revision/extension fusion with L1-2, L2-3, and L5-S1 laminectomies; L2-3, L4-5, and L5-S1 transforaminal lumbar interbody fusion and bilateral sacroiliac joint fusion on 2025. He comes to NSU for post-operative management.     Significant Events: s/p post op T10-pelvis revision/extension fusion with L1-2, L2-3, and L5-S1 laminectomies; L2-3, L4-5, and L5-S1 transforaminal lumbar interbody fusion and bilateral sacroiliac joint fusion. EBL ~2520 ml. Received 2u prbcs, 3L LR, 250 ml NS intra-op    Interval Events: Admitted to NSU overnight. Remains on norepinephrine gtt       Objective   VITALS (24H):  Temp:  [36.3 °C (97.3 °F)-36.6 °C (97.9 °F)] 36.3 °C (97.3 °F)  Heart Rate:  [71-96] 82  Resp:  [8-23] 12  BP: ()/(43-81) 123/71  Arterial Line BP 1: ()/(37-62) 93/53  INTAKE/OUTPUT:  Intake/Output Summary (Last 24 hours) at 7/10/2025 0716  Last data filed at 7/10/2025 0600  Gross per 24 hour   Intake 8008.64 ml   Output 4785 ml   Net 3223.64 ml     VENT SETTINGS:        PHYSICAL EXAM:  NEURO:  - Alert, oriented x4, follows commands  - EOS, PERRL, EOMI, VFF  - ISAAC spont/AG 5/5 strength, no drift, no ataxia  - SILT  - Numbness endorses on R foot  CV:  - RRR on telemetry, NSR  - Arterial line in place, waveform: good  RESP:  - No signs of resp distress  - Oxygen: RA  :  - artis catheter in place  GI:  - Abdomen NT/ND, soft  SKIN:  - 2  drains    MEDICATIONS:  Scheduled: PRN: Continuous:   Scheduled Medications[1] PRN Medications[2] Continuous Medications[3]     LAB RESULTS:  Results from last 72 hours   Lab Units 07/10/25  0508 07/09/25 2011   GLUCOSE mg/dL 168* 177*   SODIUM mmol/L 134* 133*   POTASSIUM mmol/L 3.8 4.0   CHLORIDE mmol/L 101 100   CO2 mmol/L 26 21   ANION GAP mmol/L 11 16   BUN mg/dL 26* 24*   CREATININE mg/dL 1.36* 1.33*   EGFR mL/min/1.73m*2 59* 61   CALCIUM mg/dL 7.7* 7.8*   ALBUMIN g/dL  --  3.7      Results from last 72 hours   Lab Units 07/10/25  0508 07/09/25 2011   WBC AUTO x10*3/uL 19.0* 19.7*   NRBC AUTO /100 WBCs 0.0 0.0   RBC AUTO x10*6/uL 2.11* 2.59*   HEMOGLOBIN g/dL 6.9* 8.8*   HEMATOCRIT % 21.2* 25.0*   MCV fL 101* 97   MCH pg 32.7 34.0   MCHC g/dL 32.5 35.2   RDW % 17.5* 15.6*   PLATELETS AUTO x10*3/uL 103* 119*             IMAGING RESULTS:  FL fluoro images no charge   Final Result      FL fluoro images no charge   Final Result           Assessment/Plan    This is a 59 yo M with PMHX includes Anxiety, Depression, ADD, HTN, Paroxsymal Atrial Fibrillation, LEATHA (CPAP), Erectile Dysfunction, MORSE, GERD, diverticulosis, Degenerative scoliosis in adult patient, Pseudoarthrosis of lumbar spine, Epidural lipomatosis, Lumbar radiculopathy, Degenerative lumbar spinal stenosis, chornic pain disorder. He is scheduled for T10-pelvis revision/extension fusion with L1-2, L2-3, and L5-S1 laminectomies; L2-3, L4-5, and L5-S1 transforaminal lumbar interbody fusion and bilateral sacroiliac joint fusion  on 07/09/2025. He comes to NSICU for further management.     NEURO:  #T10-pelvis revision/extension fusion with L1-2, L2-3, and L5-S1 laminectomies; L2-3, L4-5, and L5-S1 transforaminal lumbar interbody fusion and bilateral sacroiliac joint fusion   Assessment:  - Neurologically: see exam above  Plan:  - NSU  - Neuro Checks: Q1H  - Pain: hydromorphone pca  - Nausea: ondansetron  - Drain management per NSGY  - MAPs>65  -  PT/OT    CARDIOVASCULAR:  #HTN  # pAFIB  Assessment:  - Norepinephrine gtt @ 0.05  - ECHO: None on file  Plan:  - Continue to monitor on telemetry  - BP goal: MAPs>65  - Wean norepinephrine gtt as able  - Continue to hold home anti-HTN meds    RESPIRATORY:  #LEATHA  Assessment:  - On RA  Plan:  - Continuous pulse oximetry   - O2 PRN to maintain SpO2 > 94%, wean as tolerated  - Incentive spirometry while awake and CPAP    RENAL/:  #ROBERT  Assessment:    Results from last 72 hours   Lab Units 07/10/25  0508 25   BUN mg/dL 26* 24*   CREATININE mg/dL 1.36* 1.33*       Plan:  - Monitor with daily RFP  - Maintain artis catheter for: critically ill patient who need accurate urinary output measurements    FEN/GI:  #MORSE, GERD, diverticulosis  Assessment:  - Last BM: PTA  Plan:  - Monitor and replace electrolytes per protocol  - IVF: NS @ 100 mL/hr  - Diet: regular   - Bowel Regimen: Docusate-Senna BID and Miralax QD    ENDOCRINE:  #Hyperglycemia  Assessment:  Results from last 7 days   Lab Units 07/10/25  05025   POCT GLUCOSE mg/dL  --  156*  --    GLUCOSE mg/dL 168*  --  177*   SODIUM mmol/L 134*  --  133*      Plan:  - Accuchecks & ISS AC&HS    HEMATOLOGY:  #Acute Blood Loss Anemia  Assessment:    Results from last 7 days   Lab Units 07/10/25  0508 25   HEMOGLOBIN g/dL 6.9* 8.8*   HEMATOCRIT % 21.2* 25.0*   PLATELETS AUTO x10*3/uL 103* 119*     Plan:  - Prepare 2u prbc, transfuse 1u  - Obtain CBC s/p transfusion  - Continue to monitor with q6h CBC and Coag panel  - Maintain hgb >8, plt>100, fibrinogen >150, INR <1.6    INFECTIOUS DISEASE:  #Leucocytosis likely 2/2 surgical intervention  Assessment:  Results from last 7 days   Lab Units 07/10/25  0508 25   WBC AUTO x10*3/uL 19.0* 19.7*    - Temp (24hrs), Av.4 °C (97.5 °F), Min:36.3 °C (97.3 °F), Max:36.6 °C (97.9 °F)     Plan:  - Continue to monitor for s/sx of infection  - Pan culture for temperature > 38.4  C    MUSCULOSKELETAL:  - No acute issues    SKIN:  - No acute issues  - Turns and skin care per NSU protocol    ACCESS:  - PIV    PROPHYLAXIS:  - DVT Ppx: SCDs  - GI Ppx: Pantoprazole    RESTRAINTS:  Not indicated/Patient does not meet criteria for restraints    Emir Cooley, JUAN-CNP  Neuroscience Intensive Care       Total critical care time of 50 minutes, with > 50% of time spent in direct contact with patient/family for education, counseling and coordination of care.          [1] acetaminophen, 650 mg, oral, q6h  buPROPion XL, 150 mg, oral, Daily  flecainide, 100 mg, oral, BID  heparin (porcine), 5,000 Units, subcutaneous, q8h  metoprolol tartrate, 25 mg, oral, BID  pantoprazole, 20 mg, oral, Daily before breakfast  polyethylene glycol, 17 g, oral, Daily  prednisoLONE acetate, 1 drop, Left Eye, 4x daily  sennosides-docusate sodium, 2 tablet, oral, BID     [2] PRN medications: cyclobenzaprine, dextrose, dextrose, glucagon, glucagon, naloxone, [Transfer Hold] naloxone, ondansetron **OR** ondansetron, oxyCODONE, oxyCODONE, oxyCODONE, oxygen, promethazine **OR** promethazine  [3] HYDROmorphone,   norepinephrine, 0.01-1 mcg/kg/min, Last Rate: 0.05 mcg/kg/min (07/10/25 0400)  sodium chloride 0.9%, 100 mL/hr, Last Rate: 100 mL/hr (07/10/25 0400)

## 2025-07-10 NOTE — PROGRESS NOTES
"Kvng June is a 60 y.o. male on day 1 of admission presenting with Degenerative scoliosis in adult patient.    Subjective   No acute events overnight       Objective     Physical Exam  Constitutional: No acute distress  Resp: breathing comfortably on room air  Neuro: Awake, Ox3  face symmetric  RUE 5/5  LUE 5/5  RLE 5/5  LLE 5/5  sensation intact to light touch  Psych: appropriate  Skin: prevena over incision  Drains x2 in place      Last Recorded Vitals  Blood pressure (!) 95/47, pulse 82, temperature 35.7 °C (96.3 °F), temperature source Temporal, resp. rate 21, height 1.753 m (5' 9\"), weight 93.8 kg (206 lb 12.7 oz), SpO2 97%.  Intake/Output last 3 Shifts:  I/O last 3 completed shifts:  In: 8008.6 (85.4 mL/kg) [P.O.:250; I.V.:1546.1 (16.5 mL/kg); Blood:1050; IV Piggyback:5162.5]  Out: 4785 (51 mL/kg) [Urine:1610 (0.5 mL/kg/hr); Drains:655; Blood:2520]  Weight: 93.8 kg     Relevant Results                              Assessment & Plan  Degenerative scoliosis in adult patient    Epidural lipomatosis    Lumbar radiculopathy    Chronic renal impairment, stage 3a (Multi)    Lumbar disc disease    Osteoarthritis    Pseudoarthrosis of lumbar spine    Degenerative lumbar spinal stenosis    Kvng June is a 60 year old male w/o HTN, Afib (not on AC), LEATHA, OA, BPH, depression, prior L3-5 fusion ([facility] 10/[facility]22), p/w recurrent back pain and radiculopathy, 7/9 s/p revision/extension T10-pelvis fusion, multilevel decompression and TLIFs (EBL: 2.1L)    Plan  NSU  Keep drains/Prevena  SBP <160  Q8 labs  Maintain PCA/[place]  PTOT          Manny Trevino MD    "

## 2025-07-10 NOTE — PROGRESS NOTES
Occupational Therapy    Evaluation    Patient Name: Kvng June  MRN: [id number]  Department: [facility] [place] [place] NS  Room: 10/10-A  Today's Date: 7/10/2025  Time Calculation  Start Time: 1308  Stop Time: 1330  Time Calculation (min): 22 min        Assessment:  OT Assessment: Pt presents with deficits in high level balance and activity/upright tolerance, new post op spine precautions impeding occupational performance.  Prognosis: Good  Barriers to Discharge Home: No anticipated barriers  Evaluation/Treatment Tolerance: Treatment limited secondary to medical complications (Comment)  Medical Staff Made Aware: Yes  End of Session Communication: Bedside nurse  End of Session Patient Position: Up in chair, Alarm off, not on at start of session  OT Assessment Results: Decreased ADL status, Decreased endurance, Decreased functional mobility, Decreased IADLs  Prognosis: Good  Barriers to Discharge: None  Evaluation/Treatment Tolerance: Treatment limited secondary to medical complications (Comment)  Medical Staff Made Aware: Yes  Strengths: Ability to acquire knowledge, Premorbid level of function  Barriers to Participation: Comorbidities  Plan:  Treatment Interventions: ADL retraining, Functional transfer training, UE strengthening/ROM, Endurance training, Patient/family training, Equipment evaluation/education, Compensatory technique education  OT Frequency: 3 times per week  OT Discharge Recommendations: Low intensity level of continued care  Equipment Recommended upon Discharge:  (None)  OT Recommended Transfer Status: Assist of 1  OT - OK to Discharge: Yes  Treatment Interventions: ADL retraining, Functional transfer training, UE strengthening/ROM, Endurance training, Patient/family training, Equipment evaluation/education, Compensatory technique education    Subjective   Current Problem:  1. Degenerative scoliosis in adult patient  Insert and maintain peripheral IV    Saline lock IV    Type And Screen    povidone-iodine 5 % kit  kit    Full code    Admit to inpatient    Insert and maintain peripheral IV    Saline lock IV    Full code    Admit to inpatient    Inpatient consult to Respiratory Care    Inpatient consult to Respiratory Care    CANCELED: NPO Diet Except: Sips with meds; Effective now    CANCELED: Height and weight    CANCELED: Apply sequential compression device    CANCELED: Apply ARVIND hose    CANCELED: NPO Diet Except: Sips with meds; Effective now    CANCELED: Height and weight    CANCELED: Apply sequential compression device    CANCELED: Apply ARVIND hose      2. Pseudoarthrosis of lumbar spine  Insert and maintain peripheral IV    Saline lock IV    Type And Screen    povidone-iodine 5 % kit kit    Full code    Admit to inpatient    Insert and maintain peripheral IV    Saline lock IV    Full code    Admit to inpatient    Inpatient consult to Respiratory Care    Inpatient consult to Respiratory Care    CANCELED: NPO Diet Except: Sips with meds; Effective now    CANCELED: Height and weight    CANCELED: Apply sequential compression device    CANCELED: Apply ARVIND hose    CANCELED: NPO Diet Except: Sips with meds; Effective now    CANCELED: Height and weight    CANCELED: Apply sequential compression device    CANCELED: Apply ARVIND hose      3. Epidural lipomatosis  Insert and maintain peripheral IV    Saline lock IV    Type And Screen    povidone-iodine 5 % kit kit    Full code    Admit to inpatient    Insert and maintain peripheral IV    Saline lock IV    Full code    Admit to inpatient    Inpatient consult to Respiratory Care    Inpatient consult to Respiratory Care    CANCELED: NPO Diet Except: Sips with meds; Effective now    CANCELED: Height and weight    CANCELED: Apply sequential compression device    CANCELED: Apply ARVIND hose    CANCELED: NPO Diet Except: Sips with meds; Effective now    CANCELED: Height and weight    CANCELED: Apply sequential compression device    CANCELED: Apply ARVIND hose      4. Lumbar radiculopathy  Insert and  maintain peripheral IV    Saline lock IV    Type And Screen    povidone-iodine 5 % kit kit    Full code    Admit to inpatient    Insert and maintain peripheral IV    Saline lock IV    Full code    Admit to inpatient    Inpatient consult to Respiratory Care    Inpatient consult to Respiratory Care    CANCELED: NPO Diet Except: Sips with meds; Effective now    CANCELED: Height and weight    CANCELED: Apply sequential compression device    CANCELED: Apply ARVIND hose    CANCELED: NPO Diet Except: Sips with meds; Effective now    CANCELED: Height and weight    CANCELED: Apply sequential compression device    CANCELED: Apply ARVIND hose      5. Degenerative lumbar spinal stenosis  Insert and maintain peripheral IV    Saline lock IV    Type And Screen    povidone-iodine 5 % kit kit    Full code    Admit to inpatient    Insert and maintain peripheral IV    Saline lock IV    Full code    Admit to inpatient    Inpatient consult to Respiratory Care    Inpatient consult to Respiratory Care    CANCELED: NPO Diet Except: Sips with meds; Effective now    CANCELED: Height and weight    CANCELED: Apply sequential compression device    CANCELED: Apply ARVIND hose    CANCELED: NPO Diet Except: Sips with meds; Effective now    CANCELED: Height and weight    CANCELED: Apply sequential compression device    CANCELED: Apply ARVIND hose        OT Visit Info:  OT Received On: 07/10/25  General:  General  Reason for Referral: Pt p/w recurrent back pain and radiculopathy, 7/9 s/p revision/extension T10-pelvis fusion, multilevel decompression and TLIFs (EBL: 2.1L)  Past Medical History Relevant to Rehab: HTN, Afib (not on AC), LEATHA, OA, BPH, depression, prior L3-5 fusion ([facility] 10/2022)  Family/Caregiver Present: No  Prior to Session Communication: Bedside nurse  Patient Position Received: Up in chair, Alarm off, not on at start of session  General Comment: Pt pleasant and cooperative throughout. limited by orthostasis, symptomatic (.03 levo,  "dilaudid)  Precautions:  Hearing/Visual Limitations: WFL with glasses  Medical Precautions: Fall precautions  Post-Surgical Precautions: Spinal precautions  Precautions Comment: SBP <160, MAP >65    Vital Signs        Pre OT During OT (Standing) Post OT   /63 70s/40s 101/56   MAP 78  70         Pain:  Pain Assessment  Pain Assessment: 0-10  0-10 (Numeric) Pain Score:  (did not rate. \"improving\")  Pain Type: Surgical pain  Pain Location: Back    Objective   Cognition:  Overall Cognitive Status: Within Functional Limits  Arousal/Alertness: Appropriate responses to stimuli  Orientation Level: Oriented X4  Following Commands: Follows all commands and directions without difficulty  Insight: Within function limits     Confusion Assessment Method (CAM)  Acute Onset and Fluctuating Course (1A): No  Sanchez Agitation Sedation Scale  Sanchez Agitation Sedation Scale (RASS): Alert and calm  Home Living:  Type of Home: House  Lives With: Spouse  Home Adaptive Equipment: Walker rolling or standard, Scooter (rollator and FWW)  Home Layout: One level  Home Access: Stairs to enter with rails  Entrance Stairs-Rails: Right  Entrance Stairs-Number of Steps: 3  Bathroom Shower/Tub: Walk-in shower, Tub/shower unit  Bathroom Toilet: Standard  Prior Function:  Level of [place]: Independent with ADLs and functional transfers, Independent with homemaking with ambulation  ADL Assistance: Independent  Homemaking Assistance: Independent  Ambulatory Assistance:  (Mod I with rollator. Scooter for community distances)  Hand Dominance: Right  Prior Function Comments: +driving +falls     ADL:  Eating Assistance: Independent  Grooming Assistance: Independent  Bathing Assistance: Minimal  UE Dressing Assistance: Stand by  LE Dressing Assistance: Moderate  LE Dressing Deficit: Thread RLE into pants, Thread LLE into pants  Toileting Assistance with Device: Minimal  ADL Comments: Edu on spine precautions prior to participation. Requires " distal assist during LB dressing tasks d/t limited ability to progress towards figure-4 position.  Activity Tolerance:  Endurance: Tolerates 10 - 20 min exercise with multiple rests (orthostatic hypotension)  Bed Mobility/Transfers: Bed Mobility  Bed Mobility: No    Transfers  Transfer: Yes  Transfer 1  Transfer From 1: Sit to, Chair with arms to  Transfer to 1: Stand  Technique 1: Sit to stand  Transfer Level of Assistance 1: Close supervision  Trials/Comments 1: cues for body mechanics. immediately light headed upon standing, hypotensive      Sitting Balance:  Static Sitting Balance  Static Sitting-Balance Support: Feet supported  Static Sitting-Level of Assistance: Independent  Dynamic Sitting Balance  Dynamic Sitting-Balance Support: Feet supported  Dynamic Sitting-Level of Assistance: Close supervision  Standing Balance:  Static Standing Balance  Static Standing-Balance Support: No upper extremity supported  Static Standing-Level of Assistance: Close supervision     Vision:Vision - Basic Assessment  Current Vision: Wears glasses all the time  Sensation:  Sensation Comment: Reports R hand numbness in ulnar n distribution since surgery. reports improving    Perception:  Inattention/Neglect: Appears intact  Initiation: Appears intact  Motor Planning: Appears intact  Perseveration: Not present  Coordination:  Movements are Fluid and Coordinated: Yes   Hand Function:  Gross Grasp: Functional  Coordination: Functional  Extremities: RUE   RUE : Within Functional Limits and LUE   LUE: Within Functional Limits    Outcome Measures:[facility] Daily Activity  Putting on and taking off regular lower body clothing: A lot  Bathing (including washing, rinsing, drying): A little  Putting on and taking off regular upper body clothing: A little  Toileting, which includes using toilet, bedpan or urinal: A lot  Taking care of personal grooming such as brushing teeth: None  Eating Meals: None  Daily Activity - Total Score: 18    ,  Confusion Assessment Method-ICU (CAM-ICU)  Feature 3: Altered Level of Consciousness: Negative    ICU Mobility Scale: Transferring bed to chair [5], E = Exercise and Early Mobility  ICU Mobility Scale: Transferring bed to chair, Sanchez Agitation Sedation Scale  Sanchez Agitation Sedation Scale (RASS): Alert and calm  , and      Education Documentation  Body Mechanics, taught by Deborah Jacob OT at 7/10/2025  1:45 PM.  Learner: Patient  Readiness: Acceptance  Method: Explanation  Response: Verbalizes Understanding    Precautions, taught by Deborah Jacob OT at 7/10/2025  1:45 PM.  Learner: Patient  Readiness: Acceptance  Method: Explanation  Response: Verbalizes Understanding    ADL Training, taught by Deborha Jacob OT at 7/10/2025  1:45 PM.  Learner: Patient  Readiness: Acceptance  Method: Explanation  Response: Verbalizes Understanding    Education Comments  No comments found.        Goals:  Encounter Problems       Encounter Problems (Active)       ADLs       Patient with complete lower body dressing with minimal assist.       Start:  07/10/25    Expected End:  07/24/25            Patient will complete toileting including hygiene clothing management/hygiene with [place].       Start:  07/10/25    Expected End:  07/24/25               COGNITION/SAFETY       Patient will recall and adhere to spine precautions during all functional mobility/ADL tasks in order to demonstrate improved understanding and promote healing post op       Start:  07/10/25    Expected End:  07/24/25               MOBILITY       Patient will perform Functional mobility with modified independence in order to improve safety and functional mobility.       Start:  07/10/25    Expected End:  07/24/25               TRANSFERS       Patient will perform bed mobility modified independence in order to improve safety and independence with mobility       Start:  07/10/25    Expected End:  07/24/25            Patient will complete  functional transfers with least restrictive device with modified independence       Start:  07/10/25    Expected End:  07/24/25                    SMILEY REIS/YANCI (PRN)

## 2025-07-10 NOTE — PROGRESS NOTES
07/10/25 1144   Discharge Planning   Living Arrangements Spouse/significant other   Support Systems Spouse/significant other   Type of Residence Private residence   Home or Post Acute Services   (out patient PT and OT)   Expected Discharge Disposition Home   Financial Resource Strain   How hard is it for you to pay for the very basics like food, housing, medical care, and heating? Not hard   Housing Stability   In the last 12 months, was there a time when you were not able to pay the mortgage or rent on time? N   At any time in the past 12 months, were you homeless or living in a shelter (including now)? N   Transportation Needs   In the past 12 months, has lack of transportation kept you from medical appointments or from getting medications? no   In the past 12 months, has lack of transportation kept you from meetings, work, or from getting things needed for daily living? No     Social Work Discharge Planning note:    -Patient discussed during interdisciplinary rounds:  -Team members present: Fellow, PT and OT, and SW  -Plan per medical team: Pt is not medically ready for discharge. Will downgrade today.   -Payer: [employer]  -Status: Inpatient  -SDOH: No issues reported.   -Substance use: No issues reported.   -Discharge disposition: Pt is currently with PT recommendations for Low intensity (Pt already has a FWW). SW met with Pt to further discuss discharge planning. SW discussed home care vs out patient PT/OT. Pt reported that he prefers out patient, and plans to resume therapy at his current provider (list of providers was given). Pt reported that his wife will assist in the home as needed, and she will provide transportation to home and to out patient therapy appointments. SW will continue to follow to assist with discharge planning.    -Support to Pt/family: Pt reported there to be no other issues or concerns. SW will continue to follow for emotional/incidental support to Pt/family.    -Potential Barriers:  none  -Anticipated Date of Discharge: 7/12/25    This SW   JORDAN Matos, LSW    Office: [phone]  Secure chat via [employer]

## 2025-07-11 ENCOUNTER — APPOINTMENT (OUTPATIENT)
Dept: RADIOLOGY | Facility: HOSPITAL | Age: 60
DRG: 427 | End: 2025-07-11
Payer: COMMERCIAL

## 2025-07-11 LAB
ALBUMIN SERPL BCP-MCNC: 3.4 G/DL (ref 3.4–5)
ALBUMIN SERPL BCP-MCNC: 3.5 G/DL (ref 3.4–5)
ALBUMIN SERPL BCP-MCNC: 3.7 G/DL (ref 3.4–5)
ANION GAP SERPL CALC-SCNC: 10 MMOL/L (ref 10–20)
ANION GAP SERPL CALC-SCNC: 11 MMOL/L (ref 10–20)
ANION GAP SERPL CALC-SCNC: 12 MMOL/L (ref 10–20)
BASOPHILS # BLD AUTO: 0.01 X10*3/UL (ref 0–0.1)
BASOPHILS NFR BLD AUTO: 0.1 %
BUN SERPL-MCNC: 17 MG/DL (ref 6–23)
BUN SERPL-MCNC: 19 MG/DL (ref 6–23)
BUN SERPL-MCNC: 26 MG/DL (ref 6–23)
CA-I BLD-SCNC: 1.13 MMOL/L (ref 1.1–1.33)
CALCIUM SERPL-MCNC: 7.9 MG/DL (ref 8.6–10.6)
CALCIUM SERPL-MCNC: 8 MG/DL (ref 8.6–10.6)
CALCIUM SERPL-MCNC: 8.2 MG/DL (ref 8.6–10.6)
CHLORIDE SERPL-SCNC: 100 MMOL/L (ref 98–107)
CHLORIDE SERPL-SCNC: 98 MMOL/L (ref 98–107)
CHLORIDE SERPL-SCNC: 98 MMOL/L (ref 98–107)
CO2 SERPL-SCNC: 25 MMOL/L (ref 21–32)
CO2 SERPL-SCNC: 26 MMOL/L (ref 21–32)
CO2 SERPL-SCNC: 26 MMOL/L (ref 21–32)
CREAT SERPL-MCNC: 1.03 MG/DL (ref 0.5–1.3)
CREAT SERPL-MCNC: 1.21 MG/DL (ref 0.5–1.3)
CREAT SERPL-MCNC: 1.45 MG/DL (ref 0.5–1.3)
EGFRCR SERPLBLD CKD-EPI 2021: 55 ML/MIN/1.73M*2
EGFRCR SERPLBLD CKD-EPI 2021: 68 ML/MIN/1.73M*2
EGFRCR SERPLBLD CKD-EPI 2021: 83 ML/MIN/1.73M*2
EOSINOPHIL # BLD AUTO: 0.15 X10*3/UL (ref 0–0.7)
EOSINOPHIL NFR BLD AUTO: 1.1 %
ERYTHROCYTE [DISTWIDTH] IN BLOOD BY AUTOMATED COUNT: 17.4 % (ref 11.5–14.5)
ERYTHROCYTE [DISTWIDTH] IN BLOOD BY AUTOMATED COUNT: 18.6 % (ref 11.5–14.5)
ERYTHROCYTE [DISTWIDTH] IN BLOOD BY AUTOMATED COUNT: 19.3 % (ref 11.5–14.5)
GLUCOSE BLD MANUAL STRIP-MCNC: 119 MG/DL (ref 74–99)
GLUCOSE BLD MANUAL STRIP-MCNC: 122 MG/DL (ref 74–99)
GLUCOSE BLD MANUAL STRIP-MCNC: 154 MG/DL (ref 74–99)
GLUCOSE BLD MANUAL STRIP-MCNC: 184 MG/DL (ref 74–99)
GLUCOSE SERPL-MCNC: 142 MG/DL (ref 74–99)
GLUCOSE SERPL-MCNC: 156 MG/DL (ref 74–99)
GLUCOSE SERPL-MCNC: 99 MG/DL (ref 74–99)
HCT VFR BLD AUTO: 23.2 % (ref 41–52)
HCT VFR BLD AUTO: 24.1 % (ref 41–52)
HCT VFR BLD AUTO: 25 % (ref 41–52)
HGB BLD-MCNC: 7.7 G/DL (ref 13.5–17.5)
HGB BLD-MCNC: 8.4 G/DL (ref 13.5–17.5)
HGB BLD-MCNC: 8.6 G/DL (ref 13.5–17.5)
IMM GRANULOCYTES # BLD AUTO: 0.15 X10*3/UL (ref 0–0.7)
IMM GRANULOCYTES NFR BLD AUTO: 1.1 % (ref 0–0.9)
LYMPHOCYTES # BLD AUTO: 2.03 X10*3/UL (ref 1.2–4.8)
LYMPHOCYTES NFR BLD AUTO: 15.4 %
MAGNESIUM SERPL-MCNC: 1.75 MG/DL (ref 1.6–2.4)
MCH RBC QN AUTO: 31.5 PG (ref 26–34)
MCH RBC QN AUTO: 31.8 PG (ref 26–34)
MCH RBC QN AUTO: 32.5 PG (ref 26–34)
MCHC RBC AUTO-ENTMCNC: 33.2 G/DL (ref 32–36)
MCHC RBC AUTO-ENTMCNC: 34.4 G/DL (ref 32–36)
MCHC RBC AUTO-ENTMCNC: 34.9 G/DL (ref 32–36)
MCV RBC AUTO: 90 FL (ref 80–100)
MCV RBC AUTO: 94 FL (ref 80–100)
MCV RBC AUTO: 96 FL (ref 80–100)
MONOCYTES # BLD AUTO: 3.09 X10*3/UL (ref 0.1–1)
MONOCYTES NFR BLD AUTO: 23.5 %
NEUTROPHILS # BLD AUTO: 7.71 X10*3/UL (ref 1.2–7.7)
NEUTROPHILS NFR BLD AUTO: 58.8 %
NRBC BLD-RTO: 0 /100 WBCS (ref 0–0)
PHOSPHATE SERPL-MCNC: 1.5 MG/DL (ref 2.5–4.9)
PHOSPHATE SERPL-MCNC: 2.3 MG/DL (ref 2.5–4.9)
PHOSPHATE SERPL-MCNC: 2.3 MG/DL (ref 2.5–4.9)
PLATELET # BLD AUTO: 86 X10*3/UL (ref 150–450)
PLATELET # BLD AUTO: 86 X10*3/UL (ref 150–450)
PLATELET # BLD AUTO: 88 X10*3/UL (ref 150–450)
POTASSIUM SERPL-SCNC: 3.4 MMOL/L (ref 3.5–5.3)
POTASSIUM SERPL-SCNC: 3.6 MMOL/L (ref 3.5–5.3)
POTASSIUM SERPL-SCNC: 3.7 MMOL/L (ref 3.5–5.3)
RBC # BLD AUTO: 2.42 X10*6/UL (ref 4.5–5.9)
RBC # BLD AUTO: 2.65 X10*6/UL (ref 4.5–5.9)
RBC # BLD AUTO: 2.67 X10*6/UL (ref 4.5–5.9)
SODIUM SERPL-SCNC: 131 MMOL/L (ref 136–145)
SODIUM SERPL-SCNC: 131 MMOL/L (ref 136–145)
SODIUM SERPL-SCNC: 133 MMOL/L (ref 136–145)
WBC # BLD AUTO: 12.3 X10*3/UL (ref 4.4–11.3)
WBC # BLD AUTO: 13.1 X10*3/UL (ref 4.4–11.3)
WBC # BLD AUTO: 14.9 X10*3/UL (ref 4.4–11.3)

## 2025-07-11 PROCEDURE — 36415 COLL VENOUS BLD VENIPUNCTURE: CPT | Performed by: PHYSICIAN ASSISTANT

## 2025-07-11 PROCEDURE — 2500000002 HC RX 250 W HCPCS SELF ADMINISTERED DRUGS (ALT 637 FOR MEDICARE OP, ALT 636 FOR OP/ED): Performed by: PHYSICIAN ASSISTANT

## 2025-07-11 PROCEDURE — 2500000004 HC RX 250 GENERAL PHARMACY W/ HCPCS (ALT 636 FOR OP/ED)

## 2025-07-11 PROCEDURE — 2500000001 HC RX 250 WO HCPCS SELF ADMINISTERED DRUGS (ALT 637 FOR MEDICARE OP): Performed by: PHYSICIAN ASSISTANT

## 2025-07-11 PROCEDURE — 2500000004 HC RX 250 GENERAL PHARMACY W/ HCPCS (ALT 636 FOR OP/ED): Performed by: PHYSICIAN ASSISTANT

## 2025-07-11 PROCEDURE — 83735 ASSAY OF MAGNESIUM: CPT

## 2025-07-11 PROCEDURE — 2500000002 HC RX 250 W HCPCS SELF ADMINISTERED DRUGS (ALT 637 FOR MEDICARE OP, ALT 636 FOR OP/ED)

## 2025-07-11 PROCEDURE — 74018 RADEX ABDOMEN 1 VIEW: CPT | Performed by: RADIOLOGY

## 2025-07-11 PROCEDURE — 2500000001 HC RX 250 WO HCPCS SELF ADMINISTERED DRUGS (ALT 637 FOR MEDICARE OP): Performed by: REGISTERED NURSE

## 2025-07-11 PROCEDURE — 85025 COMPLETE CBC W/AUTO DIFF WBC: CPT

## 2025-07-11 PROCEDURE — 2500000005 HC RX 250 GENERAL PHARMACY W/O HCPCS: Performed by: PHYSICIAN ASSISTANT

## 2025-07-11 PROCEDURE — 2500000004 HC RX 250 GENERAL PHARMACY W/ HCPCS (ALT 636 FOR OP/ED): Performed by: REGISTERED NURSE

## 2025-07-11 PROCEDURE — 2500000004 HC RX 250 GENERAL PHARMACY W/ HCPCS (ALT 636 FOR OP/ED): Performed by: STUDENT IN AN ORGANIZED HEALTH CARE EDUCATION/TRAINING PROGRAM

## 2025-07-11 PROCEDURE — 82947 ASSAY GLUCOSE BLOOD QUANT: CPT

## 2025-07-11 PROCEDURE — 2500000005 HC RX 250 GENERAL PHARMACY W/O HCPCS: Performed by: REGISTERED NURSE

## 2025-07-11 PROCEDURE — 36415 COLL VENOUS BLD VENIPUNCTURE: CPT

## 2025-07-11 PROCEDURE — 1200000002 HC GENERAL ROOM WITH TELEMETRY DAILY

## 2025-07-11 PROCEDURE — 72131 CT LUMBAR SPINE W/O DYE: CPT

## 2025-07-11 PROCEDURE — 82330 ASSAY OF CALCIUM: CPT

## 2025-07-11 PROCEDURE — 85027 COMPLETE CBC AUTOMATED: CPT | Performed by: PHYSICIAN ASSISTANT

## 2025-07-11 PROCEDURE — 72131 CT LUMBAR SPINE W/O DYE: CPT | Performed by: RADIOLOGY

## 2025-07-11 PROCEDURE — 2500000001 HC RX 250 WO HCPCS SELF ADMINISTERED DRUGS (ALT 637 FOR MEDICARE OP): Performed by: STUDENT IN AN ORGANIZED HEALTH CARE EDUCATION/TRAINING PROGRAM

## 2025-07-11 PROCEDURE — 80069 RENAL FUNCTION PANEL: CPT | Performed by: PHYSICIAN ASSISTANT

## 2025-07-11 PROCEDURE — 97116 GAIT TRAINING THERAPY: CPT | Mod: GP

## 2025-07-11 PROCEDURE — 2500000001 HC RX 250 WO HCPCS SELF ADMINISTERED DRUGS (ALT 637 FOR MEDICARE OP)

## 2025-07-11 PROCEDURE — 80069 RENAL FUNCTION PANEL: CPT

## 2025-07-11 PROCEDURE — 97530 THERAPEUTIC ACTIVITIES: CPT | Mod: GP

## 2025-07-11 PROCEDURE — 74018 RADEX ABDOMEN 1 VIEW: CPT

## 2025-07-11 PROCEDURE — P9016 RBC LEUKOCYTES REDUCED: HCPCS

## 2025-07-11 PROCEDURE — 36430 TRANSFUSION BLD/BLD COMPNT: CPT

## 2025-07-11 PROCEDURE — 2500000004 HC RX 250 GENERAL PHARMACY W/ HCPCS (ALT 636 FOR OP/ED): Mod: JW

## 2025-07-11 PROCEDURE — 84100 ASSAY OF PHOSPHORUS: CPT | Performed by: PHYSICIAN ASSISTANT

## 2025-07-11 RX ORDER — POTASSIUM CHLORIDE 20 MEQ/1
40 TABLET, EXTENDED RELEASE ORAL ONCE
Status: COMPLETED | OUTPATIENT
Start: 2025-07-11 | End: 2025-07-11

## 2025-07-11 RX ORDER — LIDOCAINE 560 MG/1
1 PATCH PERCUTANEOUS; TOPICAL; TRANSDERMAL DAILY
Status: DISCONTINUED | OUTPATIENT
Start: 2025-07-11 | End: 2025-07-14 | Stop reason: HOSPADM

## 2025-07-11 RX ORDER — SYRING-NEEDL,DISP,INSUL,0.3 ML 29 G X1/2"
296 SYRINGE, EMPTY DISPOSABLE MISCELLANEOUS ONCE
Status: COMPLETED | OUTPATIENT
Start: 2025-07-11 | End: 2025-07-11

## 2025-07-11 RX ORDER — LIDOCAINE 560 MG/1
1 PATCH PERCUTANEOUS; TOPICAL; TRANSDERMAL DAILY
Qty: 7 PATCH | Refills: 0 | Status: CANCELLED | OUTPATIENT
Start: 2025-07-12 | End: 2025-07-19

## 2025-07-11 RX ORDER — POLYETHYLENE GLYCOL 3350 17 G/17G
17 POWDER, FOR SOLUTION ORAL 3 TIMES DAILY
Status: DISCONTINUED | OUTPATIENT
Start: 2025-07-11 | End: 2025-07-14 | Stop reason: HOSPADM

## 2025-07-11 RX ORDER — ACETAMINOPHEN 10 MG/ML
1000 INJECTION, SOLUTION INTRAVENOUS EVERY 6 HOURS
Status: DISCONTINUED | OUTPATIENT
Start: 2025-07-11 | End: 2025-07-11

## 2025-07-11 RX ORDER — METHOCARBAMOL 100 MG/ML
1000 INJECTION, SOLUTION INTRAMUSCULAR; INTRAVENOUS EVERY 8 HOURS PRN
Status: DISCONTINUED | OUTPATIENT
Start: 2025-07-11 | End: 2025-07-12

## 2025-07-11 RX ORDER — FOLIC ACID 1 MG/1
1 TABLET ORAL DAILY
Status: DISCONTINUED | OUTPATIENT
Start: 2025-07-11 | End: 2025-07-14 | Stop reason: HOSPADM

## 2025-07-11 RX ORDER — HYDROMORPHONE HYDROCHLORIDE 1 MG/ML
INJECTION, SOLUTION INTRAMUSCULAR; INTRAVENOUS; SUBCUTANEOUS
Status: COMPLETED
Start: 2025-07-11 | End: 2025-07-11

## 2025-07-11 RX ORDER — POLYETHYLENE GLYCOL 3350 17 G/17G
17 POWDER, FOR SOLUTION ORAL 3 TIMES DAILY
Qty: 21 PACKET | Refills: 0 | Status: CANCELLED | OUTPATIENT
Start: 2025-07-11 | End: 2025-07-18

## 2025-07-11 RX ORDER — KETOROLAC TROMETHAMINE 15 MG/ML
15 INJECTION, SOLUTION INTRAMUSCULAR; INTRAVENOUS EVERY 6 HOURS PRN
Status: DISCONTINUED | OUTPATIENT
Start: 2025-07-11 | End: 2025-07-11

## 2025-07-11 RX ORDER — CYCLOBENZAPRINE HCL 10 MG
10 TABLET ORAL 3 TIMES DAILY PRN
Qty: 21 TABLET | Refills: 0 | Status: CANCELLED | OUTPATIENT
Start: 2025-07-11 | End: 2025-07-18

## 2025-07-11 RX ORDER — BISACODYL 10 MG/1
10 SUPPOSITORY RECTAL ONCE
Status: COMPLETED | OUTPATIENT
Start: 2025-07-11 | End: 2025-07-11

## 2025-07-11 RX ORDER — MULTIVIT-MIN/IRON FUM/FOLIC AC 7.5 MG-4
1 TABLET ORAL DAILY
Status: DISCONTINUED | OUTPATIENT
Start: 2025-07-11 | End: 2025-07-14 | Stop reason: HOSPADM

## 2025-07-11 RX ORDER — LANOLIN ALCOHOL/MO/W.PET/CERES
100 CREAM (GRAM) TOPICAL DAILY
Status: DISCONTINUED | OUTPATIENT
Start: 2025-07-11 | End: 2025-07-14 | Stop reason: HOSPADM

## 2025-07-11 RX ORDER — ACETAMINOPHEN 325 MG/1
650 TABLET ORAL EVERY 6 HOURS
Qty: 56 TABLET | Refills: 0 | Status: CANCELLED | OUTPATIENT
Start: 2025-07-11 | End: 2025-07-18

## 2025-07-11 RX ORDER — HYDROMORPHONE HYDROCHLORIDE 1 MG/ML
0.2 INJECTION, SOLUTION INTRAMUSCULAR; INTRAVENOUS; SUBCUTANEOUS
Status: DISCONTINUED | OUTPATIENT
Start: 2025-07-11 | End: 2025-07-11

## 2025-07-11 RX ORDER — DIPHENHYDRAMINE HCL 25 MG
25 CAPSULE ORAL EVERY 6 HOURS PRN
Status: DISCONTINUED | OUTPATIENT
Start: 2025-07-11 | End: 2025-07-14 | Stop reason: HOSPADM

## 2025-07-11 RX ORDER — OXYCODONE HYDROCHLORIDE 5 MG/1
5 TABLET ORAL EVERY 6 HOURS PRN
Qty: 28 TABLET | Refills: 0 | Status: CANCELLED | OUTPATIENT
Start: 2025-07-11 | End: 2025-07-18

## 2025-07-11 RX ORDER — AMOXICILLIN 250 MG
2 CAPSULE ORAL 2 TIMES DAILY
Qty: 28 TABLET | Refills: 0 | Status: CANCELLED | OUTPATIENT
Start: 2025-07-11 | End: 2025-07-18

## 2025-07-11 RX ADMIN — Medication 1 TABLET: at 08:17

## 2025-07-11 RX ADMIN — FLECAINIDE ACETATE 100 MG: 100 TABLET ORAL at 08:24

## 2025-07-11 RX ADMIN — POLYETHYLENE GLYCOL 3350 17 G: 17 POWDER, FOR SOLUTION ORAL at 08:17

## 2025-07-11 RX ADMIN — METOPROLOL TARTRATE 25 MG: 25 TABLET, FILM COATED ORAL at 20:59

## 2025-07-11 RX ADMIN — DIPHENHYDRAMINE HYDROCHLORIDE 25 MG: 25 CAPSULE ORAL at 00:40

## 2025-07-11 RX ADMIN — HEPARIN SODIUM 5000 UNITS: 5000 INJECTION INTRAVENOUS; SUBCUTANEOUS at 05:58

## 2025-07-11 RX ADMIN — INSULIN LISPRO 1 UNITS: 100 INJECTION, SOLUTION INTRAVENOUS; SUBCUTANEOUS at 08:50

## 2025-07-11 RX ADMIN — CYCLOBENZAPRINE 10 MG: 10 TABLET, FILM COATED ORAL at 08:17

## 2025-07-11 RX ADMIN — LIDOCAINE 4% 1 PATCH: 40 PATCH TOPICAL at 06:34

## 2025-07-11 RX ADMIN — POLYETHYLENE GLYCOL 3350 17 G: 17 POWDER, FOR SOLUTION ORAL at 20:59

## 2025-07-11 RX ADMIN — METHOCARBAMOL 1000 MG: 1000 INJECTION, SOLUTION INTRAMUSCULAR; INTRAVENOUS at 09:59

## 2025-07-11 RX ADMIN — SENNOSIDES AND DOCUSATE SODIUM 2 TABLET: 50; 8.6 TABLET ORAL at 21:01

## 2025-07-11 RX ADMIN — ACETAMINOPHEN 1000 MG: 10 INJECTION INTRAVENOUS at 07:37

## 2025-07-11 RX ADMIN — METOPROLOL TARTRATE 25 MG: 25 TABLET, FILM COATED ORAL at 08:17

## 2025-07-11 RX ADMIN — PREDNISOLONE ACETATE 1 DROP: 10 SUSPENSION/ DROPS OPHTHALMIC at 21:05

## 2025-07-11 RX ADMIN — OXYCODONE HYDROCHLORIDE 10 MG: 5 TABLET ORAL at 20:59

## 2025-07-11 RX ADMIN — METHOCARBAMOL 1000 MG: 100 INJECTION INTRAMUSCULAR; INTRAVENOUS at 20:59

## 2025-07-11 RX ADMIN — PREDNISOLONE ACETATE 1 DROP: 10 SUSPENSION/ DROPS OPHTHALMIC at 12:17

## 2025-07-11 RX ADMIN — ACETAMINOPHEN 650 MG: 325 TABLET ORAL at 21:05

## 2025-07-11 RX ADMIN — HEPARIN SODIUM 5000 UNITS: 5000 INJECTION INTRAVENOUS; SUBCUTANEOUS at 14:07

## 2025-07-11 RX ADMIN — BUPROPION HYDROCHLORIDE 150 MG: 150 TABLET, EXTENDED RELEASE ORAL at 08:17

## 2025-07-11 RX ADMIN — PANTOPRAZOLE SODIUM 20 MG: 20 TABLET, DELAYED RELEASE ORAL at 08:17

## 2025-07-11 RX ADMIN — SODIUM PHOSPHATE, MONOBASIC, MONOHYDRATE AND SODIUM PHOSPHATE, DIBASIC, ANHYDROUS 15 MMOL: 142; 276 INJECTION, SOLUTION INTRAVENOUS at 20:58

## 2025-07-11 RX ADMIN — FLECAINIDE ACETATE 100 MG: 100 TABLET ORAL at 20:59

## 2025-07-11 RX ADMIN — OXYCODONE HYDROCHLORIDE 10 MG: 5 TABLET ORAL at 00:40

## 2025-07-11 RX ADMIN — CYCLOBENZAPRINE 10 MG: 10 TABLET, FILM COATED ORAL at 16:01

## 2025-07-11 RX ADMIN — SENNOSIDES AND DOCUSATE SODIUM 2 TABLET: 50; 8.6 TABLET ORAL at 08:17

## 2025-07-11 RX ADMIN — OXYCODONE HYDROCHLORIDE 5 MG: 5 TABLET ORAL at 16:00

## 2025-07-11 RX ADMIN — FOLIC ACID 1 MG: 1 TABLET ORAL at 08:17

## 2025-07-11 RX ADMIN — KETOROLAC TROMETHAMINE 15 MG: 15 INJECTION, SOLUTION INTRAMUSCULAR; INTRAVENOUS at 09:59

## 2025-07-11 RX ADMIN — BISACODYL 10 MG: 10 SUPPOSITORY RECTAL at 05:49

## 2025-07-11 RX ADMIN — DIPHENHYDRAMINE HYDROCHLORIDE 25 MG: 25 CAPSULE ORAL at 21:49

## 2025-07-11 RX ADMIN — MAGNESIUM CITRATE 296 ML: 1.75 LIQUID ORAL at 05:57

## 2025-07-11 RX ADMIN — POLYETHYLENE GLYCOL 3350 17 G: 17 POWDER, FOR SOLUTION ORAL at 14:07

## 2025-07-11 RX ADMIN — ACETAMINOPHEN 1000 MG: 10 INJECTION INTRAVENOUS at 12:17

## 2025-07-11 RX ADMIN — HYDROMORPHONE HYDROCHLORIDE 0.2 MG: 1 INJECTION, SOLUTION INTRAMUSCULAR; INTRAVENOUS; SUBCUTANEOUS at 03:05

## 2025-07-11 RX ADMIN — POTASSIUM CHLORIDE 40 MEQ: 1500 TABLET, EXTENDED RELEASE ORAL at 17:57

## 2025-07-11 RX ADMIN — HEPARIN SODIUM 5000 UNITS: 5000 INJECTION INTRAVENOUS; SUBCUTANEOUS at 21:05

## 2025-07-11 RX ADMIN — THIAMINE HCL TAB 100 MG 100 MG: 100 TAB at 08:17

## 2025-07-11 RX ADMIN — SODIUM CHLORIDE 100 ML/HR: 0.9 INJECTION, SOLUTION INTRAVENOUS at 11:00

## 2025-07-11 RX ADMIN — POTASSIUM PHOSPHATE, MONOBASIC AND POTASSIUM PHOSPHATE, DIBASIC 15 MMOL: 224; 236 INJECTION, SOLUTION, CONCENTRATE INTRAVENOUS at 06:52

## 2025-07-11 ASSESSMENT — PAIN DESCRIPTION - DESCRIPTORS: DESCRIPTORS: ACHING;THROBBING

## 2025-07-11 ASSESSMENT — PAIN SCALES - GENERAL
PAINLEVEL_OUTOF10: 6
PAINLEVEL_OUTOF10: 8
PAINLEVEL_OUTOF10: 5 - MODERATE PAIN
PAINLEVEL_OUTOF10: 9
PAINLEVEL_OUTOF10: 9
PAINLEVEL_OUTOF10: 4
PAINLEVEL_OUTOF10: 9
PAINLEVEL_OUTOF10: 3
PAINLEVEL_OUTOF10: 7
PAINLEVEL_OUTOF10: 8
PAINLEVEL_OUTOF10: 7

## 2025-07-11 ASSESSMENT — PAIN - FUNCTIONAL ASSESSMENT
PAIN_FUNCTIONAL_ASSESSMENT: 0-10

## 2025-07-11 ASSESSMENT — COGNITIVE AND FUNCTIONAL STATUS - GENERAL
CLIMB 3 TO 5 STEPS WITH RAILING: A LOT
MOVING TO AND FROM BED TO CHAIR: A LITTLE
TURNING FROM BACK TO SIDE WHILE IN FLAT BAD: A LITTLE
STANDING UP FROM CHAIR USING ARMS: A LITTLE
WALKING IN HOSPITAL ROOM: A LITTLE
MOVING FROM LYING ON BACK TO SITTING ON SIDE OF FLAT BED WITH BEDRAILS: A LITTLE
MOBILITY SCORE: 17

## 2025-07-11 ASSESSMENT — PAIN DESCRIPTION - LOCATION: LOCATION: BACK

## 2025-07-11 NOTE — PROGRESS NOTES
Physical Therapy    Physical Therapy Treatment    Patient Name: Kvng June  MRN: [id number]  Department: [facility]  Room: 10/10-A  Today's Date: 7/11/2025  Time Calculation  Start Time: 1425  Stop Time: 1504  Time Calculation (min): 39 min    Assessment/Plan   PT Assessment  Barriers to Discharge Home: No anticipated barriers  Evaluation/Treatment Tolerance: Patient tolerated treatment well  Medical Staff Made Aware: Yes  End of Session Communication: Bedside nurse  Assessment Comment: Pt motivated with excellent effort.  Able to ambulate in room several times today.  Remains appropriate for low intensity PT at time of d/c  End of Session Patient Position: Bed, 3 rail up, Alarm off, not on at start of session     PT Plan  Treatment/Interventions: Bed mobility, Transfer training, Gait training, Stair training, Balance training, Neuromuscular re-education, Strengthening, Endurance training, Therapeutic exercise, Therapeutic activity, Home exercise program, Postural re-education  PT Plan: Ongoing PT  PT Frequency: Daily  PT Discharge Recommendations: Low intensity level of continued care  Equipment Recommended upon Discharge: Other (comment) (No device needs)  PT Recommended Transfer Status: Assist x1, Assistive device  PT - OK to Discharge: Yes    PT Visit Info:  PT Received On: 07/11/25  Response to Previous Treatment: Patient with no complaints from previous session.     General Visit Information:   General  Family/Caregiver Present: No  Prior to Session Communication: Bedside nurse  Patient Position Received: Bed, 3 rail up, Alarm off, not on at start of session  General Comment: Pt pleasant, cooperative and agreeable to PT.  Able to ambulate in room several times today.  Tolerated well.    Subjective   Precautions:  Precautions  Medical Precautions: Fall precautions  Post-Surgical Precautions: Spinal precautions (Able to recall 3/3)  Precautions Comment: SBP <160    Objective   Pain:  Pain Assessment  Pain  Assessment: 0-10  0-10 (Numeric) Pain Score: 3  Pain Type: Surgical pain  Pain Location: Back  Pain Interventions: Ambulation/increased activity, Repositioned  Response to Interventions: Resting quietly  Cognition:  Cognition  Overall Cognitive Status: Within Functional Limits  Arousal/Alertness: Appropriate responses to stimuli  Orientation Level: Oriented X4  Following Commands: Follows all commands and directions without difficulty  Coordination:  Movements are Fluid and Coordinated: Yes  Postural Control:  Postural Control  Postural Control: Within Functional Limits    Activity Tolerance:  Activity Tolerance  Endurance: Endurance does not limit participation in activity  Treatments:    Bed Mobility 1  Bed Mobility 1: Supine to sitting, Sitting to supine  Level of Assistance 1: Minimum assistance, Minimal verbal cues    Ambulation/Gait Training 1  Surface 1: Level tile  Device 1: Rolling walker  Assistance 1: Contact guard  Quality of Gait 1: Narrow base of support, Diminished heel strike, Decreased step length  Comments/Distance (ft) 1: 30 feet, 40 feet (back and forth in room)  Transfer 1  Transfer From 1: Sit to, Stand to  Transfer to 1: Sit  Transfer Device 1: Walker  Transfer Level of Assistance 1: Close supervision, Minimal verbal cues  Transfers 2  Transfer From 2: Bed to, Commode-standard to  Transfer to 2: Bed  Transfer Device 2: Walker  Transfer Level of Assistance 2: Contact guard, Minimal verbal cues    Stairs  Stairs: No    Outcome Measures:    [facility] Basic Mobility  Turning from your back to your side while in a flat bed without using bedrails: A little  Moving from lying on your back to sitting on the side of a flat bed without using bedrails: A little  Moving to and from bed to chair (including a wheelchair): A little  Standing up from a chair using your arms (e.g. wheelchair or bedside chair): A little  To walk in hospital room: A little  Climbing 3-5 steps with railing: A lot  Basic Mobility -  Total Score: 17    FSS-ICU  Ambulation: Walks <50 feet with any assistance x1 or walks any distance with assistance x2 people  Rolling: Supervision or set-up only  Sitting: Supervision or set-up only  Transfer Sit-to-Stand: Minimal assistance (performs 75% or more of task)  Transfer Supine-to-Sit: Minimal assistance (performs 75% or more of task)  Total Score: 19    Education Documentation  Mobility Training, taught by Manny Mejia, PT at 7/11/2025  4:01 PM.  Learner: Patient  Readiness: Eager  Method: Explanation  Response: Verbalizes Understanding    Education Comments  No comments found.    OP EDUCATION:       Encounter Problems       Encounter Problems (Active)       PT Problem       Patient will perform bed mobility IND to reduce risk of developing decubitus ulcers.  (Progressing)       Start:  07/10/25    Expected End:  07/24/25            Patient will perform sit to stand and stand to sit transfers MOD-I with LRD to increase functional strength.   (Progressing)       Start:  07/10/25    Expected End:  07/24/25            Patient will ambulate at least 250  ft. MOD-I with LRD to improve tolerance of community distances.     (Progressing)       Start:  07/10/25    Expected End:  07/24/25            Patient will ascend and descend >/= 3 steps with railing and </= close sup to facilitate safe navigation of stairs in the home.    (Progressing)       Start:  07/10/25    Expected End:  07/24/25            Patient will score >/= 24/28 points on the Tinetti Performance Oriented Mobility Assessment to indicate low falls risk. (Progressing)       Start:  07/10/25    Expected End:  07/24/25

## 2025-07-11 NOTE — PROGRESS NOTES
Subjective  No acute events overnight    Neurosurgery Attending  DD  DOS: 7/9      Examination  LLE DF 4  Otherwise intact      Vitals:    07/11/25 0900   BP: 116/84   Pulse: 91   Resp: 21   Temp:    SpO2: 93%         Assessment  H/o HTN, Afib (not on AC), LEATHA, OA, BPH, depression, [facility] p/w recurrent back pain and radiculopathy, 7/9 s/p revision/extension T10-pelvis fusion, multilevel decompression and TLIFs (EBL: 2.1L), 7/10 hgb 7.4 s/p 1u pRBC, hgb 7.7 s/p 2U pRBC, 7/11 L DF weakness, CT LS hardware in posn      Plan  DALIA, drains x2/Prevena, q12 labs (hgb > 8), EOS when able, NPO (ileus), PTOT-OP  Post tx cbc  BM  CT L Spine f/u final read      Neurosurgery Medications  DEX: none  ABX: none  AED: none  DVT: SQH      Other Meds  (Hygroton, losartan)   NS 100cc/hr

## 2025-07-11 NOTE — CONSULTS
Wound Care Consult     Visit Date: 7/11/2025      Patient Name: Kvng June         MRN: [id number]             Reason for Consult: pressure injury to chest s/p OR          Assessment:  Wound 07/10/25 Pressure Injury Sternum Bilateral (Active)   Date First Assessed: 07/10/25   Present on Original Admission: No  Primary Wound Type: Pressure Injury  Location: Sternum  Wound Location Orientation: Bilateral      Assessments 7/11/2025 12:25 PM   Wound Image      Present on Admission to Healthcare Facility No   Site Assessment Intact;Non-blanchable erythema   Lakeshia-Wound Assessment Dry   Pressure Injury Stage Stage 1   Shape nipplw to nipple linear but intermittent non blanchable erythema (4cm x 12cm area)   Drainage Description None   Drainage Amount None   Dressing Open to air       No associated orders.     Negative Pressure Wound Therapy Back (Active)   Unit Type Disposable 07/11/25 0400   Dressing Type Other (Comment) 07/11/25 1200   Number of Foam Pieces Used 1 07/09/25 2100   Cycle Continuous 07/11/25 1200   Target Pressure (mmHg) 125 07/11/25 1200   Canister Changed No 07/09/25 1930   Output (mL) 0 mL 07/09/25 2100     Wound Plan:  Patient s/p long OR time in prone position. Sternum and bilateral chest/nipple area with non blanchable erythema. Patient states area is not painful; tissue is currently intact. No dressing needed; leave open to air.      Michelle Browning RN  7/11/2025  12:27 PM

## 2025-07-12 LAB
ALBUMIN SERPL BCP-MCNC: 3.6 G/DL (ref 3.4–5)
ALBUMIN SERPL BCP-MCNC: 3.8 G/DL (ref 3.4–5)
ANION GAP SERPL CALC-SCNC: 10 MMOL/L (ref 10–20)
ANION GAP SERPL CALC-SCNC: 12 MMOL/L (ref 10–20)
BLOOD EXPIRATION DATE: NORMAL
BLOOD EXPIRATION DATE: NORMAL
BUN SERPL-MCNC: 12 MG/DL (ref 6–23)
BUN SERPL-MCNC: 14 MG/DL (ref 6–23)
CALCIUM SERPL-MCNC: 8.7 MG/DL (ref 8.6–10.6)
CALCIUM SERPL-MCNC: 8.8 MG/DL (ref 8.6–10.6)
CHLORIDE SERPL-SCNC: 97 MMOL/L (ref 98–107)
CHLORIDE SERPL-SCNC: 98 MMOL/L (ref 98–107)
CO2 SERPL-SCNC: 27 MMOL/L (ref 21–32)
CO2 SERPL-SCNC: 27 MMOL/L (ref 21–32)
CREAT SERPL-MCNC: 0.98 MG/DL (ref 0.5–1.3)
CREAT SERPL-MCNC: 1.19 MG/DL (ref 0.5–1.3)
DISPENSE STATUS: NORMAL
DISPENSE STATUS: NORMAL
EGFRCR SERPLBLD CKD-EPI 2021: 69 ML/MIN/1.73M*2
EGFRCR SERPLBLD CKD-EPI 2021: 88 ML/MIN/1.73M*2
ERYTHROCYTE [DISTWIDTH] IN BLOOD BY AUTOMATED COUNT: 18 % (ref 11.5–14.5)
ERYTHROCYTE [DISTWIDTH] IN BLOOD BY AUTOMATED COUNT: 18.7 % (ref 11.5–14.5)
GLUCOSE BLD MANUAL STRIP-MCNC: 107 MG/DL (ref 74–99)
GLUCOSE BLD MANUAL STRIP-MCNC: 119 MG/DL (ref 74–99)
GLUCOSE BLD MANUAL STRIP-MCNC: 144 MG/DL (ref 74–99)
GLUCOSE BLD MANUAL STRIP-MCNC: 151 MG/DL (ref 74–99)
GLUCOSE SERPL-MCNC: 103 MG/DL (ref 74–99)
GLUCOSE SERPL-MCNC: 119 MG/DL (ref 74–99)
HCT VFR BLD AUTO: 26.2 % (ref 41–52)
HCT VFR BLD AUTO: 26.8 % (ref 41–52)
HGB BLD-MCNC: 8.8 G/DL (ref 13.5–17.5)
HGB BLD-MCNC: 8.9 G/DL (ref 13.5–17.5)
MAGNESIUM SERPL-MCNC: 2.11 MG/DL (ref 1.6–2.4)
MCH RBC QN AUTO: 32.5 PG (ref 26–34)
MCH RBC QN AUTO: 32.8 PG (ref 26–34)
MCHC RBC AUTO-ENTMCNC: 33.2 G/DL (ref 32–36)
MCHC RBC AUTO-ENTMCNC: 33.6 G/DL (ref 32–36)
MCV RBC AUTO: 97 FL (ref 80–100)
MCV RBC AUTO: 99 FL (ref 80–100)
NRBC BLD-RTO: 0 /100 WBCS (ref 0–0)
NRBC BLD-RTO: 0.2 /100 WBCS (ref 0–0)
PHOSPHATE SERPL-MCNC: 2.1 MG/DL (ref 2.5–4.9)
PHOSPHATE SERPL-MCNC: 2.6 MG/DL (ref 2.5–4.9)
PLATELET # BLD AUTO: 101 X10*3/UL (ref 150–450)
PLATELET # BLD AUTO: 108 X10*3/UL (ref 150–450)
POTASSIUM SERPL-SCNC: 3.9 MMOL/L (ref 3.5–5.3)
POTASSIUM SERPL-SCNC: 4.2 MMOL/L (ref 3.5–5.3)
PRODUCT BLOOD TYPE: 5100
PRODUCT BLOOD TYPE: NORMAL
PRODUCT CODE: NORMAL
PRODUCT CODE: NORMAL
RBC # BLD AUTO: 2.71 X10*6/UL (ref 4.5–5.9)
RBC # BLD AUTO: 2.71 X10*6/UL (ref 4.5–5.9)
SODIUM SERPL-SCNC: 131 MMOL/L (ref 136–145)
SODIUM SERPL-SCNC: 132 MMOL/L (ref 136–145)
UNIT ABO: NORMAL
UNIT ABO: NORMAL
UNIT NUMBER: NORMAL
UNIT NUMBER: NORMAL
UNIT RH: NORMAL
UNIT RH: NORMAL
UNIT VOLUME: 312
UNIT VOLUME: 350
WBC # BLD AUTO: 13 X10*3/UL (ref 4.4–11.3)
WBC # BLD AUTO: 13 X10*3/UL (ref 4.4–11.3)
XM INTEP: NORMAL
XM INTEP: NORMAL

## 2025-07-12 PROCEDURE — 82947 ASSAY GLUCOSE BLOOD QUANT: CPT

## 2025-07-12 PROCEDURE — 1200000002 HC GENERAL ROOM WITH TELEMETRY DAILY

## 2025-07-12 PROCEDURE — 97116 GAIT TRAINING THERAPY: CPT | Mod: GP,CQ

## 2025-07-12 PROCEDURE — 2500000001 HC RX 250 WO HCPCS SELF ADMINISTERED DRUGS (ALT 637 FOR MEDICARE OP): Performed by: PHYSICIAN ASSISTANT

## 2025-07-12 PROCEDURE — 2500000001 HC RX 250 WO HCPCS SELF ADMINISTERED DRUGS (ALT 637 FOR MEDICARE OP)

## 2025-07-12 PROCEDURE — 2500000004 HC RX 250 GENERAL PHARMACY W/ HCPCS (ALT 636 FOR OP/ED): Performed by: PHYSICIAN ASSISTANT

## 2025-07-12 PROCEDURE — 85027 COMPLETE CBC AUTOMATED: CPT | Performed by: PHYSICIAN ASSISTANT

## 2025-07-12 PROCEDURE — 83735 ASSAY OF MAGNESIUM: CPT | Performed by: PHYSICIAN ASSISTANT

## 2025-07-12 PROCEDURE — 2500000004 HC RX 250 GENERAL PHARMACY W/ HCPCS (ALT 636 FOR OP/ED): Performed by: STUDENT IN AN ORGANIZED HEALTH CARE EDUCATION/TRAINING PROGRAM

## 2025-07-12 PROCEDURE — 2500000005 HC RX 250 GENERAL PHARMACY W/O HCPCS: Performed by: REGISTERED NURSE

## 2025-07-12 PROCEDURE — 2500000001 HC RX 250 WO HCPCS SELF ADMINISTERED DRUGS (ALT 637 FOR MEDICARE OP): Performed by: STUDENT IN AN ORGANIZED HEALTH CARE EDUCATION/TRAINING PROGRAM

## 2025-07-12 PROCEDURE — 36415 COLL VENOUS BLD VENIPUNCTURE: CPT | Performed by: PHYSICIAN ASSISTANT

## 2025-07-12 PROCEDURE — 2500000002 HC RX 250 W HCPCS SELF ADMINISTERED DRUGS (ALT 637 FOR MEDICARE OP, ALT 636 FOR OP/ED)

## 2025-07-12 PROCEDURE — 80069 RENAL FUNCTION PANEL: CPT | Performed by: PHYSICIAN ASSISTANT

## 2025-07-12 PROCEDURE — 2500000004 HC RX 250 GENERAL PHARMACY W/ HCPCS (ALT 636 FOR OP/ED)

## 2025-07-12 PROCEDURE — 97530 THERAPEUTIC ACTIVITIES: CPT | Mod: GP,CQ

## 2025-07-12 PROCEDURE — 2500000001 HC RX 250 WO HCPCS SELF ADMINISTERED DRUGS (ALT 637 FOR MEDICARE OP): Performed by: REGISTERED NURSE

## 2025-07-12 RX ORDER — METHOCARBAMOL 500 MG/1
1000 TABLET, FILM COATED ORAL EVERY 8 HOURS PRN
Status: DISCONTINUED | OUTPATIENT
Start: 2025-07-12 | End: 2025-07-14 | Stop reason: HOSPADM

## 2025-07-12 RX ORDER — HYDROMORPHONE HYDROCHLORIDE 1 MG/ML
0.2 INJECTION, SOLUTION INTRAMUSCULAR; INTRAVENOUS; SUBCUTANEOUS ONCE
Status: COMPLETED | OUTPATIENT
Start: 2025-07-12 | End: 2025-07-12

## 2025-07-12 RX ORDER — HYDROMORPHONE HYDROCHLORIDE 1 MG/ML
INJECTION, SOLUTION INTRAMUSCULAR; INTRAVENOUS; SUBCUTANEOUS
Status: COMPLETED
Start: 2025-07-12 | End: 2025-07-12

## 2025-07-12 RX ADMIN — METOPROLOL TARTRATE 25 MG: 25 TABLET, FILM COATED ORAL at 08:50

## 2025-07-12 RX ADMIN — POLYETHYLENE GLYCOL 3350 17 G: 17 POWDER, FOR SOLUTION ORAL at 20:03

## 2025-07-12 RX ADMIN — DIPHENHYDRAMINE HYDROCHLORIDE 25 MG: 25 CAPSULE ORAL at 20:03

## 2025-07-12 RX ADMIN — ACETAMINOPHEN 650 MG: 325 TABLET ORAL at 15:26

## 2025-07-12 RX ADMIN — HEPARIN SODIUM 5000 UNITS: 5000 INJECTION INTRAVENOUS; SUBCUTANEOUS at 22:08

## 2025-07-12 RX ADMIN — BUPROPION HYDROCHLORIDE 150 MG: 150 TABLET, EXTENDED RELEASE ORAL at 08:51

## 2025-07-12 RX ADMIN — CYCLOBENZAPRINE 10 MG: 10 TABLET, FILM COATED ORAL at 08:50

## 2025-07-12 RX ADMIN — OXYCODONE HYDROCHLORIDE 10 MG: 5 TABLET ORAL at 06:45

## 2025-07-12 RX ADMIN — HYDROMORPHONE HYDROCHLORIDE 0.2 MG: 1 INJECTION, SOLUTION INTRAMUSCULAR; INTRAVENOUS; SUBCUTANEOUS at 00:48

## 2025-07-12 RX ADMIN — METOPROLOL TARTRATE 25 MG: 25 TABLET, FILM COATED ORAL at 20:03

## 2025-07-12 RX ADMIN — POLYETHYLENE GLYCOL 3350 17 G: 17 POWDER, FOR SOLUTION ORAL at 15:05

## 2025-07-12 RX ADMIN — ACETAMINOPHEN 650 MG: 325 TABLET ORAL at 20:06

## 2025-07-12 RX ADMIN — FLECAINIDE ACETATE 100 MG: 100 TABLET ORAL at 20:03

## 2025-07-12 RX ADMIN — SENNOSIDES AND DOCUSATE SODIUM 2 TABLET: 50; 8.6 TABLET ORAL at 20:03

## 2025-07-12 RX ADMIN — FOLIC ACID 1 MG: 1 TABLET ORAL at 08:50

## 2025-07-12 RX ADMIN — PREDNISOLONE ACETATE 1 DROP: 10 SUSPENSION/ DROPS OPHTHALMIC at 20:04

## 2025-07-12 RX ADMIN — HEPARIN SODIUM 5000 UNITS: 5000 INJECTION INTRAVENOUS; SUBCUTANEOUS at 06:44

## 2025-07-12 RX ADMIN — METHOCARBAMOL 1000 MG: 500 TABLET ORAL at 14:33

## 2025-07-12 RX ADMIN — OXYCODONE HYDROCHLORIDE 10 MG: 5 TABLET ORAL at 01:42

## 2025-07-12 RX ADMIN — INSULIN LISPRO 1 UNITS: 100 INJECTION, SOLUTION INTRAVENOUS; SUBCUTANEOUS at 12:13

## 2025-07-12 RX ADMIN — ACETAMINOPHEN 650 MG: 325 TABLET ORAL at 04:30

## 2025-07-12 RX ADMIN — OXYCODONE HYDROCHLORIDE 10 MG: 5 TABLET ORAL at 20:03

## 2025-07-12 RX ADMIN — ACETAMINOPHEN 650 MG: 325 TABLET ORAL at 10:53

## 2025-07-12 RX ADMIN — CYCLOBENZAPRINE 10 MG: 10 TABLET, FILM COATED ORAL at 00:47

## 2025-07-12 RX ADMIN — Medication 1 TABLET: at 08:50

## 2025-07-12 RX ADMIN — PANTOPRAZOLE SODIUM 20 MG: 20 TABLET, DELAYED RELEASE ORAL at 08:51

## 2025-07-12 RX ADMIN — CYCLOBENZAPRINE 10 MG: 10 TABLET, FILM COATED ORAL at 20:03

## 2025-07-12 RX ADMIN — FLECAINIDE ACETATE 100 MG: 100 TABLET ORAL at 08:50

## 2025-07-12 RX ADMIN — THIAMINE HCL TAB 100 MG 100 MG: 100 TAB at 08:50

## 2025-07-12 RX ADMIN — OXYCODONE HYDROCHLORIDE 10 MG: 5 TABLET ORAL at 15:03

## 2025-07-12 RX ADMIN — HEPARIN SODIUM 5000 UNITS: 5000 INJECTION INTRAVENOUS; SUBCUTANEOUS at 14:34

## 2025-07-12 RX ADMIN — LIDOCAINE 4% 1 PATCH: 40 PATCH TOPICAL at 08:51

## 2025-07-12 RX ADMIN — OXYCODONE HYDROCHLORIDE 10 MG: 5 TABLET ORAL at 10:52

## 2025-07-12 ASSESSMENT — PAIN SCALES - GENERAL
PAINLEVEL_OUTOF10: 5 - MODERATE PAIN
PAINLEVEL_OUTOF10: 5 - MODERATE PAIN
PAINLEVEL_OUTOF10: 7
PAINLEVEL_OUTOF10: 9
PAINLEVEL_OUTOF10: 0 - NO PAIN
PAINLEVEL_OUTOF10: 5 - MODERATE PAIN
PAINLEVEL_OUTOF10: 7
PAINLEVEL_OUTOF10: 8
PAINLEVEL_OUTOF10: 4

## 2025-07-12 ASSESSMENT — PAIN - FUNCTIONAL ASSESSMENT
PAIN_FUNCTIONAL_ASSESSMENT: 0-10

## 2025-07-12 ASSESSMENT — COGNITIVE AND FUNCTIONAL STATUS - GENERAL
STANDING UP FROM CHAIR USING ARMS: A LITTLE
MOBILITY SCORE: 17
MOVING FROM LYING ON BACK TO SITTING ON SIDE OF FLAT BED WITH BEDRAILS: A LITTLE
CLIMB 3 TO 5 STEPS WITH RAILING: A LOT
MOVING TO AND FROM BED TO CHAIR: A LITTLE
WALKING IN HOSPITAL ROOM: A LITTLE
TURNING FROM BACK TO SIDE WHILE IN FLAT BAD: A LITTLE

## 2025-07-12 ASSESSMENT — PAIN DESCRIPTION - DESCRIPTORS: DESCRIPTORS: ACHING

## 2025-07-12 NOTE — PROGRESS NOTES
Subjective  No acute events overnight    Neurosurgery Attending  DD  DOS: 7/9      Examination  LLE HF4 (pain limited), DF 4  Otherwise intact      Vitals:    07/12/25 0300   BP:    Pulse: 74   Resp: 17   Temp:    SpO2: 97%         Assessment  H/o HTN, Afib (not on AC), LEATHA, OA, BPH, depression, prior L3-5 fusion ([facility] 10/2022), p/w recurrent back pain and radiculopathy, 7/9 s/p revision/extension T10-pelvis fusion, multilevel decompression and TLIFs (EBL: 2.1L), 7/10 hgb 7.4 s/p 1u pRBC, hgb 7.7 s/p 2U pRBC, 7/11 L DF weakness, CT LS hardware in posn, 7/12 L drain autodc'd      Plan  Tele  Maintain drain/prevena  Will plan on trending CBC, needs AM labs  EOS when able  PTOT-OP (scripts in chart)      Neurosurgery Medications  DEX: none  ABX: none  AED: none  DVT: SQH      Other Meds  (Hygroton, losartan)   NS 100cc/hr

## 2025-07-12 NOTE — CARE PLAN
The patient's goals for the shift include      The clinical goals for the shift include Patient will remain safe and neurologically stabled throughtout the shift.      Problem: Pain - Adult  Goal: Verbalizes/displays adequate comfort level or baseline comfort level  Outcome: Progressing     Problem: Safety - Adult  Goal: Free from fall injury  Outcome: Progressing     Problem: Discharge Planning  Goal: Discharge to home or other facility with appropriate resources  Outcome: Progressing     Problem: Chronic Conditions and Co-morbidities  Goal: Patient's chronic conditions and co-morbidity symptoms are monitored and maintained or improved  Outcome: Progressing     Problem: Nutrition  Goal: Nutrient intake appropriate for maintaining nutritional needs  Outcome: Progressing     Problem: Skin  Goal: Decreased wound size/increased tissue granulation at next dressing change  Outcome: Progressing  Goal: Participates in plan/prevention/treatment measures  Outcome: Progressing  Goal: Prevent/manage excess moisture  Outcome: Progressing  Goal: Prevent/minimize sheer/friction injuries  Outcome: Progressing  Goal: Promote/optimize nutrition  Outcome: Progressing  Goal: Promote skin healing  Outcome: Progressing     Problem: Fall/Injury  Goal: Not fall by end of shift  Outcome: Progressing  Goal: Be free from injury by end of the shift  Outcome: Progressing  Goal: Verbalize understanding of personal risk factors for fall in the hospital  Outcome: Progressing  Goal: Verbalize understanding of risk factor reduction measures to prevent injury from fall in the home  Outcome: Progressing  Goal: Use assistive devices by end of the shift  Outcome: Progressing  Goal: Pace activities to prevent fatigue by end of the shift  Outcome: Progressing     Problem: Pain  Goal: Takes deep breaths with improved pain control throughout the shift  Outcome: Progressing  Goal: Turns in bed with improved pain control throughout the shift  Outcome:  Progressing  Goal: Walks with improved pain control throughout the shift  Outcome: Progressing  Goal: Performs ADL's with improved pain control throughout shift  Outcome: Progressing  Goal: Participates in PT with improved pain control throughout the shift  Outcome: Progressing  Goal: Free from opioid side effects throughout the shift  Outcome: Progressing  Goal: Free from acute confusion related to pain meds throughout the shift  Outcome: Progressing

## 2025-07-12 NOTE — PROGRESS NOTES
Physical Therapy    Physical Therapy Treatment    Patient Name: Kvng June  MRN: [id number]  Department: [facility]  Room: 10/10-A  Today's Date: 7/12/2025  Time Calculation  Start Time: 1344  Stop Time: 1423  Time Calculation (min): 39 min         Assessment/Plan   PT Assessment  PT Assessment Results: Decreased strength, Impaired balance, Decreased mobility  End of Session Communication: Bedside nurse  Assessment Comment: Pt progressed ambulation to 150' using FWW with CGA, pt demos heavy reliance on FWW. Pt completed STS transfers from bed and toilet using FWW with CGA. Pt remains appropriate for low intensity therapy upon dc.  End of Session Patient Position: Up in chair, Alarm off, not on at start of session     PT Plan  Treatment/Interventions: Bed mobility, Transfer training, Gait training, Stair training, Balance training, Neuromuscular re-education, Strengthening, Endurance training, Therapeutic exercise, Therapeutic activity, Home exercise program, Postural re-education  PT Plan: Ongoing PT  PT Frequency: Daily  PT Discharge Recommendations: Low intensity level of continued care  Equipment Recommended upon Discharge: Other (comment) (No device needs)  PT Recommended Transfer Status: Assist x1, Assistive device  PT - OK to Discharge: Yes    PT Visit Info:  PT Received On: 07/12/25     General Visit Information:   General  Prior to Session Communication: Bedside nurse  Patient Position Received: Bed, 3 rail up, Alarm off, not on at start of session  General Comment: Pt supine in bed upon arrival    Subjective   Precautions:  Precautions  Hearing/Visual Limitations: WFL with glasses  Medical Precautions: Fall precautions  Post-Surgical Precautions: Spinal precautions (able to recall 3/3)  Precautions Comment: SBP <160     Date/Time Vitals Session Patient Position Pulse Resp SpO2 BP MAP (mmHg)    07/12/25 1344 Pre PT  Lying  85  19  100 %  129/86  99     07/12/25 1350 --  --  --  --  --  129/86  99      07/12/25 1400 --  --  82  9  100 %  --  --     07/12/25 1500 --  --  85  19  98 %  --  --           Vital Signs Comment:  during ambulation     Objective   Pain:  Pain Assessment  Pain Assessment: 0-10  0-10 (Numeric) Pain Score: 5 - Moderate pain  Pain Type: Surgical pain  Pain Location: Back  Pain Orientation: Mid  Pain Descriptors: Aching  Pain Frequency: Constant/continuous  Pain Onset: Ongoing  Cognition:  Cognition  Overall Cognitive Status: Within Functional Limits    Activity Tolerance:  Activity Tolerance  Endurance: Endurance does not limit participation in activity  Treatments:  Therapeutic Activity  Therapeutic Activity Performed: Yes  Therapeutic Activity 1: Pt sat EOB for approx. 8 minutes to adjust to positional change, using no UE support, maintaining upright posture with SBA.  Therapeutic Activity 2: Pt performed bed mobility, functional transfers, and gait training.  Therapeutic Activity 3: Vitals monitored throughout treatment    Bed Mobility  Bed Mobility: Yes  Bed Mobility 1  Bed Mobility 1: Supine to sitting  Level of Assistance 1: Close supervision  Bed Mobility Comments 1: via log roll    Ambulation/Gait Training  Ambulation/Gait Training Performed: Yes  Ambulation/Gait Training 1  Surface 1: Level tile  Device 1: Rolling walker  Assistance 1: Contact guard  Quality of Gait 1: Decreased step length, Forward flexed posture, Diminished heel strike (dec speed, heavy reliance on FWW)  Comments/Distance (ft) 1: 150' pt occasionally pushes FWW outside safe proximity to the body and requires cuing to correct.  Transfers  Transfer: Yes  Transfer 1  Transfer From 1: Sit to, Stand to  Transfer to 1: Stand, Sit  Technique 1: Sit to stand, Stand to sit  Transfer Device 1: Walker  Transfer Level of Assistance 1: Contact guard  Transfers 2  Transfer From 2: Sit to, Toilet to  Transfer to 2: Toilet, Stand  Technique 2: Sit to stand, Stand to sit  Transfer Device 2: Walker  Transfer Level of Assistance  2: Contact guard  Trials/Comments 2: slow    Outcome Measures:  [facility] Basic Mobility  Turning from your back to your side while in a flat bed without using bedrails: A little  Moving from lying on your back to sitting on the side of a flat bed without using bedrails: A little  Moving to and from bed to chair (including a wheelchair): A little  Standing up from a chair using your arms (e.g. wheelchair or bedside chair): A little  To walk in hospital room: A little  Climbing 3-5 steps with railing: A lot  Basic Mobility - Total Score: 17    Education Documentation  Mobility Training, taught by Stefany Tyler PTA at 7/12/2025  3:38 PM.  Learner: Patient  Readiness: Acceptance  Method: Explanation  Response: Verbalizes Understanding, Demonstrated Understanding  Comment: FWW management    Education Comments  No comments found.        OP EDUCATION:       Encounter Problems       Encounter Problems (Active)       PT Problem       Patient will perform bed mobility IND to reduce risk of developing decubitus ulcers.  (Progressing)       Start:  07/10/25    Expected End:  07/24/25            Patient will perform sit to stand and stand to sit transfers MOD-I with LRD to increase functional strength.   (Progressing)       Start:  07/10/25    Expected End:  07/24/25            Patient will ambulate at least 250  ft. MOD-I with LRD to improve tolerance of community distances.     (Progressing)       Start:  07/10/25    Expected End:  07/24/25            Patient will ascend and descend >/= 3 steps with railing and </= close sup to facilitate safe navigation of stairs in the home.    (Progressing)       Start:  07/10/25    Expected End:  07/24/25            Patient will score >/= 24/28 points on the Tinetti Performance Oriented Mobility Assessment to indicate low falls risk. (Progressing)       Start:  07/10/25    Expected End:  07/24/25

## 2025-07-13 VITALS
SYSTOLIC BLOOD PRESSURE: 132 MMHG | TEMPERATURE: 93.7 F | BODY MASS INDEX: 33.24 KG/M2 | OXYGEN SATURATION: 95 % | WEIGHT: 224.43 LBS | HEIGHT: 69 IN | DIASTOLIC BLOOD PRESSURE: 85 MMHG | HEART RATE: 98 BPM | RESPIRATION RATE: 17 BRPM

## 2025-07-13 LAB
ALBUMIN SERPL BCP-MCNC: 3.5 G/DL (ref 3.4–5)
ALBUMIN SERPL BCP-MCNC: 3.7 G/DL (ref 3.4–5)
ANION GAP SERPL CALC-SCNC: 10 MMOL/L (ref 10–20)
ANION GAP SERPL CALC-SCNC: 9 MMOL/L
BUN SERPL-MCNC: 11 MG/DL (ref 6–23)
BUN SERPL-MCNC: 11 MG/DL (ref 6–23)
CALCIUM SERPL-MCNC: 8.6 MG/DL (ref 8.6–10.6)
CALCIUM SERPL-MCNC: 8.8 MG/DL (ref 8.6–10.6)
CHLORIDE SERPL-SCNC: 97 MMOL/L (ref 98–107)
CHLORIDE SERPL-SCNC: 97 MMOL/L (ref 98–107)
CO2 SERPL-SCNC: 27 MMOL/L (ref 21–32)
CO2 SERPL-SCNC: 29 MMOL/L (ref 21–32)
CREAT SERPL-MCNC: 1.1 MG/DL (ref 0.5–1.3)
CREAT SERPL-MCNC: 1.16 MG/DL (ref 0.5–1.3)
EGFRCR SERPLBLD CKD-EPI 2021: 72 ML/MIN/1.73M*2
EGFRCR SERPLBLD CKD-EPI 2021: 76 ML/MIN/1.73M*2
ERYTHROCYTE [DISTWIDTH] IN BLOOD BY AUTOMATED COUNT: 17.4 % (ref 11.5–14.5)
ERYTHROCYTE [DISTWIDTH] IN BLOOD BY AUTOMATED COUNT: 17.7 % (ref 11.5–14.5)
GLUCOSE BLD MANUAL STRIP-MCNC: 105 MG/DL (ref 74–99)
GLUCOSE BLD MANUAL STRIP-MCNC: 111 MG/DL (ref 74–99)
GLUCOSE BLD MANUAL STRIP-MCNC: 127 MG/DL (ref 74–99)
GLUCOSE BLD MANUAL STRIP-MCNC: 160 MG/DL (ref 74–99)
GLUCOSE SERPL-MCNC: 129 MG/DL (ref 74–99)
GLUCOSE SERPL-MCNC: 97 MG/DL (ref 74–99)
HCT VFR BLD AUTO: 23.3 % (ref 41–52)
HCT VFR BLD AUTO: 24.3 % (ref 41–52)
HGB BLD-MCNC: 7.9 G/DL (ref 13.5–17.5)
HGB BLD-MCNC: 8 G/DL (ref 13.5–17.5)
MAGNESIUM SERPL-MCNC: 1.86 MG/DL (ref 1.6–2.4)
MCH RBC QN AUTO: 32.3 PG (ref 26–34)
MCH RBC QN AUTO: 32.9 PG (ref 26–34)
MCHC RBC AUTO-ENTMCNC: 32.9 G/DL (ref 32–36)
MCHC RBC AUTO-ENTMCNC: 33.9 G/DL (ref 32–36)
MCV RBC AUTO: 97 FL (ref 80–100)
MCV RBC AUTO: 98 FL (ref 80–100)
NRBC BLD-RTO: 0 /100 WBCS (ref 0–0)
NRBC BLD-RTO: 0 /100 WBCS (ref 0–0)
PHOSPHATE SERPL-MCNC: 2.3 MG/DL (ref 2.5–4.9)
PHOSPHATE SERPL-MCNC: 2.5 MG/DL (ref 2.5–4.9)
PLATELET # BLD AUTO: 133 X10*3/UL (ref 150–450)
PLATELET # BLD AUTO: 152 X10*3/UL (ref 150–450)
POTASSIUM SERPL-SCNC: 3.7 MMOL/L (ref 3.5–5.3)
POTASSIUM SERPL-SCNC: 3.9 MMOL/L (ref 3.5–5.3)
RBC # BLD AUTO: 2.4 X10*6/UL (ref 4.5–5.9)
RBC # BLD AUTO: 2.48 X10*6/UL (ref 4.5–5.9)
SODIUM SERPL-SCNC: 129 MMOL/L (ref 136–145)
SODIUM SERPL-SCNC: 132 MMOL/L (ref 136–145)
WBC # BLD AUTO: 10.9 X10*3/UL (ref 4.4–11.3)
WBC # BLD AUTO: 11.2 X10*3/UL (ref 4.4–11.3)

## 2025-07-13 PROCEDURE — 2500000001 HC RX 250 WO HCPCS SELF ADMINISTERED DRUGS (ALT 637 FOR MEDICARE OP)

## 2025-07-13 PROCEDURE — 80069 RENAL FUNCTION PANEL: CPT

## 2025-07-13 PROCEDURE — 2500000002 HC RX 250 W HCPCS SELF ADMINISTERED DRUGS (ALT 637 FOR MEDICARE OP, ALT 636 FOR OP/ED)

## 2025-07-13 PROCEDURE — 83735 ASSAY OF MAGNESIUM: CPT | Performed by: PHYSICIAN ASSISTANT

## 2025-07-13 PROCEDURE — 2500000004 HC RX 250 GENERAL PHARMACY W/ HCPCS (ALT 636 FOR OP/ED)

## 2025-07-13 PROCEDURE — 97530 THERAPEUTIC ACTIVITIES: CPT | Mod: GP

## 2025-07-13 PROCEDURE — 82947 ASSAY GLUCOSE BLOOD QUANT: CPT

## 2025-07-13 PROCEDURE — 2500000005 HC RX 250 GENERAL PHARMACY W/O HCPCS: Performed by: REGISTERED NURSE

## 2025-07-13 PROCEDURE — 1200000002 HC GENERAL ROOM WITH TELEMETRY DAILY

## 2025-07-13 PROCEDURE — 85027 COMPLETE CBC AUTOMATED: CPT

## 2025-07-13 PROCEDURE — 2500000004 HC RX 250 GENERAL PHARMACY W/ HCPCS (ALT 636 FOR OP/ED): Performed by: PHYSICIAN ASSISTANT

## 2025-07-13 PROCEDURE — 2500000005 HC RX 250 GENERAL PHARMACY W/O HCPCS

## 2025-07-13 PROCEDURE — 2500000001 HC RX 250 WO HCPCS SELF ADMINISTERED DRUGS (ALT 637 FOR MEDICARE OP): Performed by: STUDENT IN AN ORGANIZED HEALTH CARE EDUCATION/TRAINING PROGRAM

## 2025-07-13 PROCEDURE — 2500000004 HC RX 250 GENERAL PHARMACY W/ HCPCS (ALT 636 FOR OP/ED): Performed by: STUDENT IN AN ORGANIZED HEALTH CARE EDUCATION/TRAINING PROGRAM

## 2025-07-13 PROCEDURE — 36415 COLL VENOUS BLD VENIPUNCTURE: CPT

## 2025-07-13 PROCEDURE — 97116 GAIT TRAINING THERAPY: CPT | Mod: GP

## 2025-07-13 PROCEDURE — 2500000001 HC RX 250 WO HCPCS SELF ADMINISTERED DRUGS (ALT 637 FOR MEDICARE OP): Performed by: PHYSICIAN ASSISTANT

## 2025-07-13 PROCEDURE — 85027 COMPLETE CBC AUTOMATED: CPT | Performed by: PHYSICIAN ASSISTANT

## 2025-07-13 PROCEDURE — 2500000001 HC RX 250 WO HCPCS SELF ADMINISTERED DRUGS (ALT 637 FOR MEDICARE OP): Performed by: REGISTERED NURSE

## 2025-07-13 PROCEDURE — 80069 RENAL FUNCTION PANEL: CPT | Performed by: PHYSICIAN ASSISTANT

## 2025-07-13 PROCEDURE — 36415 COLL VENOUS BLD VENIPUNCTURE: CPT | Performed by: PHYSICIAN ASSISTANT

## 2025-07-13 RX ADMIN — FLECAINIDE ACETATE 100 MG: 100 TABLET ORAL at 08:51

## 2025-07-13 RX ADMIN — HEPARIN SODIUM 5000 UNITS: 5000 INJECTION INTRAVENOUS; SUBCUTANEOUS at 14:42

## 2025-07-13 RX ADMIN — SENNOSIDES AND DOCUSATE SODIUM 2 TABLET: 50; 8.6 TABLET ORAL at 08:51

## 2025-07-13 RX ADMIN — LIDOCAINE 4% 1 PATCH: 40 PATCH TOPICAL at 08:51

## 2025-07-13 RX ADMIN — OXYCODONE HYDROCHLORIDE 10 MG: 5 TABLET ORAL at 05:17

## 2025-07-13 RX ADMIN — POLYETHYLENE GLYCOL 3350 17 G: 17 POWDER, FOR SOLUTION ORAL at 08:50

## 2025-07-13 RX ADMIN — SENNOSIDES AND DOCUSATE SODIUM 2 TABLET: 50; 8.6 TABLET ORAL at 20:39

## 2025-07-13 RX ADMIN — FOLIC ACID 1 MG: 1 TABLET ORAL at 08:51

## 2025-07-13 RX ADMIN — THIAMINE HCL TAB 100 MG 100 MG: 100 TAB at 08:51

## 2025-07-13 RX ADMIN — CYCLOBENZAPRINE 10 MG: 10 TABLET, FILM COATED ORAL at 14:47

## 2025-07-13 RX ADMIN — SODIUM CHLORIDE 500 ML: 0.9 INJECTION, SOLUTION INTRAVENOUS at 20:44

## 2025-07-13 RX ADMIN — HEPARIN SODIUM 5000 UNITS: 5000 INJECTION INTRAVENOUS; SUBCUTANEOUS at 22:11

## 2025-07-13 RX ADMIN — FLECAINIDE ACETATE 100 MG: 100 TABLET ORAL at 22:11

## 2025-07-13 RX ADMIN — METHOCARBAMOL 1000 MG: 500 TABLET ORAL at 01:19

## 2025-07-13 RX ADMIN — METHOCARBAMOL 1000 MG: 500 TABLET ORAL at 17:35

## 2025-07-13 RX ADMIN — METOPROLOL TARTRATE 25 MG: 25 TABLET, FILM COATED ORAL at 20:41

## 2025-07-13 RX ADMIN — ACETAMINOPHEN 650 MG: 325 TABLET ORAL at 17:35

## 2025-07-13 RX ADMIN — PANTOPRAZOLE SODIUM 20 MG: 20 TABLET, DELAYED RELEASE ORAL at 08:51

## 2025-07-13 RX ADMIN — ACETAMINOPHEN 650 MG: 325 TABLET ORAL at 05:17

## 2025-07-13 RX ADMIN — BUPROPION HYDROCHLORIDE 150 MG: 150 TABLET, EXTENDED RELEASE ORAL at 08:51

## 2025-07-13 RX ADMIN — ACETAMINOPHEN 650 MG: 325 TABLET ORAL at 09:50

## 2025-07-13 RX ADMIN — OXYCODONE HYDROCHLORIDE 10 MG: 5 TABLET ORAL at 23:31

## 2025-07-13 RX ADMIN — ACETAMINOPHEN 650 MG: 325 TABLET ORAL at 22:10

## 2025-07-13 RX ADMIN — HEPARIN SODIUM 5000 UNITS: 5000 INJECTION INTRAVENOUS; SUBCUTANEOUS at 05:17

## 2025-07-13 RX ADMIN — Medication 1 TABLET: at 08:51

## 2025-07-13 RX ADMIN — METOPROLOL TARTRATE 25 MG: 25 TABLET, FILM COATED ORAL at 08:51

## 2025-07-13 RX ADMIN — INSULIN LISPRO 1 UNITS: 100 INJECTION, SOLUTION INTRAVENOUS; SUBCUTANEOUS at 13:03

## 2025-07-13 RX ADMIN — OXYCODONE HYDROCHLORIDE 10 MG: 5 TABLET ORAL at 01:17

## 2025-07-13 RX ADMIN — OXYCODONE HYDROCHLORIDE 10 MG: 5 TABLET ORAL at 14:47

## 2025-07-13 RX ADMIN — OXYCODONE HYDROCHLORIDE 10 MG: 5 TABLET ORAL at 19:20

## 2025-07-13 RX ADMIN — OXYCODONE HYDROCHLORIDE 10 MG: 5 TABLET ORAL at 09:50

## 2025-07-13 RX ADMIN — PREDNISOLONE ACETATE 1 DROP: 10 SUSPENSION/ DROPS OPHTHALMIC at 21:04

## 2025-07-13 RX ADMIN — PREDNISOLONE ACETATE 1 DROP: 10 SUSPENSION/ DROPS OPHTHALMIC at 12:38

## 2025-07-13 RX ADMIN — CYCLOBENZAPRINE 10 MG: 10 TABLET, FILM COATED ORAL at 05:17

## 2025-07-13 ASSESSMENT — LIFESTYLE VARIABLES
NAUSEA AND VOMITING: NO NAUSEA AND NO VOMITING
VISUAL DISTURBANCES: NOT PRESENT
TOTAL SCORE: 15
HEADACHE, FULLNESS IN HEAD: VERY MILD
ANXIETY: 3
NAUSEA AND VOMITING: MILD NAUSEA WITH NO VOMITING
ANXIETY: MILDLY ANXIOUS
AGITATION: SOMEWHAT MORE THAN NORMAL ACTIVITY
ORIENTATION AND CLOUDING OF SENSORIUM: ORIENTED AND CAN DO SERIAL ADDITIONS
HEADACHE, FULLNESS IN HEAD: MILD
TREMOR: 6
AUDITORY DISTURBANCES: VERY MILD HARSHNESS OR ABILITY TO FRIGHTEN
VISUAL DISTURBANCES: NOT PRESENT
TACTILE DISTURBANCES: MODERATE ITCHING, PINS AND NEEDLES, BURNING OR NUMBNESS
AUDITORY DISTURBANCES: NOT PRESENT
ORIENTATION AND CLOUDING OF SENSORIUM: ORIENTED AND CAN DO SERIAL ADDITIONS
TREMOR: 3
TOTAL SCORE: 15
PAROXYSMAL SWEATS: 2
PAROXYSMAL SWEATS: 2
TACTILE DISTURBANCES: MODERATE ITCHING, PINS AND NEEDLES, BURNING OR NUMBNESS
AGITATION: SOMEWHAT MORE THAN NORMAL ACTIVITY

## 2025-07-13 ASSESSMENT — PAIN SCALES - GENERAL
PAINLEVEL_OUTOF10: 8
PAINLEVEL_OUTOF10: 9
PAINLEVEL_OUTOF10: 4
PAINLEVEL_OUTOF10: 6
PAINLEVEL_OUTOF10: 7
PAINLEVEL_OUTOF10: 6
PAINLEVEL_OUTOF10: 7

## 2025-07-13 ASSESSMENT — PAIN - FUNCTIONAL ASSESSMENT
PAIN_FUNCTIONAL_ASSESSMENT: 0-10

## 2025-07-13 ASSESSMENT — COGNITIVE AND FUNCTIONAL STATUS - GENERAL
MOVING TO AND FROM BED TO CHAIR: A LITTLE
MOBILITY SCORE: 18
STANDING UP FROM CHAIR USING ARMS: A LITTLE
MOVING FROM LYING ON BACK TO SITTING ON SIDE OF FLAT BED WITH BEDRAILS: A LITTLE
WALKING IN HOSPITAL ROOM: A LITTLE
TURNING FROM BACK TO SIDE WHILE IN FLAT BAD: A LITTLE
CLIMB 3 TO 5 STEPS WITH RAILING: A LITTLE

## 2025-07-13 NOTE — PROGRESS NOTES
Physical Therapy    Physical Therapy Treatment    Patient Name: Kvng June  MRN: [id number]  Department: [place] [address]  Room: [address] [place] [address]  Today's Date: 7/13/2025  Time Calculation  Start Time: 1404  Stop Time: 1433  Time Calculation (min): 29 min         Assessment/Plan   PT Assessment  End of Session Communication: Bedside nurse  Assessment Comment: Pt able to negotiate steps this date with SBA and x1 UE support. Pt conitnues to be appropriate for low intensity therapy when medically stable for DC.  End of Session Patient Position: Up in chair, Alarm off, not on at start of session     PT Plan  Treatment/Interventions: Bed mobility, Transfer training, Gait training, Stair training, Balance training, Neuromuscular re-education, Strengthening, Endurance training, Therapeutic exercise, Therapeutic activity, Home exercise program, Postural re-education  PT Plan: Ongoing PT  PT Frequency: Daily  PT Discharge Recommendations: Low intensity level of continued care  Equipment Recommended upon Discharge: Other (comment) (No device needs)  PT Recommended Transfer Status: Assist x1, Assistive device  PT - OK to Discharge: Yes    PT Visit Info:  PT Received On: 07/13/25  Response to Previous Treatment: Patient with no complaints from previous session.     General Visit Information:   General  Patient Position Received: Bed, 3 rail up, Alarm off, not on at start of session  General Comment: Pt supine in bed upon PT arrival, on room air, tele and wound vac. Pt agreeable to PT treatment    Subjective   Precautions:  Precautions  Medical Precautions: Fall precautions  Post-Surgical Precautions: Spinal precautions  Precautions Comment: SBP <160    Vital Signs Comment: supine /93, HR 75, Sp 94%, RR 21; VSS during session and at end of session in chair HR 80, SpO2 100% and RR 16     Objective   Pain:  Pain Assessment  Pain Assessment: 0-10  0-10 (Numeric) Pain Score: 4 (got up to a 7 ambulating but return to 4 at end of  session)  Pain Type: Surgical pain  Pain Location: Back  Cognition:  Cognition  Overall Cognitive Status: Within Functional Limits  Orientation Level: Oriented X4    Treatments:    Bed Mobility  Bed Mobility: Yes  Bed Mobility 1  Bed Mobility 1: Supine to sitting  Level of Assistance 1: Close supervision  Bed Mobility Comments 1: cues for sequencing, HOB elevated    Ambulation/Gait Training  Ambulation/Gait Training Performed: Yes  Ambulation/Gait Training 1  Surface 1: Level tile  Device 1: Rolling walker  Assistance 1: Close supervision  Quality of Gait 1: Decreased step length, Forward flexed posture, Diminished heel strike  Comments/Distance (ft) 1: x200ft; cues for upright posture and for pt to maintain BUE on FWW  Transfers  Transfer: Yes  Transfer 1  Transfer From 1: Sit to, Stand to  Transfer to 1: Sit, Stand  Technique 1: Sit to stand, Stand to sit  Transfer Device 1: Walker  Transfer Level of Assistance 1: Close supervision  Trials/Comments 1: cues for sequencing with FWW    Stairs  Stairs: Yes  Stairs  Rails 1: Left  Curb Step 1: No  Device 1: Railing  Assistance 1: Close supervision  Comment/Number of Steps 1: x7 steps ascend/descend with cues for upright posture and sequencing    Outcome Measures:  [facility] Basic Mobility  Turning from your back to your side while in a flat bed without using bedrails: A little  Moving from lying on your back to sitting on the side of a flat bed without using bedrails: A little  Moving to and from bed to chair (including a wheelchair): A little  Standing up from a chair using your arms (e.g. wheelchair or bedside chair): A little  To walk in hospital room: A little  Climbing 3-5 steps with railing: A little  Basic Mobility - Total Score: 18    FSS-ICU  Ambulation: Walks >/ or equal to 150 feet with supervision  Rolling: Modified independence, requires use of assistive device  Sitting: Supervision or set-up only  Transfer Sit-to-Stand: Supervision or set-up only  Transfer  Supine-to-Sit: Supervision or set-up only  Total Score: 26    Education Documentation  Precautions, taught by Isabell Powell PT at 7/13/2025  4:49 PM.  Learner: Patient  Readiness: Acceptance  Method: Explanation  Response: Verbalizes Understanding    Body Mechanics, taught by Isabell Powell PT at 7/13/2025  4:49 PM.  Learner: Patient  Readiness: Acceptance  Method: Explanation  Response: Verbalizes Understanding    Mobility Training, taught by Isabell Powell PT at 7/13/2025  4:49 PM.  Learner: Patient  Readiness: Acceptance  Method: Explanation  Response: Verbalizes Understanding    Education Comments  No comments found.        OP EDUCATION:       Encounter Problems       Encounter Problems (Active)       PT Problem       Patient will perform bed mobility IND to reduce risk of developing decubitus ulcers.  (Progressing)       Start:  07/10/25    Expected End:  07/24/25            Patient will perform sit to stand and stand to sit transfers MOD-I with LRD to increase functional strength.   (Progressing)       Start:  07/10/25    Expected End:  07/24/25            Patient will ambulate at least 250  ft. MOD-I with LRD to improve tolerance of community distances.     (Progressing)       Start:  07/10/25    Expected End:  07/24/25            Patient will ascend and descend >/= 3 steps with railing and </= close sup to facilitate safe navigation of stairs in the home.    (Met)       Start:  07/10/25    Expected End:  07/24/25    Resolved:  07/13/25         Patient will score >/= 24/28 points on the Tinetti Performance Oriented Mobility Assessment to indicate low falls risk. (Progressing)       Start:  07/10/25    Expected End:  07/24/25                   Isabell Powell PT, DPT

## 2025-07-13 NOTE — PROGRESS NOTES
"Kvng June is a 60 y.o. male on day 4 of admission presenting with Degenerative scoliosis in adult patient.    Subjective   Reports improvement in tremors, numbness in toes and leg that was present for months. He had a small BM.    Objective     Physical Exam  AOx3  RUE D5 B5 T5 HG/IO 5  RLE HF5 KE5 DF/PF 5  LUE D5 B5 T5 HG/IO 5  LLE HF5 KE5 DF4 PF 5 EHL 4  Numbness on L toes  Prevena holding suction    Last Recorded Vitals  Blood pressure (!) 140/92, pulse 75, temperature 36.7 °C (98.1 °F), temperature source Temporal, resp. rate 17, height 1.753 m (5' 9\"), weight 102 kg (224 lb 6.9 oz), SpO2 94%.  Intake/Output last 3 Shifts:  I/O last 3 completed shifts:  In: 500 (4.9 mL/kg) [P.O.:250; IV Piggyback:250]  Out: 2650 (26 mL/kg) [Urine:2500 (0.7 mL/kg/hr); Drains:150]  Weight: 101.8 kg     Relevant Results  Lab Results   Component Value Date    WBC 11.2 07/13/2025    HGB 7.9 (L) 07/13/2025    HCT 23.3 (L) 07/13/2025    MCV 97 07/13/2025     (L) 07/13/2025       Assessment & Plan  Degenerative scoliosis in adult patient    Epidural lipomatosis    Lumbar radiculopathy    Chronic renal impairment, stage 3a (Multi)    Lumbar disc disease    Osteoarthritis    Pseudoarthrosis of lumbar spine    Degenerative lumbar spinal stenosis    Kvng June is 60 y.o. w/ h/o HTN, Afib (not on AC), LEATHA, OA, BPH, depression, prior L3-5 fusion ([facility] 10/2022), p/w recurrent back pain and radiculopathy, 7/9 s/p revision/extension T10-pelvis fusion, multilevel decompression and TLIFs (EBL: 2.1L), 7/10 hgb 7.4 s/p 1u pRBC, hgb 7.7 s/p 2U pRBC, 7/11 L DF weakness, CT LS hardware in posn, 7/12 drains autodc'd    Tele  Continue prevena  Q12 labs (likely relax in AM)  Obtain EOS when able  PTOT OP  DVT ppx, SCDs  Poss dispo in PM           Hudson Prabhakar MD    "

## 2025-07-14 ENCOUNTER — APPOINTMENT (OUTPATIENT)
Dept: RADIOLOGY | Facility: HOSPITAL | Age: 60
DRG: 427 | End: 2025-07-14
Payer: COMMERCIAL

## 2025-07-14 VITALS
SYSTOLIC BLOOD PRESSURE: 125 MMHG | DIASTOLIC BLOOD PRESSURE: 83 MMHG | HEART RATE: 77 BPM | BODY MASS INDEX: 33.24 KG/M2 | TEMPERATURE: 99 F | OXYGEN SATURATION: 95 % | WEIGHT: 224.43 LBS | RESPIRATION RATE: 15 BRPM | HEIGHT: 69 IN

## 2025-07-14 LAB
ALBUMIN SERPL BCP-MCNC: 3.4 G/DL (ref 3.4–5)
ANION GAP SERPL CALC-SCNC: 11 MMOL/L (ref 10–20)
BUN SERPL-MCNC: 11 MG/DL (ref 6–23)
CALCIUM SERPL-MCNC: 8.9 MG/DL (ref 8.6–10.6)
CHLORIDE SERPL-SCNC: 97 MMOL/L (ref 98–107)
CO2 SERPL-SCNC: 28 MMOL/L (ref 21–32)
CREAT SERPL-MCNC: 1.07 MG/DL (ref 0.5–1.3)
EGFRCR SERPLBLD CKD-EPI 2021: 79 ML/MIN/1.73M*2
ERYTHROCYTE [DISTWIDTH] IN BLOOD BY AUTOMATED COUNT: 17.2 % (ref 11.5–14.5)
GLUCOSE BLD MANUAL STRIP-MCNC: 112 MG/DL (ref 74–99)
GLUCOSE BLD MANUAL STRIP-MCNC: 147 MG/DL (ref 74–99)
GLUCOSE SERPL-MCNC: 118 MG/DL (ref 74–99)
HCT VFR BLD AUTO: 26.1 % (ref 41–52)
HGB BLD-MCNC: 8.1 G/DL (ref 13.5–17.5)
MAGNESIUM SERPL-MCNC: 2.11 MG/DL (ref 1.6–2.4)
MCH RBC QN AUTO: 31.3 PG (ref 26–34)
MCHC RBC AUTO-ENTMCNC: 31 G/DL (ref 32–36)
MCV RBC AUTO: 101 FL (ref 80–100)
NRBC BLD-RTO: 0 /100 WBCS (ref 0–0)
PHOSPHATE SERPL-MCNC: 3.3 MG/DL (ref 2.5–4.9)
PLATELET # BLD AUTO: 190 X10*3/UL (ref 150–450)
POTASSIUM SERPL-SCNC: 4 MMOL/L (ref 3.5–5.3)
RBC # BLD AUTO: 2.59 X10*6/UL (ref 4.5–5.9)
SODIUM SERPL-SCNC: 132 MMOL/L (ref 136–145)
WBC # BLD AUTO: 10.8 X10*3/UL (ref 4.4–11.3)

## 2025-07-14 PROCEDURE — 2500000001 HC RX 250 WO HCPCS SELF ADMINISTERED DRUGS (ALT 637 FOR MEDICARE OP): Performed by: PHYSICIAN ASSISTANT

## 2025-07-14 PROCEDURE — 85027 COMPLETE CBC AUTOMATED: CPT

## 2025-07-14 PROCEDURE — 72082 X-RAY EXAM ENTIRE SPI 2/3 VW: CPT | Performed by: RADIOLOGY

## 2025-07-14 PROCEDURE — 83735 ASSAY OF MAGNESIUM: CPT

## 2025-07-14 PROCEDURE — 82947 ASSAY GLUCOSE BLOOD QUANT: CPT

## 2025-07-14 PROCEDURE — 36415 COLL VENOUS BLD VENIPUNCTURE: CPT

## 2025-07-14 PROCEDURE — 97116 GAIT TRAINING THERAPY: CPT | Mod: GP,CQ

## 2025-07-14 PROCEDURE — 2500000001 HC RX 250 WO HCPCS SELF ADMINISTERED DRUGS (ALT 637 FOR MEDICARE OP): Performed by: REGISTERED NURSE

## 2025-07-14 PROCEDURE — 2500000001 HC RX 250 WO HCPCS SELF ADMINISTERED DRUGS (ALT 637 FOR MEDICARE OP)

## 2025-07-14 PROCEDURE — 72082 X-RAY EXAM ENTIRE SPI 2/3 VW: CPT

## 2025-07-14 PROCEDURE — 2500000002 HC RX 250 W HCPCS SELF ADMINISTERED DRUGS (ALT 637 FOR MEDICARE OP, ALT 636 FOR OP/ED)

## 2025-07-14 PROCEDURE — 80069 RENAL FUNCTION PANEL: CPT

## 2025-07-14 PROCEDURE — 97110 THERAPEUTIC EXERCISES: CPT | Mod: GP,CQ

## 2025-07-14 PROCEDURE — 2500000004 HC RX 250 GENERAL PHARMACY W/ HCPCS (ALT 636 FOR OP/ED)

## 2025-07-14 RX ORDER — ACETAMINOPHEN 325 MG/1
650 TABLET ORAL EVERY 6 HOURS
Qty: 56 TABLET | Refills: 0 | Status: SHIPPED | OUTPATIENT
Start: 2025-07-14 | End: 2025-07-21

## 2025-07-14 RX ORDER — CYCLOBENZAPRINE HCL 10 MG
10 TABLET ORAL 3 TIMES DAILY
Qty: 21 TABLET | Refills: 0 | Status: SHIPPED | OUTPATIENT
Start: 2025-07-14 | End: 2025-07-25 | Stop reason: SDUPTHER

## 2025-07-14 RX ORDER — CHLORTHALIDONE 25 MG/1
25 TABLET ORAL DAILY
Status: DISCONTINUED | OUTPATIENT
Start: 2025-07-14 | End: 2025-07-14 | Stop reason: HOSPADM

## 2025-07-14 RX ORDER — OXYCODONE HYDROCHLORIDE 5 MG/1
5 TABLET ORAL EVERY 6 HOURS PRN
Qty: 28 TABLET | Refills: 0 | Status: SHIPPED | OUTPATIENT
Start: 2025-07-14 | End: 2025-07-21 | Stop reason: SDUPTHER

## 2025-07-14 RX ORDER — POLYETHYLENE GLYCOL 3350 17 G/17G
17 POWDER, FOR SOLUTION ORAL 3 TIMES DAILY
Qty: 21 PACKET | Refills: 0 | Status: SHIPPED | OUTPATIENT
Start: 2025-07-14 | End: 2025-07-21

## 2025-07-14 RX ORDER — NAPROXEN 500 MG/1
500 TABLET ORAL
Start: 2025-07-14

## 2025-07-14 RX ORDER — LOSARTAN POTASSIUM 100 MG/1
100 TABLET ORAL DAILY
Status: DISCONTINUED | OUTPATIENT
Start: 2025-07-14 | End: 2025-07-14 | Stop reason: HOSPADM

## 2025-07-14 RX ORDER — AMOXICILLIN 250 MG
2 CAPSULE ORAL 2 TIMES DAILY
Qty: 28 TABLET | Refills: 0 | Status: SHIPPED | OUTPATIENT
Start: 2025-07-14 | End: 2025-07-21

## 2025-07-14 RX ADMIN — LOSARTAN POTASSIUM 100 MG: 100 TABLET, FILM COATED ORAL at 12:27

## 2025-07-14 RX ADMIN — PREDNISOLONE ACETATE 1 DROP: 10 SUSPENSION/ DROPS OPHTHALMIC at 12:27

## 2025-07-14 RX ADMIN — ACETAMINOPHEN 650 MG: 325 TABLET ORAL at 10:17

## 2025-07-14 RX ADMIN — FLECAINIDE ACETATE 100 MG: 100 TABLET ORAL at 09:05

## 2025-07-14 RX ADMIN — PREDNISOLONE ACETATE 1 DROP: 10 SUSPENSION/ DROPS OPHTHALMIC at 06:33

## 2025-07-14 RX ADMIN — SENNOSIDES AND DOCUSATE SODIUM 2 TABLET: 50; 8.6 TABLET ORAL at 09:05

## 2025-07-14 RX ADMIN — THIAMINE HCL TAB 100 MG 100 MG: 100 TAB at 09:05

## 2025-07-14 RX ADMIN — BUPROPION HYDROCHLORIDE 150 MG: 150 TABLET, EXTENDED RELEASE ORAL at 09:05

## 2025-07-14 RX ADMIN — PANTOPRAZOLE SODIUM 20 MG: 20 TABLET, DELAYED RELEASE ORAL at 09:05

## 2025-07-14 RX ADMIN — METOPROLOL TARTRATE 25 MG: 25 TABLET, FILM COATED ORAL at 09:05

## 2025-07-14 RX ADMIN — CHLORTHALIDONE 25 MG: 25 TABLET ORAL at 12:27

## 2025-07-14 RX ADMIN — FOLIC ACID 1 MG: 1 TABLET ORAL at 09:05

## 2025-07-14 RX ADMIN — OXYCODONE HYDROCHLORIDE 10 MG: 5 TABLET ORAL at 03:51

## 2025-07-14 RX ADMIN — ACETAMINOPHEN 650 MG: 325 TABLET ORAL at 03:51

## 2025-07-14 RX ADMIN — Medication 1 TABLET: at 09:05

## 2025-07-14 RX ADMIN — OXYCODONE HYDROCHLORIDE 10 MG: 5 TABLET ORAL at 09:05

## 2025-07-14 RX ADMIN — CYCLOBENZAPRINE 10 MG: 10 TABLET, FILM COATED ORAL at 09:05

## 2025-07-14 RX ADMIN — HEPARIN SODIUM 5000 UNITS: 5000 INJECTION INTRAVENOUS; SUBCUTANEOUS at 06:33

## 2025-07-14 ASSESSMENT — PAIN - FUNCTIONAL ASSESSMENT
PAIN_FUNCTIONAL_ASSESSMENT: 0-10

## 2025-07-14 ASSESSMENT — COGNITIVE AND FUNCTIONAL STATUS - GENERAL
CLIMB 3 TO 5 STEPS WITH RAILING: A LITTLE
TURNING FROM BACK TO SIDE WHILE IN FLAT BAD: A LITTLE
STANDING UP FROM CHAIR USING ARMS: A LITTLE
MOVING TO AND FROM BED TO CHAIR: A LITTLE
WALKING IN HOSPITAL ROOM: A LITTLE
MOVING FROM LYING ON BACK TO SITTING ON SIDE OF FLAT BED WITH BEDRAILS: A LITTLE
MOBILITY SCORE: 18

## 2025-07-14 ASSESSMENT — PAIN SCALES - GENERAL
PAINLEVEL_OUTOF10: 6
PAINLEVEL_OUTOF10: 6
PAINLEVEL_OUTOF10: 5 - MODERATE PAIN
PAINLEVEL_OUTOF10: 7
PAINLEVEL_OUTOF10: 8

## 2025-07-14 NOTE — PROGRESS NOTES
Physical Therapy                 Therapy Communication Note    Patient Name: vKng June  MRN: [id number]  Department: [place] [address]  Room: [facility]/6017  Today's Date: 7/14/2025     Discipline: Physical Therapy    Missed Visit: PT Missed Visit: Yes     Missed Visit Reason: Missed Visit Reason:  (Attempted follow up however pt. on call for an x-ray per RN.)    Missed Time: Attempt    Comment:

## 2025-07-14 NOTE — PROGRESS NOTES
"Physical Therapy    Physical Therapy Treatment    Patient Name: Kvng June  MRN: [id number]  Department: [place] [address]  Room: [address] [place] [address]  Today's Date: 7/14/2025  Time Calculation  Start Time: 1051  Stop Time: 1126  Time Calculation (min): 35 min         Assessment/Plan   PT Assessment  PT Assessment Results: Decreased strength, Impaired balance, Decreased mobility  Rehab Prognosis: Excellent  Barriers to Discharge Home: No anticipated barriers  Evaluation/Treatment Tolerance: Patient tolerated treatment well  End of Session Communication: Bedside nurse  Assessment Comment: Pt. tolerated session well Pt. looking fwd to going home. Pt. states no increased pain during session. Offered to set pt. up with chair as it is part of the session however pt. declined stating \"i'm leaving today and i want to take a nap\" Pt. reports not needing home therapy despite low intensive rec post acute. (Pt. states \"i already sat up in a chair at the other place\")  End of Session Patient Position: Bed, 2 rail up  PT Plan  Inpatient/Swing Bed or Outpatient: Inpatient  PT Plan  Treatment/Interventions: Bed mobility, Transfer training, Gait training, Stair training, Therapeutic activity, Therapeutic exercise  PT Plan: Ongoing PT  PT Frequency: Daily  PT Discharge Recommendations: Low intensity level of continued care  Equipment Recommended upon Discharge: Other (comment) (No device needs)  PT Recommended Transfer Status: Stand by assist, Assistive device  PT - OK to Discharge: Yes    PT Visit Info:  PT Received On: 07/14/25     General Visit Information:   General  Reason for Referral: Pt p/w recurrent back pain and radiculopathy, 7/9 s/p revision/extension T10-pelvis fusion, multilevel decompression and TLIFs (EBL: 2.1L)  Past Medical History Relevant to Rehab: HTN, Afib (not on AC), LEATHA, OA, BPH, depression, prior L3-5 fusion ([facility] 10/2022)  Family/Caregiver Present: No  Prior to Session Communication: Bedside nurse  Patient Position " "Received: Bed, 3 rail up, Alarm off, not on at start of session  General Comment: Pt. supine in bed upon arrival.  Pt. is pleasant and willing to participate. Pt. states \"Im leaving today\" Pt. did not report a pain rating.    Subjective   Precautions:  Precautions  Hearing/Visual Limitations: WFL with glasses  Medical Precautions: Fall precautions  Post-Surgical Precautions: Spinal precautions     Date/Time Vitals Session Patient Position Pulse Resp SpO2 BP MAP (mmHg)    07/14/25 1051 --  --  77  --  95 %  --  --           Vital Signs Comment: supine /93, HR 75, Sp 94%, RR 21; VSS during session and at end of session in chair HR 80, SpO2 100% and RR 16     Objective   Pain:  Pain Assessment  Pain Assessment: 0-10  0-10 (Numeric) Pain Score:  (No rating.)  Cognition:  Cognition  Overall Cognitive Status: Within Functional Limits  Following Commands: Follows all commands and directions without difficulty  Coordination:  Movements are Fluid and Coordinated: Yes  Postural Control:  Postural Control  Postural Control: Within Functional Limits  Static Sitting Balance  Static Sitting-Balance Support: Bilateral upper extremity supported, Feet supported  Static Sitting-Level of Assistance:  (Supervision)  Static Standing Balance  Static Standing-Balance Support: Bilateral upper extremity supported  Static Standing-Level of Assistance: Close supervision  Extremity/Trunk Assessments:                      Activity Tolerance:  Activity Tolerance  Endurance: Endurance does not limit participation in activity  Treatments:  Therapeutic Exercise  Therapeutic Exercise Performed: Yes  Therapeutic Exercise Activity 1: Standing  bilat le ex Marches and Heel Raises x 15 reps. Bilat ue/unilateral ue         Bed Mobility  Bed Mobility: Yes  Bed Mobility 1  Bed Mobility 1: Log roll (Cues for flex knees before rolling-)  Level of Assistance 1: Close supervision (/SBA)    Ambulation/Gait Training  Ambulation/Gait Training Performed: " Yes  Ambulation/Gait Training 1  Surface 1:  (Pt. amb. 200' using a wh. walker and sba for cues on proper positioning and to avoid bilat ue overuse.)  Transfers  Transfer: Yes  Transfer 1  Transfer From 1: Sit to, Stand to  Transfer to 1: Sit, Stand  Transfer Level of Assistance 1: Close supervision  Trials/Comments 1: cues for technique  Transfers 2  Transfer From 2: Stand to  Transfer to 2: Sit  Transfer Level of Assistance 2:  (SBA)    Stairs  Stairs: Yes  Stairs  Rails 1:  (Pt. ascended and descended 4 steps with rail on right ascending and left descending- Non reciprocal as instructed. SBA for cues only.)    Outcome Measures:  [facility] Basic Mobility  Turning from your back to your side while in a flat bed without using bedrails: A little  Moving from lying on your back to sitting on the side of a flat bed without using bedrails: A little  Moving to and from bed to chair (including a wheelchair): A little  Standing up from a chair using your arms (e.g. wheelchair or bedside chair): A little  To walk in hospital room: A little  Climbing 3-5 steps with railing: A little  Basic Mobility - Total Score: 18    Education Documentation  Precautions, taught by Radha Azar PTA at 7/14/2025 11:42 AM.  Learner: Patient  Readiness: Acceptance  Method: Explanation, Demonstration  Response: Needs Reinforcement  Comment: Log roll reminders to maintain precautions,. Cues for proper positioning inside the walker.    Mobility Training, taught by Radha Azar PTA at 7/14/2025 11:42 AM.  Learner: Patient  Readiness: Acceptance  Method: Explanation, Demonstration  Response: Needs Reinforcement  Comment: Log roll reminders to maintain precautions,. Cues for proper positioning inside the walker.    Education Comments  No comments found.        OP EDUCATION:       Encounter Problems       Encounter Problems (Active)       PT Problem       Patient will perform bed mobility IND to reduce risk of developing decubitus ulcers.   (Progressing)       Start:  07/10/25    Expected End:  07/24/25            Patient will perform sit to stand and stand to sit transfers MOD-I with LRD to increase functional strength.   (Progressing)       Start:  07/10/25    Expected End:  07/24/25            Patient will ambulate at least 250  ft. MOD-I with LRD to improve tolerance of community distances.     (Progressing)       Start:  07/10/25    Expected End:  07/24/25            Patient will ascend and descend >/= 3 steps with railing and </= close sup to facilitate safe navigation of stairs in the home.    (Met)       Start:  07/10/25    Expected End:  07/24/25    Resolved:  07/13/25         Patient will score >/= 24/28 points on the [employer] Performance Oriented Mobility Assessment to indicate low falls risk. (Progressing)       Start:  07/10/25    Expected End:  07/24/25

## 2025-07-14 NOTE — DISCHARGE SUMMARY
Discharge Diagnosis  Degenerative scoliosis in adult patient    Test Results Pending At Discharge  Pending Labs       No current pending labs.          Hospital Course  Kvng June is a 60 y.o. male with a past medical history of HTN, Afib (not on AC), LEATHA, OA, BPH, depression and prior L3-5 fusion ([facility] 10/2022) who presented with recurrent back pain and radiculopathy. Patient taken to the OR on 7/9 for revision/extension T10-pelvis fusion, multilevel decompression and TLIFs (EBL: 2.1L). He was admitted to the neurosurgery service post operatively.     7/10 Concern for LLE weakness, CT L spine with hardware in position.   7/11 Hbg 7.7 s/p 1 unit PRBC (post transfusion Hbg 8.6).   7/12 Drain auto-dc'd.   7/14 EOS with hardware in position. Prevena removed at bedside.      PT/OT evaluated patient and recommended low level of continued therapy for which patient provided with outpatient prescriptions.      On the day of discharge, the patient was seen and evaluated by the neurosurgery team and deemed suitable for discharge. The patient was given detailed discharge instructions and were scheduled to follow up as an outpatient.    Visit Vitals  /84 (BP Location: Right arm, Patient Position: Lying)   Pulse 79   Temp 37.2 °C (99 °F) (Temporal)   Resp 15     Vitals:    07/13/25 0000   Weight: 102 kg (224 lb 6.9 oz)       Immunization History   Administered Date(s) Administered    COVID-19, mRNA, LNP-S, PF, 30 mcg/0.3 mL dose 02/06/2021, 02/25/2021, 10/11/2021    Flu vaccine (IIV4), preservative free *Check age/dose* 11/17/2014, 12/10/2018, 10/20/2019, 09/20/2020, 10/11/2021, 11/07/2022    Pfizer COVID-19 vaccine, 12 years and older, (30mcg/0.3mL) (Comirnaty) 08/29/2024    Pfizer COVID-19 vaccine, bivalent, age 12 years and older (30 mcg/0.3 mL) 01/26/2023    Tdap vaccine, age 7 year and older (BOOSTRIX, ADACEL) 04/29/2010, 02/28/2024    Zoster vaccine, recombinant, adult (SHINGRIX) 10/29/2020, 12/29/2020      Pertinent Physical Exam At Time of Discharge  Constitutional: A&Ox3, calm and cooperative, NAD.  Eyes: PERRL, EOMI.   ENMT: Moist mucous membranes, no apparent injuries or lesions.  Cardiovascular: Normal rate and regular rhythm. 2+ equal pulses of the distal extremities.  Respiratory/Thorax: CTAB, regular respirations on RA. Good symmetric chest expansion.   Gastrointestinal: Abdomen soft, non tender.   Neurological:   A&Ox3  Face symmetric, Facial SILT   Tongue midline and symmetric  RUE D5 B5 T5 HG/IO 5  RLE HF5 KE5 DF/PF 5  LUE D5 B5 T5 HG/IO 5  LLE HF5 KE5 DF4 PF 5 EHL 4  Numbness on L toes  Psychological: Appropriate mood and behavior.   Skin: Warm and dry. Incision C/D/I.     Home Medications     Medication List      START taking these medications     acetaminophen 325 mg tablet; Commonly known as: Tylenol; Take 2 tablets   (650 mg) by mouth every 6 hours for 7 days.   cyclobenzaprine 10 mg tablet; Commonly known as: Flexeril; Take 1 tablet   (10 mg) by mouth 3 times a day for 7 days.   oxyCODONE 5 mg immediate release tablet; Commonly known as: Roxicodone;   Take 1 tablet (5 mg) by mouth every 6 hours if needed for severe pain (7 -   10) for up to 7 days.   polyethylene glycol 17 gram packet; Commonly known as: Glycolax,   Miralax; Take 17 g by mouth 3 times a day for 7 days.   sennosides-docusate sodium 8.6-50 mg tablet; Commonly known as:   Lakeshia-Colace; Take 2 tablets by mouth 2 times a day for 7 days.     CHANGE how you take these medications     naproxen 500 mg tablet; Commonly known as: Naprosyn; Take 1 tablet (500   mg) by mouth 3 times daily (morning, midday, late afternoon). Do not   restart until discussed further at neurosurgery follow up appointment;   What changed: additional instructions     CONTINUE taking these medications     * buPROPion  mg 24 hr tablet; Commonly known as: Wellbutrin XL;   TAKE 1 TABLET BY MOUTH ONCE DAILY IN THE MORNING. DO NOT CRUSH, CHEW OR   SPLIT TABLETS.   *  buPROPion  mg 24 hr tablet; Commonly known as: Wellbutrin XL;   TAKE 1 TABLET BY MOUTH ONCE IN THE MORNING. DO NOT CRUSH, CHEW OR SPLIT   TABLETS.   chlorthalidone 25 mg tablet; Commonly known as: Hygroton; Take 1 tablet   (25 mg) by mouth once daily.   famciclovir 250 mg tablet; Commonly known as: Famvir; TAKE ONE TABLET BY   MOUTH TWO TIMES A DAY   fexofenadine 180 mg tablet; Commonly known as: Allegra   flaxseed oiL 1,000 mg capsule   flecainide 100 mg tablet; Commonly known as: Tambocor; Take 1 tablet   (100 mg) by mouth 2 times a day.   glucosamine-chondroitin 500-400 mg tablet   losartan 100 mg tablet; Commonly known as: Cozaar; Take 1 tablet (100   mg) by mouth once daily.   metoprolol tartrate 25 mg tablet; Commonly known as: Lopressor; Take 1   tablet (25 mg) by mouth 2 times a day.   multivitamin tablet   pantoprazole 40 mg EC tablet; Commonly known as: ProtoNix; TAKE 1 TABLET   BY MOUTH ONCE DAILY IN THE MORNING BEFORE A MEAL. DO NOT CRUSH, CHEW OR   SPLIT TABLETS.   prednisoLONE acetate 1 % ophthalmic suspension; Commonly known as:   Pred-Forte; Administer 1 drop into the left eye 4 times a day.   tadalafil 5 mg tablet; Commonly known as: Cialis; Take 1 tablet (5 mg)   by mouth once daily as needed for erectile dysfunction.  * This list has 2 medication(s) that are the same as other medications   prescribed for you. Read the directions carefully, and ask your doctor or   other care provider to review them with you.     STOP taking these medications     chlorhexidine 4 % external liquid; Commonly known as: Hibiclens   ipratropium 21 mcg (0.03 %) nasal spray; Commonly known as: Atrovent   turmeric-turmeric root extract 450-50 mg capsule     Outpatient Follow-Up  Future Appointments   Date Time Provider Department Center   7/30/2025 11:00 AM Noel Coronel PA-C [id number] [place]   10/2/2025  8:30 AM Femi Rivera MD PhD [id number] [place]   1/20/2026 11:15 AM Leesa Wasserman MD [id number] [place]    7/9/2026 10:00 AM Jonh Nick MD [id number] [place]       Nayely Farrar PA-C    Total face to face time spent with patient/family of >30 minutes, with >50% of the time spent discussing plan of care/management, counseling/educating on disease processes, explaining results of diagnostic testing.

## 2025-07-14 NOTE — PROGRESS NOTES
Patient is medically cleared for discharge via private transportation with all personal belongings with referrals for outpatient therapy. I will continue to follow until discharged.

## 2025-07-14 NOTE — PROGRESS NOTES
"Kvng June is a 60 y.o. male on day 5 of admission presenting with Degenerative scoliosis in adult patient.    Subjective   NAEO    Objective     Physical Exam  AOx3  RUE D5 B5 T5 HG/IO 5  RLE HF5 KE5 DF/PF 5  LUE D5 B5 T5 HG/IO 5  LLE HF5 KE5 DF4 PF 5 EHL 4  Numbness on L toes  Prevena holding suction    Last Recorded Vitals  Blood pressure (!) 150/94, pulse 81, temperature 35.7 °C (96.3 °F), temperature source Temporal, resp. rate 17, height 1.753 m (5' 9\"), weight 102 kg (224 lb 6.9 oz), SpO2 98%.  Intake/Output last 3 Shifts:  I/O last 3 completed shifts:  In: - (0 mL/kg)   Out: 3830 (37.6 mL/kg) [Urine:3825 (1 mL/kg/hr); Drains:5]  Weight: 101.8 kg     Relevant Results  Lab Results   Component Value Date    WBC 10.9 07/13/2025    HGB 8.0 (L) 07/13/2025    HCT 24.3 (L) 07/13/2025    MCV 98 07/13/2025     07/13/2025       Assessment & Plan  Degenerative scoliosis in adult patient    Epidural lipomatosis    Lumbar radiculopathy    Chronic renal impairment, stage 3a (Multi)    Lumbar disc disease    Osteoarthritis    Pseudoarthrosis of lumbar spine    Degenerative lumbar spinal stenosis    Kvng June is 60 y.o. w/ h/o HTN, Afib (not on AC), LEATHA, OA, BPH, depression, prior L3-5 fusion ([facility] 10/2022), p/w recurrent back pain and radiculopathy, 7/9 s/p revision/extension T10-pelvis fusion, multilevel decompression and TLIFs (EBL: 2.1L), 7/10 hgb 7.4 s/p 1u pRBC, hgb 7.7 s/p 2U pRBC, 7/11 L DF weakness, CT LS hardware in posn, 7/12 drains autodc'd    Tele  Continue prevena  Every day labs  Obtain EOS when able  PTOT OP  DVT ppx, SCDs  Poss dispo in PM           Vic Kulkarni MD    "

## 2025-07-14 NOTE — CARE PLAN
The patient's goals for the shift include      The clinical goals for the shift include Patient will have good pain control      Problem: Pain - Adult  Goal: Verbalizes/displays adequate comfort level or baseline comfort level  Outcome: Met     Problem: Safety - Adult  Goal: Free from fall injury  Outcome: Met     Problem: Discharge Planning  Goal: Discharge to home or other facility with appropriate resources  Outcome: Met     Problem: Chronic Conditions and Co-morbidities  Goal: Patient's chronic conditions and co-morbidity symptoms are monitored and maintained or improved  Outcome: Met     Problem: Nutrition  Goal: Nutrient intake appropriate for maintaining nutritional needs  Outcome: Met     Problem: Skin  Goal: Decreased wound size/increased tissue granulation at next dressing change  Outcome: Met  Goal: Participates in plan/prevention/treatment measures  Outcome: Met  Goal: Prevent/manage excess moisture  Outcome: Met  Goal: Prevent/minimize sheer/friction injuries  Outcome: Met  Goal: Promote/optimize nutrition  Outcome: Met  Goal: Promote skin healing  Outcome: Met     Problem: Fall/Injury  Goal: Not fall by end of shift  Outcome: Met  Goal: Be free from injury by end of the shift  Outcome: Met  Goal: Verbalize understanding of personal risk factors for fall in the hospital  Outcome: Met  Goal: Verbalize understanding of risk factor reduction measures to prevent injury from fall in the home  Outcome: Met  Goal: Use assistive devices by end of the shift  Outcome: Met  Goal: Pace activities to prevent fatigue by end of the shift  Outcome: Met     Problem: Pain  Goal: Takes deep breaths with improved pain control throughout the shift  Outcome: Met  Goal: Turns in bed with improved pain control throughout the shift  Outcome: Met  Goal: Walks with improved pain control throughout the shift  Outcome: Met  Goal: Performs ADL's with improved pain control throughout shift  Outcome: Met  Goal: Participates in PT with  improved pain control throughout the shift  Outcome: Met  Goal: Free from opioid side effects throughout the shift  Outcome: Met  Goal: Free from acute confusion related to pain meds throughout the shift  Outcome: Met

## 2025-07-14 NOTE — CARE PLAN
The patient's goals for the shift include      The clinical goals for the shift include Patient will remain safe and pain controlled throughout the shift.    Pt remained safe and free of injury during night. Pain controlled with oxycodone. No other distress noted. Call light in reach. Resting quietly at this time.     Nadege Alegre RN

## 2025-07-16 NOTE — OP NOTE
T10-pelvis revision/extension fusion with L1-2, L2-3, and L5-S1 laminectomies; L2-3, L4-5, and L5-S1 transforaminal lumbar interbody fusion and bilateral sacroiliac joint fusion Operative Note     Date: 2025  OR Location: [facility] OR    Name: Kvng June, : 1965, Age: 60 y.o., MRN: [id number], Sex: male    Diagnosis  Pre-op Diagnosis      * Degenerative scoliosis in adult patient [M41.50]     * Pseudoarthrosis of lumbar spine [S32.009K]     * Epidural lipomatosis [E88.2]     * Lumbar radiculopathy [M54.16]     * Degenerative lumbar spinal stenosis [M48.061] Post-op Diagnosis     * Degenerative scoliosis in adult patient [M41.50]     * Pseudoarthrosis of lumbar spine [S32.009K]     * Epidural lipomatosis [E88.2]     * Lumbar radiculopathy [M54.16]     * Degenerative lumbar spinal stenosis [M48.061]     Procedures  L1-L2, L2-L3 & L5-S1 laminectomies; L2-L3, L4-L5 & L5-S1 transforaminal interbody fusion; [id number] instrumented posterolateral fusion with S2 alar-iliac fixation; bilateral sacroiliac joint fusion; use of autograft and allograft for fusion; use of spinal stereotactic navigation including robotic assistance    Surgeons      * Femi Rivera - Primary    Resident/Fellow/Other Assistant:  Surgeons and Role:     * Salvador Womack MD - Resident - Assisting     * Vic Kulkarni MD - Resident - Assisting    Staff:   Circulator: Suzi  Scrub Person: Esdras  Circulator: Patricia  Scrub Person: Manny Ernst Circulator: Niharika  Relief Scrub: Leesa  Relief Circulator: Kimberly  Relief Scrub: Emeterio  Relief Circulator: Emeterio    Anesthesia Staff: Anesthesiologist: Renny Fierro MD; Cintia Rmairez MD; Jadon Raymond MD; Ike Flood MD  CRNA: JUAN Julien-CRNA  C-AA: MIKE Meehan  Anesthesia Resident: Luke Rothman MD; Michelle Schulz MD; Sydney Ring, MD    Procedure Summary  Anesthesia: General  ASA: III  Estimated Blood Loss: 2100 mL  Intra-op  Medications:   Administrations occurring from 0815 to 1650 on 07/09/25:   Medication Name Total Dose   lidocaine-epinephrine (Xylocaine W/EPI) 0.5 %-1:200,000 injection 12 mL   polymyxin B injection 500,000 Units   sodium chloride 0.9 % irrigation solution 1,000 mL   albumin human bottle 5% 500 mL   ceFAZolin (Ancef) vial 1 g 4 g   dexAMETHasone (Decadron) 4 mg/mL IV Syringe 2 mL 8 mg   fentaNYL (Sublimaze) injection 50 mcg/mL 100 mcg   ketamine (Ketalar) 500 mg in dextrose 5% 250 mL (2 mg/mL) infusion 130.38 mg   LR bolus Cannot be calculated   lidocaine (Xylocaine) injection 2 % 100 mg   magnesium sulfate 50 % injection 4 g   midazolam PF (Versed) injection 1 mg/mL 2 mg   phenylephrine (Hiram-Synephrine) 10 mg in sodium chloride 0.9% 250 mL (0.04 mg/mL) infusion (premix) 6.76 mg   phenylephrine 40 mcg/mL syringe 10 mL 480 mcg   propofol (Diprivan) injection 10 mg/mL 180 mg   rocuronium (ZeMuron) 50 mg/5 mL injection 210 mg   NaCl 0.9 % bolus Cannot be calculated   tranexamic acid (Cyklokapron) 5,000 mg in sodium chloride 0.9% 250 mL (20 mg/mL) infusion 1,241.29 mg   tranexamic acid 1,000 mg/100 mL NS (premix) 1,876 mg   vancomycin 1 g 1,500 mg         Intraprocedure I/O Totals       None           Specimen: No specimens collected              Drains and/or Catheters:   [REMOVED] Closed/Suction Drain 1 Midline;Left Back (Removed)   Site Description Healing 07/11/25 2100   Dressing Status Other (Comment) 07/11/25 2100   Drainage Appearance Bloody 07/11/25 2100   Status To bulb suction 07/11/25 2100   Output (mL) 45 mL 07/12/25 0000       [REMOVED] Closed/Suction Drain 2 Midline;Right Back (Removed)   Site Description Healing 07/12/25 1200   Dressing Status Other (Comment) 07/12/25 1200   Drainage Appearance Bloody 07/12/25 1200   Status To bulb suction 07/12/25 1200   Output (mL) 5 mL 07/12/25 1200           Implants:  Implants       Type Name Action Serial No.      Graft BONE GRAFT SUBSTITUTE, ACTIFUSE MIS KIT, 7.5ML  - [id number] Implanted      Graft BONE GRAFT SUBSTITUTE, ACTIFUSE, LG STRIP, 90MM X 25MM X 7MM - [id number] Implanted      Graft BONE GRAFT SUBSTITUTE, ACTIFUSE, LG STRIP, 90MM X 25MM X 7MM - [id number] Implanted      Spinal Hardware SCREW SET, SOLERA VOYAGER, 5.5/6.0 - [id number] Implanted       6.5X45 ATS Implanted       UNiD EXP COCR AMANDA 5.5MM 4+LV Implanted       IFUSE BEDROCK GRANITE IMPLANT 10.5M X 90MM Implanted [id number]      IFUSE-TORQ IMPLANT 10.0MM X 60MM Implanted       IFUSE BEDROCK GRANITE IMPLANTS 10.5MM X 70MM Implanted [id number]      IFUSE-TORQ IMPLANT 10.0MM X 60MM Implanted      Graft ALLOGRAFT, RIOS 5.5CC - [id number] - [id number] Implanted [id number]     Spinal Hardware SPACER, CATALYFT PL, 7MM, SHORT - [id number] Implanted      Spinal Hardware SPACER, CATALYFT PL, 7MM, SHORT - [id number] Implanted      Spinal Hardware SPACER, CATALYFT PL, 7MM, SHORT - [id number] Implanted      Screw SET SCREW, 5.5, TI NS BRK OFF - [id number] Implanted       VARIABLE ANGLE CRYSTAL Implanted       CRYSTAL Implanted       VARIABLE ANGLE CRYSTAL Implanted      Graft BONE GRAFT, REFILL, ACTIFUSE MIS KIT, 7.5ML - [id number] Implanted      Spinal Hardware SCREW, SOLERA 5.5/6.0 ATS, 7.5 X 50 - [id number] Implanted      Spinal Hardware SCREW, SOLERA 5.5/6.0 ATS, 7.5 X 55 - [id number] Implanted      Spinal Hardware SCREW, SOLERA 5.5/6.0 ATS, 7.5 X 45 - [id number] Implanted               Findings: good decompression and hardware placement    Indications: Kvng June is an 60 y.o. male with a history of prior L2-S2 posterior decompression and L3-L5 posterolateral fusion who presented to clinic with chronic progressive low back and bilateral lower extremity pain.  His imaging showed a levoscoliosis with the apex at the L2-L3 disc space.  He also had a positive sagittal balance.  There was haloing around the L5 pedicle screws bilaterally, and the right L5 screw was fractured.  There was moderate to severe  central stenosis at L1-L2, moderate central stenosis at L2-L3, and severe central stenosis at L5-S1.  There was also severe foraminal stenosis on the right at L2-L3, on the left at L4-L5, and bilaterally at L5-S1.  Prior treatments included physical therapy.  I discussed various treatment options with the patient, including further conservative therapy as well as different surgical options.  I offered a surgical decompression and fusion.  I discussed the risks of surgery, including infection, blood loss, neurologic injury, spinal fluid leak, and the need for further procedures.  Having acknowledged the risks and benefits, the patient elected to proceed with surgery.    Procedure Details: The patient was brought to the operating room.  After patient identification and procedure confirmation, an endotracheal intubation was performed.  The patient was flipped prone onto a Moses table.  The patient's arms were secured and all pressure points were padded.  The Baozun Commerceor-X spine robot ([employer]) was then fixed to the Moses table.  The field was prepped and draped in the usual sterile fashion.  A time out was performed and perioperative intravenous antibiotics were confirmed.      We then carried out the robotic screw placement.  The [employer] Commerceor-X robot was brought into the field and fixed to the patient via a percutaneous pin into the posterior superior iliac spine.  The C-arm was then brought into the room and used to register a pre-operative lumbar CT to the patient, which had previously been uploaded into the [employer] Commerceor-X Stealth platform ([employer]).  Screw trajectories were then planned and sent to the [employer]oz[employer] Commerceor-X robot.  A midline incision was designed and infiltrated with local anesthetic with epinephrine.  The skin was then incised with a #10 blade, extending from approximately T10-L3.  The subcutaneous tissue was then dissected away from the fascia.  Next, pedicle screws (Medtronic Voyager ATS) were placed bilaterally at  T10-L2 using a paraspinal transfascial approach.  Using the CIBDO robot for guidance, a high-speed drill was used to cannulate each pedicle into the vertebral body.  Pedicle screws were then placed with a powered .  We also used robotic assistance to decorticate the T10-T11, T11-T12 & T12-L1 facet joints bilaterally with a navigated drill.  Allograft (Ricks Actifuse) was then packed over the T10-T11, T11-T12 & T12-L1 facet joints bilaterally in order to facilitate posterolateral fusion at these levels.    We then carried out the remaining screw placement.  The midline incision was extended caudally and a subperiosteal dissection was performed at L1-S2.  There was a clear pseudoarthrosis at L4-L5.  The prior rods were removed.  The prior pedicle screws were then removed, noting that the right L5 screw was fractured at the mid shaft.  The O-arm (Brandnew IO) was brought into the room and an intra-operative CT scan was obtained.  The images were then uploaded into the [employer] (Brandnew [employer]).  We then placed bilateral S2 alar-iliac screws (SI-Bone Treutlen).  The entry points were decorticated with a navigated high-speed drill.  A navigated gearshift probe and tap were used to cannulate the screw trajectories.  A ball-tip probe was used to confirm safe trajectories.  Next, S2 alar-iliac screws were placed with a navigated .  In a similar fashion, we then placed an additional screw across the sacroiliac joints (SI-Bone Torq) for sacroiliac joint fusion and augmentation of the pelvic fixation, using entry points lateral to the S1 foramina.  In a similar fashion, we then placed pedicle screws (Medtronic Voyager ATS) at S1 bilaterally.  New upsized pedicle screws (Medtronic Voyager ATS) were placed at L3 & L4 using the prior trajectories.  We did not replace the L5 pedicle screws because the left L5 pedicle was no longer capable of holding a screw and there was insufficient space for a right-sided screw due  to the presence of the retained screw fragment.  Fluoroscopy was then used to confirm proper screw placement.    We then carried out the decompression and interbody fusion.  Using a high-speed drill, Kerrison rongeurs and curettes, laminectomies were performed at L1-L2, L2-L3 & L5-S1.  We then performed bilateral facetectomies at L2-L3, L4-L5 & L5-S1.  The exiting and traversing nerves were identified and confirmed to be well decompressed at each level.  The traversing right S1 nerve root was identified and gently retracted medially.  Using an #11 blade, an annulotomy was performed on the right at L5-S1.  Next, jerry, rongeurs and Pituitary forceps were used to perform a discectomy at this level.  After completion of the discectomy and end plate preparation for arthrodesis, we packed the disc space with allograft (Ricks Actifuse).  Next, using lateral fluoroscopy, a Medtronic Catalyft expandable titanium interbody cage prepacked with autograft & allograft (Ricks Actifuse) was impacted into the disc space.  After confirming the final depth of the interbody cage, the cage was then expanded while using lateral fluoroscopy.  Additional allograft (Ricks Actifuse) was then post-packed into the interbody cage and disc space.  We then performed additional transforaminal interbody placement from the right at L4-L5 and L2-L3 in a similar fashion.    We then completed the instrumented posterolateral fusion and final deformity correction.  Patient-specific cobalt chrome rods were used (Medtronic UNiD).  The primary rods were captured within the pedicle screws beginning at S1 and working rostrally.  Accessory rods were used to connect the S2 alar-iliac screws to the primary rods via two side-to-side connectors placed at L1-L2 and L4-L5 bilaterally.  The set screws were then final tightened.  Final fluoroscopy was used to confirm that all of the instrumentation was appropriately positioned, and showed good restoration of the  coronal and sagittal alignment.  The surgical field was then copiously irrigated.  A posterolateral fusion was performed by decorticating the transverse processes and remaining facet joints of the instrumented vertebrae at L1-S1 bilaterally.  Local autograft mixed with allograft (Ricks Actifuse) was placed along the posterolateral gutters at L1-S1.    We then turned our attention to the closure.  Excellent hemostasis was achieved.  Vancomycin powder was applied to the wound.  Two subfascial drains were placed.  The muscle and fascial layers were closed with interrupted stitches using 0 Vicryl sutures.  The subcutaneous layer was closed with interrupted stitches using 2-0 Vicryl sutures.  The skin was closed with staples.  The skin was cleaned and a wound vac dressing was applied.    Evidence of Infection: No   Complications:  None; patient tolerated the procedure well.    Disposition: ICU - extubated and stable.  Condition: stable     Additional Details: none    Attending Attestation: I was present and scrubbed for the key portions of the procedure.    Femi Rivera  Phone Number: [phone]

## 2025-07-19 DIAGNOSIS — F41.1 GENERALIZED ANXIETY DISORDER: ICD-10-CM

## 2025-07-19 DIAGNOSIS — R10.13 EPIGASTRIC ABDOMINAL PAIN: ICD-10-CM

## 2025-07-19 DIAGNOSIS — F41.9 ANXIETY DISORDER, UNSPECIFIED TYPE: ICD-10-CM

## 2025-07-21 ENCOUNTER — HOSPITAL ENCOUNTER (OUTPATIENT)
Dept: RADIOLOGY | Facility: CLINIC | Age: 60
Discharge: HOME | End: 2025-07-21
Payer: COMMERCIAL

## 2025-07-21 ENCOUNTER — TELEPHONE (OUTPATIENT)
Dept: NEUROSURGERY | Facility: CLINIC | Age: 60
End: 2025-07-21

## 2025-07-21 DIAGNOSIS — Z98.1 STATUS POST THORACIC SPINAL FUSION: ICD-10-CM

## 2025-07-21 DIAGNOSIS — Z98.1 STATUS POST LUMBAR SPINAL FUSION: ICD-10-CM

## 2025-07-21 DIAGNOSIS — Z98.1 STATUS POST THORACIC SPINAL FUSION: Primary | ICD-10-CM

## 2025-07-21 DIAGNOSIS — G89.18 ACUTE POST-OPERATIVE PAIN: ICD-10-CM

## 2025-07-21 DIAGNOSIS — Z98.1 S/P LUMBAR SPINAL FUSION: ICD-10-CM

## 2025-07-21 PROCEDURE — 72070 X-RAY EXAM THORAC SPINE 2VWS: CPT | Performed by: RADIOLOGY

## 2025-07-21 PROCEDURE — 71101 X-RAY EXAM UNILAT RIBS/CHEST: CPT | Mod: RIGHT SIDE | Performed by: RADIOLOGY

## 2025-07-21 PROCEDURE — 72100 X-RAY EXAM L-S SPINE 2/3 VWS: CPT

## 2025-07-21 PROCEDURE — 71101 X-RAY EXAM UNILAT RIBS/CHEST: CPT | Mod: RT

## 2025-07-21 PROCEDURE — 72100 X-RAY EXAM L-S SPINE 2/3 VWS: CPT | Performed by: RADIOLOGY

## 2025-07-21 PROCEDURE — 72070 X-RAY EXAM THORAC SPINE 2VWS: CPT

## 2025-07-21 RX ORDER — BUPROPION HYDROCHLORIDE 300 MG/1
TABLET ORAL
Qty: 90 TABLET | Refills: 0 | Status: SHIPPED | OUTPATIENT
Start: 2025-07-21

## 2025-07-21 RX ORDER — BUPROPION HYDROCHLORIDE 150 MG/1
TABLET ORAL
Qty: 90 TABLET | Refills: 0 | Status: SHIPPED | OUTPATIENT
Start: 2025-07-21

## 2025-07-21 RX ORDER — PANTOPRAZOLE SODIUM 40 MG/1
TABLET, DELAYED RELEASE ORAL
Qty: 90 TABLET | Refills: 0 | Status: SHIPPED | OUTPATIENT
Start: 2025-07-21

## 2025-07-21 RX ORDER — OXYCODONE HYDROCHLORIDE 5 MG/1
5 TABLET ORAL EVERY 6 HOURS PRN
Qty: 28 TABLET | Refills: 0 | Status: SHIPPED | OUTPATIENT
Start: 2025-07-21 | End: 2025-07-28

## 2025-07-21 NOTE — PROGRESS NOTES
I have personally reviewed the OARRS report for Kvng June. I have considered the risks of abuse, dependence, addiction and  diversion. I believe that it is clinically appropriate for Kvng June  to be prescribed this medication.    Patient also experienced a backwards fall onto low steps landing on his back on the second step as he was losing his balance walking to his bedroom.  With recent spinal fusion we will place order for x-rays for hardware surveillance as well as identifying potential rib fractures as he continues with  right sided rib area pain.      Patient with recent history of major reconstructive surgery necessitating narcotic medications at higher doses during the acute post-operative period.     Will continue with close monitoring and short course refills.     The above clinical summary has been dictated with voice recognition software. It has not been proofread for grammatical errors, typographical mistakes, or other semantic inconsistencies.    Thank you for visiting our office today. It was our pleasure to take part in your healthcare.     Do not hesitate to call with any questions regarding your plan of care after leaving at [phone] M-F 8am-4pm.     To clinicians, thank you very much for this kind referral. It is a privilege to partner with you in the care of your patients. My office would be delighted to assist you with any further consultations or with questions regarding the plan of care outlined. Do not hesitate to call the office or contact me directly.       Sincerely,      HILARIO Edwards, PA-C  Associate Physician Assistant, Neurosurgery  Clinical   [facility] [place] [address]    Phone: [phone]  Fax: [phone]

## 2025-07-23 ENCOUNTER — RESULTS FOLLOW-UP (OUTPATIENT)
Dept: NEUROSURGERY | Facility: HOSPITAL | Age: 60
End: 2025-07-23
Payer: COMMERCIAL

## 2025-07-23 NOTE — TELEPHONE ENCOUNTER
Result Communication    Resulted Orders   XR ribs right 2 views w chest pa or ap    Narrative    Interpreted By:  Adriano Mohamud,   STUDY:  XR RIBS RIGHT 2 VIEWS WITH CHEST PA OR AP;  7/21/2025 2:13 pm      INDICATION:  Signs/Symptoms:pain, hardware surveillance, recent fall.      COMPARISON:  05/27/2025      ACCESSION NUMBER(S):  [id number]      ORDERING CLINICIAN:  ESTRELLA WALLACE      FINDINGS:  Right ribs intact without acute fracture. Heart size within normal  limits. Lungs are clear.        Impression    No right rib fracture identified.      MACRO:  None      Signed by: Adriano Mohamud 7/22/2025 7:24 PM  Dictation workstation:   [id number]       9:55 AM      Results were successfully communicated with the patient and they acknowledged their understanding.    Spoke with patient given results from lumbar, thoracic, and rib x-rays obtained secondary to a fall at home.  No hardware changes, adjacent level bony issues, or rib fractures identified.

## 2025-07-25 DIAGNOSIS — Z98.1 S/P LUMBAR SPINAL FUSION: ICD-10-CM

## 2025-07-25 DIAGNOSIS — G89.18 ACUTE POST-OPERATIVE PAIN: ICD-10-CM

## 2025-07-25 RX ORDER — CYCLOBENZAPRINE HCL 10 MG
10 TABLET ORAL 3 TIMES DAILY
Qty: 42 TABLET | Refills: 0 | Status: SHIPPED | OUTPATIENT
Start: 2025-07-25 | End: 2025-08-08

## 2025-07-25 RX ORDER — CYCLOBENZAPRINE HCL 10 MG
10 TABLET ORAL 3 TIMES DAILY
Qty: 21 TABLET | Refills: 0 | OUTPATIENT
Start: 2025-07-25 | End: 2025-08-01

## 2025-07-30 ENCOUNTER — APPOINTMENT (OUTPATIENT)
Dept: NEUROSURGERY | Facility: CLINIC | Age: 60
End: 2025-07-30
Payer: COMMERCIAL

## 2025-07-30 VITALS
HEIGHT: 69 IN | TEMPERATURE: 97.3 F | WEIGHT: 200 LBS | HEART RATE: 108 BPM | DIASTOLIC BLOOD PRESSURE: 80 MMHG | BODY MASS INDEX: 29.62 KG/M2 | SYSTOLIC BLOOD PRESSURE: 108 MMHG

## 2025-07-30 DIAGNOSIS — G89.18 ACUTE POSTOPERATIVE PAIN: ICD-10-CM

## 2025-07-30 DIAGNOSIS — Z98.1 STATUS POST THORACIC SPINAL FUSION: Primary | ICD-10-CM

## 2025-07-30 DIAGNOSIS — Z98.1 STATUS POST LUMBAR SPINAL FUSION: ICD-10-CM

## 2025-07-30 PROCEDURE — 3008F BODY MASS INDEX DOCD: CPT | Performed by: PHYSICIAN ASSISTANT

## 2025-07-30 PROCEDURE — 3079F DIAST BP 80-89 MM HG: CPT | Performed by: PHYSICIAN ASSISTANT

## 2025-07-30 PROCEDURE — 99024 POSTOP FOLLOW-UP VISIT: CPT | Performed by: PHYSICIAN ASSISTANT

## 2025-07-30 PROCEDURE — 3074F SYST BP LT 130 MM HG: CPT | Performed by: PHYSICIAN ASSISTANT

## 2025-07-30 RX ORDER — SULFAMETHOXAZOLE AND TRIMETHOPRIM 800; 160 MG/1; MG/1
1 TABLET ORAL 2 TIMES DAILY
Qty: 14 TABLET | Refills: 0 | Status: SHIPPED | OUTPATIENT
Start: 2025-07-30 | End: 2025-08-06

## 2025-07-30 RX ORDER — OXYCODONE HYDROCHLORIDE 5 MG/1
5 TABLET ORAL EVERY 6 HOURS PRN
Qty: 28 TABLET | Refills: 0 | Status: SHIPPED | OUTPATIENT
Start: 2025-07-30 | End: 2025-08-06

## 2025-07-30 ASSESSMENT — PAIN SCALES - GENERAL: PAINLEVEL_OUTOF10: 4

## 2025-07-30 NOTE — PROGRESS NOTES
[facility]  Department of Neurological Surgery  Post Operative Patient Visit      History of Present Illness:  Kvng June is a 60 y.o. year old male who presents post T10-pelvis revision instrumented fusion by Dr. Femi Rivera on 2025.  Patient continues to utilize a rollator for assistance as he has significant local back pain continuing.  Utilizes oxycodone daily and refill requested.  After review of OARRS report no red flags identified is sent to pharmacy of choice.  Will also continue on cyclobenzaprine as well.  Inspection of his incision shows well-approximated, mildly erythemic, nonedematous incision with staples in place.  All but 6 staples removed with patient tolerating okay and no complications encountered.  Remainder staples to be in place until follow-up visit next week for fear of dehiscence.  Will send a prescription for Bactrim to pharmacy twice daily for 7 days given surface erythema at superior border of incision.  He has scheduled visit with physical therapy beginning tomorrow.  Will place order for x-rays to be taken just prior to next follow-up visit with Dr. Landeros.         systems reviewed and negative other than what is listed in the history of present illness    Problem List[1]  Medical History[2]  Surgical History[3]  Social History     Tobacco Use    Smoking status: Former     Current packs/day: 0.00     Average packs/day: 0.3 packs/day for 43.0 years (11.1 ttl pk-yrs)     Types: Cigarettes     Start date: 1983     Quit date: 2020     Years since quittin.5    Smokeless tobacco: Never   Substance Use Topics    Alcohol use: Yes     Alcohol/week: 10.0 standard drinks of alcohol     Types: 10 Standard drinks or equivalent per week     family history includes Arthritis (age of onset: 50 - 59) in his mother; Colon cancer in his maternal grandfather; Hypertension in his mother; No Known Problems in his father.  Current  Medications[4]  Allergies[5]      The above clinical summary has been dictated with voice recognition software. It has not been proofread for grammatical errors, typographical mistakes, or other semantic inconsistencies.    Thank you for visiting our office today. It was our pleasure to take part in your healthcare.     Do not hesitate to call with any questions regarding your plan of care after leaving at [phone] M-F 8am-4pm.     To clinicians, thank you very much for this kind referral. It is a privilege to partner with you in the care of your patients. My office would be delighted to assist you with any further consultations or with questions regarding the plan of care outlined. Do not hesitate to call the office or contact me directly.       Sincerely,      HILARIO Edwards, PA-C  Associate Physician Assistant, Neurosurgery  Clinical   [facility] [place] [address]    Phone: [phone]  Fax: [phone]             [1]   Patient Active Problem List  Diagnosis    Anxiety disorder    Cervical radiculitis    Contact dermatitis    Epidural lipomatosis    Erectile dysfunction    Hypertension, essential    Idiopathic cervical spinal stenosis    S/P lumbar spinal fusion    Sleep apnea    Lumbar foraminal stenosis    Chronic left-sided low back pain without sciatica    Atrial fibrillation (Multi)    Bloody diarrhea    Palpitations    Spondylolisthesis of lumbar region    Lumbar radiculopathy    Spinal stenosis of lumbar region with neurogenic claudication    Class 1 obesity with body mass index (BMI) of 30.0 to 30.9 in adult    Epistaxis    Herpes simplex    Hyperhidrosis of feet    Paronychia of finger of left hand    Bilateral hip pain    Abnormal gait    Drusen of both optic discs    Vitreous degeneration of right eye    Degenerative scoliosis in  adult patient    Pseudoarthrosis of lumbar spine    Degenerative lumbar spinal stenosis    Chronic renal impairment, stage 3a (Multi)    Lumbar disc disease    Osteoarthritis   [2]   Past Medical History:  Diagnosis Date    Abnormal gait     ADD (attention deficit disorder)     Making appointments can be difficult due to losing focus according to pt    Anxiety     Arrhythmia     Paroxysmal A-fib, follows with Tracy M Schwab, APRN-CNP    Arthritis     Atrial fibrillation (Multi)     Not since ablation    Cervical radiculitis     Chronic pain disorder     left sided low back pain without sciatica    Colon polyp     CPAP (continuous positive airway pressure) dependence     Depression     Diverticulosis     Drusen of both optic discs     Elevated PSA     Epidural lipomatosis     Fatty liver     GERD (gastroesophageal reflux disease)     controlled    History of diverticulitis 2020    S/p treatment Augmentin    History of palpitations     HL (hearing loss)     Hypertension     Irritable bowel syndrome     Lumbar radiculopathy     MORSE (nonalcoholic steatohepatitis)     Personal history of other diseases of the circulatory system 03/30/2022    History of hypertension    Pulmonary nodules     Scoliosis     degenerative scoliosis    SI (sacroiliac) joint dysfunction     Sleep apnea     On CPAP    Spinal stenosis     idiopathic cervical and lumbar    Vision loss    [3]   Past Surgical History:  Procedure Laterality Date    ABLATION OF DYSRHYTHMIC FOCUS  2019    PVI    APPENDECTOMY      BACK SURGERY N/A     COLONOSCOPY  2020    ESOPHAGOGASTRODUODENOSCOPY      OTHER SURGICAL HISTORY  03/30/2022    Appendectomy    OTHER SURGICAL HISTORY  12/30/2022    Left first carpometacarpal arthroplasty with trapezium excision and first metacarpal suspension.    SPINAL FUSION      Lumbar    US GUIDED NEEDLE LIVER BIOPSY  05/18/2020    US GUIDED NEEDLE LIVER BIOPSY 5/18/2020 [facility]   [4]   Current Outpatient Medications:      buPROPion XL (Wellbutrin XL) 150 mg 24 hr tablet, take 1 tablet by mouth once in the morning. do not crush, chew ,or split tablets., Disp: 90 tablet, Rfl: 0    buPROPion XL (Wellbutrin XL) 300 mg 24 hr tablet, TAKE 1 TABLET BY MOUTH ONCE DAILY IN THE MORNING. DO NOT CRUSH, CHEW OR SPLIT TABLETS., Disp: 90 tablet, Rfl: 0    chlorthalidone (Hygroton) 25 mg tablet, Take 1 tablet (25 mg) by mouth once daily., Disp: 90 tablet, Rfl: 1    cyclobenzaprine (Flexeril) 10 mg tablet, Take 1 tablet (10 mg) by mouth 3 times a day for 14 days., Disp: 42 tablet, Rfl: 0    famciclovir (Famvir) 250 mg tablet, TAKE ONE TABLET BY MOUTH TWO TIMES A DAY, Disp: 180 tablet, Rfl: 0    fexofenadine (Allegra) 180 mg tablet, Take 1 tablet (180 mg) by mouth once daily., Disp: , Rfl:     flaxseed oiL 1,000 mg capsule, Take 1 capsule (1,000 mg) by mouth 2 times a day., Disp: , Rfl:     flecainide (Tambocor) 100 mg tablet, Take 1 tablet (100 mg) by mouth 2 times a day., Disp: 180 tablet, Rfl: 3    glucosamine-chondroitin 500-400 mg tablet, Take 1 tablet by mouth 3 times a day., Disp: , Rfl:     losartan (Cozaar) 100 mg tablet, Take 1 tablet (100 mg) by mouth once daily., Disp: 90 tablet, Rfl: 1    metoprolol tartrate (Lopressor) 25 mg tablet, Take 1 tablet (25 mg) by mouth 2 times a day., Disp: 180 tablet, Rfl: 1    multivitamin tablet, Take 1 tablet by mouth once daily., Disp: , Rfl:     naproxen (Naprosyn) 500 mg tablet, Take 1 tablet (500 mg) by mouth 3 times daily (morning, midday, late afternoon). Do not restart until discussed further at neurosurgery follow up appointment, Disp: , Rfl:     oxyCODONE (Roxicodone) 5 mg immediate release tablet, Take 1 tablet (5 mg) by mouth every 6 hours if needed for moderate pain (4 - 6) or severe pain (7 - 10) for up to 7 days., Disp: 28 tablet, Rfl: 0    pantoprazole (ProtoNix) 40 mg EC tablet, TAKE 1 TABLET BY MOUTH ONCE DAILY IN THE MORNING BEFORE A MEAL. DO NOT CRUSH, CHEW, OR SPLIT TABLETS, Disp: 90  tablet, Rfl: 0    prednisoLONE acetate (Pred-Forte) 1 % ophthalmic suspension, Administer 1 drop into the left eye 4 times a day. (Patient taking differently: Administer 1 drop into the left eye 4 times a day. Uses 1 drop into affected eye as needed for eye irritation / flare up), Disp: 5 mL, Rfl: 0    sulfamethoxazole-trimethoprim (Bactrim DS) 800-160 mg tablet, Take 1 tablet by mouth 2 times a day for 7 days., Disp: 14 tablet, Rfl: 0    tadalafil (Cialis) 5 mg tablet, Take 1 tablet (5 mg) by mouth once daily as needed for erectile dysfunction., Disp: 12 tablet, Rfl: 3  [5]   Allergies  Allergen Reactions    Amlodipine Other and Cough     stomach cramps    Venlafaxine Other     Induces Irregular heart beat.

## 2025-07-31 ENCOUNTER — TELEPHONE (OUTPATIENT)
Dept: CARDIOLOGY | Facility: CLINIC | Age: 60
End: 2025-07-31
Payer: COMMERCIAL

## 2025-07-31 NOTE — TELEPHONE ENCOUNTER
"Rec'd call from KAMARI Yoder outpatient.  States she saw patient for his evaluation following recent back surgery and patient reported \"nearly 20 instances\" of near syncope with position changes. +3 falls.    Spoke with patient- he does not check bp/hr at home.     Asking if patient needs medication adjustments?      Last OV 7/3/25  Hypertension  Well-controlled with current medication regimen. Recent increase in blood pressure likely due to cessation of NSAIDs and associated back pain.  - Continue losartan 100 mg daily  - Continue metoprolol 25 mg twice daily  - Continue chlorothiazide 25 mg daily    Patient is also on Flec 100mg bid        "

## 2025-08-07 ENCOUNTER — OFFICE VISIT (OUTPATIENT)
Facility: CLINIC | Age: 60
End: 2025-08-07
Payer: COMMERCIAL

## 2025-08-07 VITALS
HEIGHT: 69 IN | DIASTOLIC BLOOD PRESSURE: 60 MMHG | WEIGHT: 200 LBS | RESPIRATION RATE: 14 BRPM | SYSTOLIC BLOOD PRESSURE: 90 MMHG | HEART RATE: 91 BPM | TEMPERATURE: 96.7 F | BODY MASS INDEX: 29.62 KG/M2

## 2025-08-07 DIAGNOSIS — G89.18 ACUTE POSTOPERATIVE PAIN: ICD-10-CM

## 2025-08-07 DIAGNOSIS — Z98.1 STATUS POST THORACIC SPINAL FUSION: Primary | ICD-10-CM

## 2025-08-07 DIAGNOSIS — Z98.1 STATUS POST LUMBAR SPINAL FUSION: ICD-10-CM

## 2025-08-07 PROCEDURE — 3008F BODY MASS INDEX DOCD: CPT | Performed by: PHYSICIAN ASSISTANT

## 2025-08-07 PROCEDURE — 3074F SYST BP LT 130 MM HG: CPT | Performed by: PHYSICIAN ASSISTANT

## 2025-08-07 PROCEDURE — 3078F DIAST BP <80 MM HG: CPT | Performed by: PHYSICIAN ASSISTANT

## 2025-08-07 PROCEDURE — 99024 POSTOP FOLLOW-UP VISIT: CPT | Performed by: PHYSICIAN ASSISTANT

## 2025-08-07 PROCEDURE — 99213 OFFICE O/P EST LOW 20 MIN: CPT

## 2025-08-07 RX ORDER — OXYCODONE HYDROCHLORIDE 5 MG/1
5 TABLET ORAL EVERY 6 HOURS PRN
Qty: 28 TABLET | Refills: 0 | Status: SHIPPED | OUTPATIENT
Start: 2025-08-07 | End: 2025-08-14

## 2025-08-07 ASSESSMENT — PATIENT HEALTH QUESTIONNAIRE - PHQ9
1. LITTLE INTEREST OR PLEASURE IN DOING THINGS: SEVERAL DAYS
9. THOUGHTS THAT YOU WOULD BE BETTER OFF DEAD, OR OF HURTING YOURSELF: NOT AT ALL
4. FEELING TIRED OR HAVING LITTLE ENERGY: NOT AT ALL
8. MOVING OR SPEAKING SO SLOWLY THAT OTHER PEOPLE COULD HAVE NOTICED. OR THE OPPOSITE, BEING SO FIGETY OR RESTLESS THAT YOU HAVE BEEN MOVING AROUND A LOT MORE THAN USUAL: MORE THAN HALF THE DAYS
2. FEELING DOWN, DEPRESSED OR HOPELESS: SEVERAL DAYS
5. POOR APPETITE OR OVEREATING: MORE THAN HALF THE DAYS
SUM OF ALL RESPONSES TO PHQ9 QUESTIONS 1 AND 2: 2
3. TROUBLE FALLING OR STAYING ASLEEP OR SLEEPING TOO MUCH: NOT AT ALL
SUM OF ALL RESPONSES TO PHQ QUESTIONS 1-9: 10
6. FEELING BAD ABOUT YOURSELF - OR THAT YOU ARE A FAILURE OR HAVE LET YOURSELF OR YOUR FAMILY DOWN: MORE THAN HALF THE DAYS
7. TROUBLE CONCENTRATING ON THINGS, SUCH AS READING THE NEWSPAPER OR WATCHING TELEVISION: MORE THAN HALF THE DAYS

## 2025-08-07 ASSESSMENT — PAIN SCALES - GENERAL: PAINLEVEL_OUTOF10: 7

## 2025-08-07 NOTE — PROGRESS NOTES
[facility]  Department of Neurological Surgery  Post Operative Patient Visit      History of Present Illness:  Kvng June is a 60 y.o. year old male who presents post thoracolumbar revision fusion. Returns for remainder of staple removal which is completed without complications.     Also reviewed OARRS report with no red flags and refill of oxycodone sent to pharmacy.          systems reviewed and negative other than what is listed in the history of present illness    Problem List[1]  Medical History[2]  Surgical History[3]  Social History     Tobacco Use    Smoking status: Former     Current packs/day: 0.00     Average packs/day: 0.3 packs/day for 43.0 years (11.1 ttl pk-yrs)     Types: Cigarettes     Start date: 1983     Quit date:      Years since quittin.6    Smokeless tobacco: Never   Substance Use Topics    Alcohol use: Yes     Alcohol/week: 10.0 standard drinks of alcohol     Types: 10 Standard drinks or equivalent per week     family history includes Arthritis (age of onset: 50 - 59) in his mother; Colon cancer in his maternal grandfather; Hypertension in his mother; No Known Problems in his father.  Current Medications[4]  Allergies[5]      The above clinical summary has been dictated with voice recognition software. It has not been proofread for grammatical errors, typographical mistakes, or other semantic inconsistencies.    Thank you for visiting our office today. It was our pleasure to take part in your healthcare.     Do not hesitate to call with any questions regarding your plan of care after leaving at [phone] M-F 8am-4pm.     To clinicians, thank you very much for this kind referral. It is a privilege to partner with you in the care of your patients. My office would be delighted to assist you with any further consultations or with questions regarding the plan of care outlined. Do not hesitate to call the office or contact me directly.        Sincerely,      HILARIO Edwards, PA-C  Associate Physician Assistant, Neurosurgery  Clinical   [facility]    [place] [address] [address] [place] [address]    Phone: [phone]  Fax: [phone]             [1]   Patient Active Problem List  Diagnosis    Anxiety disorder    Cervical radiculitis    Contact dermatitis    Epidural lipomatosis    Erectile dysfunction    Hypertension, essential    Idiopathic cervical spinal stenosis    S/P lumbar spinal fusion    Sleep apnea    Lumbar foraminal stenosis    Chronic left-sided low back pain without sciatica    Atrial fibrillation (Multi)    Bloody diarrhea    Palpitations    Spondylolisthesis of lumbar region    Lumbar radiculopathy    Spinal stenosis of lumbar region with neurogenic claudication    Class 1 obesity with body mass index (BMI) of 30.0 to 30.9 in adult    Epistaxis    Herpes simplex    Hyperhidrosis of feet    Paronychia of finger of left hand    Bilateral hip pain    Abnormal gait    Drusen of both optic discs    Vitreous degeneration of right eye    Degenerative scoliosis in adult patient    Pseudoarthrosis of lumbar spine    Degenerative lumbar spinal stenosis    Chronic renal impairment, stage 3a (Multi)    Lumbar disc disease    Osteoarthritis   [2]   Past Medical History:  Diagnosis Date    Abnormal gait     ADD (attention deficit disorder)     Making appointments can be difficult due to losing focus according to pt    Anxiety     Arrhythmia     Paroxysmal A-fib, follows with Tracy M Schwab, APRN-CNP    Arthritis     Atrial fibrillation (Multi)     Not since ablation    Cervical radiculitis     Chronic pain disorder     left sided low back pain without sciatica    Colon polyp     CPAP (continuous positive airway pressure) dependence     Depression     Diverticulosis     Drusen of both optic discs     Elevated PSA      Epidural lipomatosis     Fatty liver     GERD (gastroesophageal reflux disease)     controlled    History of diverticulitis 2020    S/p treatment Augmentin    History of palpitations     HL (hearing loss)     Hypertension     Irritable bowel syndrome     Lumbar radiculopathy     MORSE (nonalcoholic steatohepatitis)     Personal history of other diseases of the circulatory system 03/30/2022    History of hypertension    Pulmonary nodules     Rotator cuff syndrome 1970    Scoliosis     degenerative scoliosis    SI (sacroiliac) joint dysfunction     Sleep apnea     On CPAP    Spinal stenosis     idiopathic cervical and lumbar    Vision loss    [3]   Past Surgical History:  Procedure Laterality Date    ABLATION OF DYSRHYTHMIC FOCUS      PVI    APPENDECTOMY      BACK SURGERY N/A     COLONOSCOPY  2020    ESOPHAGOGASTRODUODENOSCOPY      OTHER SURGICAL HISTORY  03/30/2022    Appendectomy    OTHER SURGICAL HISTORY  12/30/2022    Left first carpometacarpal arthroplasty with trapezium excision and first metacarpal suspension.    SPINAL FUSION      Lumbar    US GUIDED NEEDLE LIVER BIOPSY  05/18/2020    US GUIDED NEEDLE LIVER BIOPSY 5/18/2020 [facility]   [4]   Current Outpatient Medications:     buPROPion XL (Wellbutrin XL) 150 mg 24 hr tablet, take 1 tablet by mouth once in the morning. do not crush, chew ,or split tablets., Disp: 90 tablet, Rfl: 0    buPROPion XL (Wellbutrin XL) 300 mg 24 hr tablet, TAKE 1 TABLET BY MOUTH ONCE DAILY IN THE MORNING. DO NOT CRUSH, CHEW OR SPLIT TABLETS., Disp: 90 tablet, Rfl: 0    cyclobenzaprine (Flexeril) 10 mg tablet, Take 1 tablet (10 mg) by mouth 3 times a day for 14 days., Disp: 42 tablet, Rfl: 0    famciclovir (Famvir) 250 mg tablet, TAKE ONE TABLET BY MOUTH TWO TIMES A DAY, Disp: 180 tablet, Rfl: 0    fexofenadine (Allegra) 180 mg tablet, Take 1 tablet (180 mg) by mouth once daily., Disp: , Rfl:     flaxseed oiL 1,000 mg capsule, Take 1 capsule (1,000 mg) by mouth 2 times a  day., Disp: , Rfl:     flecainide (Tambocor) 100 mg tablet, Take 1 tablet (100 mg) by mouth 2 times a day., Disp: 180 tablet, Rfl: 3    glucosamine-chondroitin 500-400 mg tablet, Take 1 tablet by mouth 3 times a day., Disp: , Rfl:     losartan (Cozaar) 100 mg tablet, Take 1 tablet (100 mg) by mouth once daily., Disp: 90 tablet, Rfl: 1    metoprolol tartrate (Lopressor) 25 mg tablet, Take 1 tablet (25 mg) by mouth 2 times a day., Disp: 180 tablet, Rfl: 1    multivitamin tablet, Take 1 tablet by mouth once daily., Disp: , Rfl:     naproxen (Naprosyn) 500 mg tablet, Take 1 tablet (500 mg) by mouth 3 times daily (morning, midday, late afternoon). Do not restart until discussed further at neurosurgery follow up appointment, Disp: , Rfl:     pantoprazole (ProtoNix) 40 mg EC tablet, TAKE 1 TABLET BY MOUTH ONCE DAILY IN THE MORNING BEFORE A MEAL. DO NOT CRUSH, CHEW, OR SPLIT TABLETS, Disp: 90 tablet, Rfl: 0    prednisoLONE acetate (Pred-Forte) 1 % ophthalmic suspension, Administer 1 drop into the left eye 4 times a day. (Patient taking differently: Administer 1 drop into the left eye 4 times a day. Uses 1 drop into affected eye as needed for eye irritation / flare up), Disp: 5 mL, Rfl: 0    chlorthalidone (Hygroton) 25 mg tablet, Take 1 tablet (25 mg) by mouth once daily. (Patient not taking: Reported on 8/7/2025), Disp: 90 tablet, Rfl: 1    oxyCODONE (Roxicodone) 5 mg immediate release tablet, Take 1 tablet (5 mg) by mouth every 6 hours if needed for moderate pain (4 - 6) or severe pain (7 - 10) for up to 7 days., Disp: 28 tablet, Rfl: 0    tadalafil (Cialis) 5 mg tablet, Take 1 tablet (5 mg) by mouth once daily as needed for erectile dysfunction., Disp: 12 tablet, Rfl: 3  [5]   Allergies  Allergen Reactions    Amlodipine Other and Cough     stomach cramps    Venlafaxine Other     Induces Irregular heart beat.

## 2025-08-08 DIAGNOSIS — Z98.1 STATUS POST LUMBAR SPINAL FUSION: ICD-10-CM

## 2025-08-08 DIAGNOSIS — G89.18 ACUTE POSTOPERATIVE PAIN: ICD-10-CM

## 2025-08-08 DIAGNOSIS — Z98.1 STATUS POST THORACIC SPINAL FUSION: ICD-10-CM

## 2025-08-08 RX ORDER — SULFAMETHOXAZOLE AND TRIMETHOPRIM 800; 160 MG/1; MG/1
1 TABLET ORAL 2 TIMES DAILY
Qty: 14 TABLET | Refills: 0 | Status: SHIPPED | OUTPATIENT
Start: 2025-08-08 | End: 2025-08-15

## 2025-08-15 DIAGNOSIS — Z98.1 STATUS POST THORACIC SPINAL FUSION: ICD-10-CM

## 2025-08-15 DIAGNOSIS — Z98.1 STATUS POST LUMBAR SPINAL FUSION: ICD-10-CM

## 2025-08-15 DIAGNOSIS — G89.18 ACUTE POSTOPERATIVE PAIN: ICD-10-CM

## 2025-08-15 RX ORDER — OXYCODONE HYDROCHLORIDE 5 MG/1
5 TABLET ORAL EVERY 6 HOURS PRN
Qty: 28 TABLET | Refills: 0 | Status: SHIPPED | OUTPATIENT
Start: 2025-08-15 | End: 2025-08-22

## 2025-08-26 DIAGNOSIS — T81.30XA WOUND DEHISCENCE: Primary | ICD-10-CM

## 2025-08-26 DIAGNOSIS — R41.3 MEMORY LOSS: ICD-10-CM

## 2025-10-09 ENCOUNTER — APPOINTMENT (OUTPATIENT)
Facility: CLINIC | Age: 60
End: 2025-10-09
Payer: COMMERCIAL

## 2026-01-20 ENCOUNTER — APPOINTMENT (OUTPATIENT)
Dept: DERMATOLOGY | Facility: CLINIC | Age: 61
End: 2026-01-20
Payer: COMMERCIAL

## 2026-02-03 ENCOUNTER — APPOINTMENT (OUTPATIENT)
Dept: CARDIOLOGY | Facility: CLINIC | Age: 61
End: 2026-02-03
Payer: COMMERCIAL

## 2026-07-09 ENCOUNTER — APPOINTMENT (OUTPATIENT)
Dept: CARDIOLOGY | Facility: CLINIC | Age: 61
End: 2026-07-09
Payer: COMMERCIAL

## (undated) DEVICE — Device

## (undated) DEVICE — SEALANT, HEMOSTATIC, FLOSEAL, 10 ML

## (undated) DEVICE — PATIENT SPECIFIC UNID TECHNOLOGY

## (undated) DEVICE — MANIFOLD, 4 PORT NEPTUNE STANDARD

## (undated) DEVICE — BONE, MILL, MIDAS REX, DUAL BLADE, ELECTRIC

## (undated) DEVICE — DRESSING, PREVENA PLUS, CUSTOMIZABLE, 90CM

## (undated) DEVICE — CAUTERY, PENCIL, PUSH BUTTON, SMOKE EVAC, 70MM

## (undated) DEVICE — SPONGE, HEMOSTAT, SURGICEL FIBRILLAR, ABS, 4 X 4, LF

## (undated) DEVICE — TUBING, SUCTION, CONNECTING, STERILE 0.25 X 120 IN., LF

## (undated) DEVICE — EXTENDER, SV TAB, 5.5/6.0

## (undated) DEVICE — ELECTRODE, ELECTROSURGICAL, BLADE EXT 4 INCH, INSULATED

## (undated) DEVICE — APPLICATOR, CHLORAPREP, W/ORANGE TINT, 26ML

## (undated) DEVICE — DRESSING, MEPILEX, POST OP, 8 X 4

## (undated) DEVICE — HEMOSTAT, SURGICEL POWDER 3.0GRAMS

## (undated) DEVICE — NEEDLE, ARTERIAL, 18G X 2.75 IN, AMC/4

## (undated) DEVICE — SUTURE, VICRYL, 0, 18 IN, UNDYED

## (undated) DEVICE — EVACUATOR, WOUND, CLOSED, 3 SPRING, 400 CC, Y CONNECTING TUBE

## (undated) DEVICE — TAPE, SILK, DURAPORE, 3 IN X 10 YD, LF

## (undated) DEVICE — KIT, PATIENT CARE, JACKSON TABLE W/PRONE-SAFE HEADREST

## (undated) DEVICE — DISSECTING TOOL, 31CM, MAZOR X STEALTH, MIDAS REX

## (undated) DEVICE — SUTURE, VICRYL, 4-0, 18 IN, UNDYED BR PS-2

## (undated) DEVICE — DRAPE, SHEET, FAN FOLDED, HALF, 44 X 58 IN, DISPOSABLE, LF, STERILE

## (undated) DEVICE — COVER, CART, 45 X 27 X 48 IN, CLEAR

## (undated) DEVICE — WOUND SYSTEM, DEBRIDEMENT & CLEANING, O.R DUOPAK

## (undated) DEVICE — DRESSING, MEPILEX, BORDER FLEX, 3 X 3

## (undated) DEVICE — THERAPY UNIT, PREVENA PLUS 125

## (undated) DEVICE — TUBING, SMOKE EVAC, 3/8 X 10 FT

## (undated) DEVICE — PASSIVE SPHERES

## (undated) DEVICE — ELECTRODE, ELECTROSURGICAL, BLADE, INSULATED, ENT/IMA, STERILE

## (undated) DEVICE — MARKER, SKIN, RULER AND LABEL PACK, CUSTOM

## (undated) DEVICE — DRESSING, MEPILEX, BORDER FLEX, 6 X 8

## (undated) DEVICE — TOOL, MR8 14CM MATCH 3MM

## (undated) DEVICE — DRAIN, WOUND, ROUND, W/TROCAR, HOLE PATTERN, 10 IN, MEDIUM, 1/8 X 49 IN

## (undated) DEVICE — BLADE, MILL, MEDIUM

## (undated) DEVICE — DRAPE, C-ARMOR KIT

## (undated) DEVICE — TIP,  ELECTRODE COATED INSULATED, EXTENDED, LF

## (undated) DEVICE — PAD, GROUNDING, ELECTROSURGICAL, W/9 FT CABLE, POLYHESIVE II, ADULT, LF

## (undated) DEVICE — DRAPE, C-ARM IMAGE